# Patient Record
Sex: FEMALE | Race: BLACK OR AFRICAN AMERICAN | NOT HISPANIC OR LATINO | URBAN - METROPOLITAN AREA
[De-identification: names, ages, dates, MRNs, and addresses within clinical notes are randomized per-mention and may not be internally consistent; named-entity substitution may affect disease eponyms.]

---

## 2018-09-21 ENCOUNTER — INPATIENT (INPATIENT)
Facility: HOSPITAL | Age: 43
LOS: 4 days | Discharge: HOME | DRG: 292 | End: 2018-09-26
Attending: FAMILY MEDICINE | Admitting: FAMILY MEDICINE
Payer: MEDICAID

## 2018-09-21 VITALS — WEIGHT: 240.08 LBS | HEIGHT: 68 IN | HEART RATE: 115 BPM | RESPIRATION RATE: 18 BRPM | OXYGEN SATURATION: 100 %

## 2018-09-21 LAB
ALBUMIN SERPL ELPH-MCNC: 3.6 G/DL — SIGNIFICANT CHANGE UP (ref 3.5–5)
ALP SERPL-CCNC: 81 U/L — SIGNIFICANT CHANGE UP (ref 40–120)
ALT FLD-CCNC: 42 U/L DA — SIGNIFICANT CHANGE UP (ref 10–60)
ANION GAP SERPL CALC-SCNC: 10 MMOL/L — SIGNIFICANT CHANGE UP (ref 5–17)
AST SERPL-CCNC: 30 U/L — SIGNIFICANT CHANGE UP (ref 10–40)
BASOPHILS # BLD AUTO: 0.1 K/UL — SIGNIFICANT CHANGE UP (ref 0–0.2)
BASOPHILS NFR BLD AUTO: 1.2 % — SIGNIFICANT CHANGE UP (ref 0–2)
BILIRUB SERPL-MCNC: 1.4 MG/DL — HIGH (ref 0.2–1.2)
BUN SERPL-MCNC: 18 MG/DL — SIGNIFICANT CHANGE UP (ref 7–18)
CALCIUM SERPL-MCNC: 8.9 MG/DL — SIGNIFICANT CHANGE UP (ref 8.4–10.5)
CHLORIDE SERPL-SCNC: 106 MMOL/L — SIGNIFICANT CHANGE UP (ref 96–108)
CO2 SERPL-SCNC: 23 MMOL/L — SIGNIFICANT CHANGE UP (ref 22–31)
CREAT SERPL-MCNC: 1.23 MG/DL — SIGNIFICANT CHANGE UP (ref 0.5–1.3)
D DIMER BLD IA.RAPID-MCNC: 912 NG/ML DDU — HIGH
EOSINOPHIL # BLD AUTO: 0.1 K/UL — SIGNIFICANT CHANGE UP (ref 0–0.5)
EOSINOPHIL NFR BLD AUTO: 1.4 % — SIGNIFICANT CHANGE UP (ref 0–6)
GLUCOSE SERPL-MCNC: 137 MG/DL — HIGH (ref 70–99)
HCG SERPL-ACNC: <1 MIU/ML — SIGNIFICANT CHANGE UP
HCT VFR BLD CALC: 37.7 % — SIGNIFICANT CHANGE UP (ref 34.5–45)
HGB BLD-MCNC: 11.9 G/DL — SIGNIFICANT CHANGE UP (ref 11.5–15.5)
LYMPHOCYTES # BLD AUTO: 1.7 K/UL — SIGNIFICANT CHANGE UP (ref 1–3.3)
LYMPHOCYTES # BLD AUTO: 25.6 % — SIGNIFICANT CHANGE UP (ref 13–44)
MCHC RBC-ENTMCNC: 31.4 GM/DL — LOW (ref 32–36)
MCHC RBC-ENTMCNC: 32.7 PG — SIGNIFICANT CHANGE UP (ref 27–34)
MCV RBC AUTO: 103.9 FL — HIGH (ref 80–100)
MONOCYTES # BLD AUTO: 0.3 K/UL — SIGNIFICANT CHANGE UP (ref 0–0.9)
MONOCYTES NFR BLD AUTO: 4.5 % — SIGNIFICANT CHANGE UP (ref 2–14)
NEUTROPHILS # BLD AUTO: 4.6 K/UL — SIGNIFICANT CHANGE UP (ref 1.8–7.4)
NEUTROPHILS NFR BLD AUTO: 67.3 % — SIGNIFICANT CHANGE UP (ref 43–77)
PLATELET # BLD AUTO: 350 K/UL — SIGNIFICANT CHANGE UP (ref 150–400)
POTASSIUM SERPL-MCNC: 3.8 MMOL/L — SIGNIFICANT CHANGE UP (ref 3.5–5.3)
POTASSIUM SERPL-SCNC: 3.8 MMOL/L — SIGNIFICANT CHANGE UP (ref 3.5–5.3)
PROT SERPL-MCNC: 7.5 G/DL — SIGNIFICANT CHANGE UP (ref 6–8.3)
RBC # BLD: 3.63 M/UL — LOW (ref 3.8–5.2)
RBC # FLD: 14.9 % — HIGH (ref 10.3–14.5)
SODIUM SERPL-SCNC: 139 MMOL/L — SIGNIFICANT CHANGE UP (ref 135–145)
TROPONIN I SERPL-MCNC: 0.06 NG/ML — HIGH (ref 0–0.04)
WBC # BLD: 6.8 K/UL — SIGNIFICANT CHANGE UP (ref 3.8–10.5)
WBC # FLD AUTO: 6.8 K/UL — SIGNIFICANT CHANGE UP (ref 3.8–10.5)

## 2018-09-21 PROCEDURE — 71275 CT ANGIOGRAPHY CHEST: CPT | Mod: 26

## 2018-09-21 PROCEDURE — 71045 X-RAY EXAM CHEST 1 VIEW: CPT | Mod: 26

## 2018-09-21 RX ORDER — NITROGLYCERIN 6.5 MG
0.4 CAPSULE, EXTENDED RELEASE ORAL ONCE
Qty: 0 | Refills: 0 | Status: COMPLETED | OUTPATIENT
Start: 2018-09-21 | End: 2018-09-21

## 2018-09-21 RX ORDER — NITROGLYCERIN 6.5 MG
1 CAPSULE, EXTENDED RELEASE ORAL DAILY
Qty: 0 | Refills: 0 | Status: DISCONTINUED | OUTPATIENT
Start: 2018-09-21 | End: 2018-09-22

## 2018-09-21 RX ORDER — FUROSEMIDE 40 MG
80 TABLET ORAL ONCE
Qty: 0 | Refills: 0 | Status: COMPLETED | OUTPATIENT
Start: 2018-09-21 | End: 2018-09-21

## 2018-09-21 RX ADMIN — Medication 80 MILLIGRAM(S): at 23:19

## 2018-09-21 RX ADMIN — Medication 0.4 MILLIGRAM(S): at 21:56

## 2018-09-21 NOTE — ED PROVIDER NOTE - CARE PLAN
Principal Discharge DX:	Acute congestive heart failure, unspecified heart failure type  Secondary Diagnosis:	Hypertension, unspecified type

## 2018-09-21 NOTE — ED ADULT NURSE NOTE - NSIMPLEMENTINTERV_GEN_ALL_ED
Implemented All Universal Safety Interventions:  [place] to call system. Call bell, personal items and telephone within reach. Instruct patient to call for assistance. Room bathroom lighting operational. Non-slip footwear when patient is off stretcher. Physically safe environment: no spills, clutter or unnecessary equipment. Stretcher in lowest position, wheels locked, appropriate side rails in place.

## 2018-09-21 NOTE — ED PROVIDER NOTE - MEDICAL DECISION MAKING DETAILS
No known PE risk factors but given mild tachycardia and respiratory distress, will obtain dimer. Presentation concerning for new onset CHF. R/o ACS, may be secondary to right heart strain from PE. HCG, CDC, CMP, stroke, BNP, EKG, monitor, Nitroglycerin, CXR, likely CTA and admit. Pt feels greatly improved on 3liter nasal cannula with improvement in work of breathing.

## 2018-09-21 NOTE — ED ADULT NURSE NOTE - OBJECTIVE STATEMENT
pt is aox3 came to ER via EMS with c/o SOB. and high blood pressure. pt said she is not on any BP medication. EKG was done. To be seen by Md

## 2018-09-22 DIAGNOSIS — I50.9 HEART FAILURE, UNSPECIFIED: ICD-10-CM

## 2018-09-22 DIAGNOSIS — Z29.9 ENCOUNTER FOR PROPHYLACTIC MEASURES, UNSPECIFIED: ICD-10-CM

## 2018-09-22 DIAGNOSIS — I16.1 HYPERTENSIVE EMERGENCY: ICD-10-CM

## 2018-09-22 DIAGNOSIS — J45.909 UNSPECIFIED ASTHMA, UNCOMPLICATED: ICD-10-CM

## 2018-09-22 LAB
ANION GAP SERPL CALC-SCNC: 11 MMOL/L — SIGNIFICANT CHANGE UP (ref 5–17)
ANION GAP SERPL CALC-SCNC: 8 MMOL/L — SIGNIFICANT CHANGE UP (ref 5–17)
ANION GAP SERPL CALC-SCNC: 9 MMOL/L — SIGNIFICANT CHANGE UP (ref 5–17)
BASOPHILS # BLD AUTO: 0.1 K/UL — SIGNIFICANT CHANGE UP (ref 0–0.2)
BASOPHILS NFR BLD AUTO: 0.8 % — SIGNIFICANT CHANGE UP (ref 0–2)
BUN SERPL-MCNC: 14 MG/DL — SIGNIFICANT CHANGE UP (ref 7–18)
BUN SERPL-MCNC: 15 MG/DL — SIGNIFICANT CHANGE UP (ref 7–18)
BUN SERPL-MCNC: 19 MG/DL — HIGH (ref 7–18)
CALCIUM SERPL-MCNC: 8.8 MG/DL — SIGNIFICANT CHANGE UP (ref 8.4–10.5)
CALCIUM SERPL-MCNC: 8.9 MG/DL — SIGNIFICANT CHANGE UP (ref 8.4–10.5)
CALCIUM SERPL-MCNC: 9.3 MG/DL — SIGNIFICANT CHANGE UP (ref 8.4–10.5)
CHLORIDE SERPL-SCNC: 100 MMOL/L — SIGNIFICANT CHANGE UP (ref 96–108)
CHLORIDE SERPL-SCNC: 101 MMOL/L — SIGNIFICANT CHANGE UP (ref 96–108)
CHLORIDE SERPL-SCNC: 102 MMOL/L — SIGNIFICANT CHANGE UP (ref 96–108)
CHOLEST SERPL-MCNC: 214 MG/DL — HIGH (ref 10–199)
CK MB CFR SERPL CALC: <1 NG/ML — SIGNIFICANT CHANGE UP (ref 0–3.6)
CK SERPL-CCNC: 251 U/L — HIGH (ref 21–215)
CO2 SERPL-SCNC: 27 MMOL/L — SIGNIFICANT CHANGE UP (ref 22–31)
CO2 SERPL-SCNC: 27 MMOL/L — SIGNIFICANT CHANGE UP (ref 22–31)
CO2 SERPL-SCNC: 28 MMOL/L — SIGNIFICANT CHANGE UP (ref 22–31)
CREAT SERPL-MCNC: 1.08 MG/DL — SIGNIFICANT CHANGE UP (ref 0.5–1.3)
CREAT SERPL-MCNC: 1.09 MG/DL — SIGNIFICANT CHANGE UP (ref 0.5–1.3)
CREAT SERPL-MCNC: 1.17 MG/DL — SIGNIFICANT CHANGE UP (ref 0.5–1.3)
EOSINOPHIL # BLD AUTO: 0 K/UL — SIGNIFICANT CHANGE UP (ref 0–0.5)
EOSINOPHIL NFR BLD AUTO: 0.1 % — SIGNIFICANT CHANGE UP (ref 0–6)
GLUCOSE SERPL-MCNC: 114 MG/DL — HIGH (ref 70–99)
GLUCOSE SERPL-MCNC: 116 MG/DL — HIGH (ref 70–99)
GLUCOSE SERPL-MCNC: 90 MG/DL — SIGNIFICANT CHANGE UP (ref 70–99)
HBA1C BLD-MCNC: 4.9 % — SIGNIFICANT CHANGE UP (ref 4–5.6)
HCT VFR BLD CALC: 40.4 % — SIGNIFICANT CHANGE UP (ref 34.5–45)
HDLC SERPL-MCNC: 47 MG/DL — LOW
HGB BLD-MCNC: 13 G/DL — SIGNIFICANT CHANGE UP (ref 11.5–15.5)
INR BLD: 1.22 RATIO — HIGH (ref 0.88–1.16)
LIPID PNL WITH DIRECT LDL SERPL: 146 MG/DL — SIGNIFICANT CHANGE UP
LYMPHOCYTES # BLD AUTO: 1.2 K/UL — SIGNIFICANT CHANGE UP (ref 1–3.3)
LYMPHOCYTES # BLD AUTO: 10.5 % — LOW (ref 13–44)
MCHC RBC-ENTMCNC: 32.3 GM/DL — SIGNIFICANT CHANGE UP (ref 32–36)
MCHC RBC-ENTMCNC: 33.6 PG — SIGNIFICANT CHANGE UP (ref 27–34)
MCV RBC AUTO: 104 FL — HIGH (ref 80–100)
MONOCYTES # BLD AUTO: 0.4 K/UL — SIGNIFICANT CHANGE UP (ref 0–0.9)
MONOCYTES NFR BLD AUTO: 3.6 % — SIGNIFICANT CHANGE UP (ref 2–14)
NEUTROPHILS # BLD AUTO: 9.5 K/UL — HIGH (ref 1.8–7.4)
NEUTROPHILS NFR BLD AUTO: 85 % — HIGH (ref 43–77)
PLATELET # BLD AUTO: 347 K/UL — SIGNIFICANT CHANGE UP (ref 150–400)
POTASSIUM SERPL-MCNC: 2.9 MMOL/L — CRITICAL LOW (ref 3.5–5.3)
POTASSIUM SERPL-MCNC: 3.1 MMOL/L — LOW (ref 3.5–5.3)
POTASSIUM SERPL-MCNC: 6.3 MMOL/L — CRITICAL HIGH (ref 3.5–5.3)
POTASSIUM SERPL-SCNC: 2.9 MMOL/L — CRITICAL LOW (ref 3.5–5.3)
POTASSIUM SERPL-SCNC: 3.1 MMOL/L — LOW (ref 3.5–5.3)
POTASSIUM SERPL-SCNC: 6.3 MMOL/L — CRITICAL HIGH (ref 3.5–5.3)
PROTHROM AB SERPL-ACNC: 13.3 SEC — HIGH (ref 9.8–12.7)
RBC # BLD: 3.88 M/UL — SIGNIFICANT CHANGE UP (ref 3.8–5.2)
RBC # FLD: 14.8 % — HIGH (ref 10.3–14.5)
SODIUM SERPL-SCNC: 136 MMOL/L — SIGNIFICANT CHANGE UP (ref 135–145)
SODIUM SERPL-SCNC: 138 MMOL/L — SIGNIFICANT CHANGE UP (ref 135–145)
SODIUM SERPL-SCNC: 139 MMOL/L — SIGNIFICANT CHANGE UP (ref 135–145)
TOTAL CHOLESTEROL/HDL RATIO MEASUREMENT: 4.6 RATIO — SIGNIFICANT CHANGE UP (ref 3.3–7.1)
TRIGL SERPL-MCNC: 104 MG/DL — SIGNIFICANT CHANGE UP (ref 10–149)
TROPONIN I SERPL-MCNC: 0.03 NG/ML — SIGNIFICANT CHANGE UP (ref 0–0.04)
TROPONIN I SERPL-MCNC: 0.05 NG/ML — HIGH (ref 0–0.04)
TSH SERPL-MCNC: 2.28 UU/ML — SIGNIFICANT CHANGE UP (ref 0.34–4.82)
WBC # BLD: 11.1 K/UL — HIGH (ref 3.8–10.5)
WBC # FLD AUTO: 11.1 K/UL — HIGH (ref 3.8–10.5)

## 2018-09-22 PROCEDURE — 99285 EMERGENCY DEPT VISIT HI MDM: CPT

## 2018-09-22 RX ORDER — NITROGLYCERIN 6.5 MG
5 CAPSULE, EXTENDED RELEASE ORAL
Qty: 50 | Refills: 0 | Status: DISCONTINUED | OUTPATIENT
Start: 2018-09-22 | End: 2018-09-22

## 2018-09-22 RX ORDER — NITROGLYCERIN 6.5 MG
1 CAPSULE, EXTENDED RELEASE ORAL ONCE
Qty: 0 | Refills: 0 | Status: COMPLETED | OUTPATIENT
Start: 2018-09-22 | End: 2018-09-22

## 2018-09-22 RX ORDER — ASPIRIN/CALCIUM CARB/MAGNESIUM 324 MG
325 TABLET ORAL ONCE
Qty: 0 | Refills: 0 | Status: COMPLETED | OUTPATIENT
Start: 2018-09-22 | End: 2018-09-22

## 2018-09-22 RX ORDER — POTASSIUM CHLORIDE 20 MEQ
20 PACKET (EA) ORAL
Qty: 0 | Refills: 0 | Status: COMPLETED | OUTPATIENT
Start: 2018-09-22 | End: 2018-09-22

## 2018-09-22 RX ORDER — ASPIRIN/CALCIUM CARB/MAGNESIUM 324 MG
81 TABLET ORAL DAILY
Qty: 0 | Refills: 0 | Status: DISCONTINUED | OUTPATIENT
Start: 2018-09-22 | End: 2018-09-26

## 2018-09-22 RX ORDER — METOPROLOL TARTRATE 50 MG
25 TABLET ORAL
Qty: 0 | Refills: 0 | Status: DISCONTINUED | OUTPATIENT
Start: 2018-09-22 | End: 2018-09-26

## 2018-09-22 RX ORDER — POTASSIUM CHLORIDE 20 MEQ
20 PACKET (EA) ORAL
Qty: 0 | Refills: 0 | Status: COMPLETED | OUTPATIENT
Start: 2018-09-22 | End: 2018-09-23

## 2018-09-22 RX ORDER — NITROGLYCERIN 6.5 MG
0.5 CAPSULE, EXTENDED RELEASE ORAL
Qty: 50 | Refills: 0 | Status: DISCONTINUED | OUTPATIENT
Start: 2018-09-22 | End: 2018-09-22

## 2018-09-22 RX ORDER — HYDRALAZINE HCL 50 MG
10 TABLET ORAL EVERY 6 HOURS
Qty: 0 | Refills: 0 | Status: DISCONTINUED | OUTPATIENT
Start: 2018-09-22 | End: 2018-09-24

## 2018-09-22 RX ORDER — HYDRALAZINE HCL 50 MG
10 TABLET ORAL EVERY 4 HOURS
Qty: 0 | Refills: 0 | Status: DISCONTINUED | OUTPATIENT
Start: 2018-09-22 | End: 2018-09-22

## 2018-09-22 RX ORDER — FUROSEMIDE 40 MG
60 TABLET ORAL EVERY 12 HOURS
Qty: 0 | Refills: 0 | Status: DISCONTINUED | OUTPATIENT
Start: 2018-09-22 | End: 2018-09-24

## 2018-09-22 RX ORDER — ATORVASTATIN CALCIUM 80 MG/1
40 TABLET, FILM COATED ORAL AT BEDTIME
Qty: 0 | Refills: 0 | Status: DISCONTINUED | OUTPATIENT
Start: 2018-09-22 | End: 2018-09-26

## 2018-09-22 RX ORDER — HEPARIN SODIUM 5000 [USP'U]/ML
INJECTION INTRAVENOUS; SUBCUTANEOUS
Qty: 25000 | Refills: 0 | Status: DISCONTINUED | OUTPATIENT
Start: 2018-09-22 | End: 2018-09-22

## 2018-09-22 RX ORDER — HYDRALAZINE HCL 50 MG
10 TABLET ORAL ONCE
Qty: 0 | Refills: 0 | Status: COMPLETED | OUTPATIENT
Start: 2018-09-22 | End: 2018-09-22

## 2018-09-22 RX ORDER — HYDRALAZINE HCL 50 MG
10 TABLET ORAL EVERY 6 HOURS
Qty: 0 | Refills: 0 | Status: DISCONTINUED | OUTPATIENT
Start: 2018-09-22 | End: 2018-09-22

## 2018-09-22 RX ORDER — LISINOPRIL 2.5 MG/1
5 TABLET ORAL DAILY
Qty: 0 | Refills: 0 | Status: DISCONTINUED | OUTPATIENT
Start: 2018-09-22 | End: 2018-09-24

## 2018-09-22 RX ORDER — HYDRALAZINE HCL 50 MG
10 TABLET ORAL THREE TIMES A DAY
Qty: 0 | Refills: 0 | Status: DISCONTINUED | OUTPATIENT
Start: 2018-09-22 | End: 2018-09-22

## 2018-09-22 RX ORDER — ALBUTEROL 90 UG/1
2 AEROSOL, METERED ORAL EVERY 6 HOURS
Qty: 0 | Refills: 0 | Status: DISCONTINUED | OUTPATIENT
Start: 2018-09-22 | End: 2018-09-26

## 2018-09-22 RX ADMIN — Medication 1 INCH(S): at 12:10

## 2018-09-22 RX ADMIN — Medication 20 MILLIEQUIVALENT(S): at 11:07

## 2018-09-22 RX ADMIN — Medication 1 INCH(S): at 01:07

## 2018-09-22 RX ADMIN — ATORVASTATIN CALCIUM 40 MILLIGRAM(S): 80 TABLET, FILM COATED ORAL at 22:32

## 2018-09-22 RX ADMIN — Medication 60 MILLIGRAM(S): at 05:43

## 2018-09-22 RX ADMIN — HEPARIN SODIUM 1000 UNIT(S)/HR: 5000 INJECTION INTRAVENOUS; SUBCUTANEOUS at 01:41

## 2018-09-22 RX ADMIN — Medication 60 MILLIGRAM(S): at 19:00

## 2018-09-22 RX ADMIN — Medication 10 MILLIGRAM(S): at 01:43

## 2018-09-22 RX ADMIN — Medication 325 MILLIGRAM(S): at 01:43

## 2018-09-22 RX ADMIN — Medication 20 MILLIEQUIVALENT(S): at 14:07

## 2018-09-22 RX ADMIN — ALBUTEROL 2 PUFF(S): 90 AEROSOL, METERED ORAL at 14:07

## 2018-09-22 RX ADMIN — Medication 25 MILLIGRAM(S): at 19:00

## 2018-09-22 RX ADMIN — LISINOPRIL 5 MILLIGRAM(S): 2.5 TABLET ORAL at 11:07

## 2018-09-22 RX ADMIN — Medication 20 MILLIEQUIVALENT(S): at 12:05

## 2018-09-22 RX ADMIN — Medication 20 MILLIEQUIVALENT(S): at 19:46

## 2018-09-22 RX ADMIN — Medication 81 MILLIGRAM(S): at 19:00

## 2018-09-22 RX ADMIN — Medication 20 MILLIEQUIVALENT(S): at 22:32

## 2018-09-22 NOTE — H&P ADULT - ASSESSMENT
HPI: 44 y/o Female pt with a PMHx of Asthma on Albuterol inhaler and HTN( Non complaint with her medications) H and no significant PSHx presents to ED c/o several days of gradual onset, gradually worsening SOB, GUILLERMO, and orthopnea.  Patient admits that she did not follow a diet after she was diagnosed with elevated blood pressure and does not have regular follow up with a PMD. Patient denies recent travel, trauma, surgery, hypercoagulation in self or family, cancer Hx, lower extremity edema, hemoptysis, Chest Pain, cough, exertional CP, or any other complaints. Allergies: Penicillin    Patient states that she went to [facility] where they found her Blood pressure was elevated around the 200 so they sent her to [facility]. Her initial blood pressure upon arrival 213/141 with a heart rate of 122. ED work up was done   First set of troponin 0.059 with an elevated BNP: 3404. CT angio was done to R/O PE. CT showed: No pulmonary embolism in the main pulmonary arteries, lobar branches, and  segmental branches. but showed: Cardiomegaly with evidence of right heart failure. Alveolar and  interstitial lung edema and small bilateral pleural effusions (right greater than left). Dilated main pulmonary artery which may be reflective of underlying pulmonary arterial hypertension.    In the ED patient was given Lasix 80mg IV x dose and Nitroglycerin. Repeated Blood pressure was done and repeat showed 210/145 with elevated HR of 119.     Sign out was given to by the ED to medical team      Patient evaluated at bedside. Looking SOB. Physical exam significant with Bibasilar crackles. VS Significant for  /150 and . EKG showing no ST segment elevation with Non specific T wave inversion V2-V3 and T wave flattening in V4-V5      Patient will be admitted to the floor for new onset CHF with Right heart Failure and NSTEMI

## 2018-09-22 NOTE — H&P ADULT - PROBLEM SELECTOR PLAN 4
IMPROVE VTE score: 3  Will manage with: Lovenox S/C Heparin S/C    [ ] Previous VTE                                    3  [ ] Thrombophilia                                  2  [ x] Lower limb paralysis                        2  (unable to hold up >15 seconds)    [ ] Current Cancer (within 6 months)        2   [x] Immobilization > 24 hrs                    1  [ ] ICU/CCU stay > 24 hrs                      1  [x] Age > 60                                         1

## 2018-09-22 NOTE — H&P ADULT - NSHPPHYSICALEXAM_GEN_ALL_CORE
General - NAD, sitting up in bed  Eyes - PERRLA, EOM intact  ENT - Nonicteric sclerae, PERRLA, EOMI. Oropharynx clear. Moist mucous membranes. Conjunctivae appear well perfused.   Neck - No noticeable or palpable swelling, redness or rash around throat or on face  Lymph Nodes - No lymphadenopathy  Cardiovascular -   Lungs - Accessory muscle use  Abdomen - Normal bowel sounds, abdomen soft and nontender  Extremities - No edema, cyanosis or clubbing  Musculo Skeletal - 5/5 strength, normal range of motion, no swollen or erythematous  joints.  Neurological – Alert and oriented x 3, General - NAD, sitting up in bed, Obese  Eyes - PERRLA, EOM intact  ENT - Nonicteric sclerae, PERRLA, EOMI. Oropharynx clear. Moist mucous membranes. Conjunctivae appear well perfused.   Neck - No noticeable or palpable swelling, redness or rash around throat or on face  Lymph Nodes - No lymphadenopathy  Cardiovascular - No JVD, Tachycardic. No appreciable murmurs   Lungs - Accessory muscle use. Bilateral bibasilar crackles  Abdomen - Normal bowel sounds, abdomen soft and nontender  Extremities - No edema, cyanosis or clubbing  Musculo Skeletal - 5/5 strength, normal range of motion, no swollen or erythematous  joints.  Neurological – Alert and oriented x 3,

## 2018-09-22 NOTE — ED ADULT NURSE REASSESSMENT NOTE - NS ED NURSE REASSESS COMMENT FT1
pt with uncontrollable b/p does not have a Doctor and is not on any medication. started on Heparin Drip for R/O ACS drip is @ 1000units hr troponin was elevated D-Dimer was elevated. Was seen by Resident

## 2018-09-22 NOTE — H&P ADULT - PROBLEM SELECTOR PLAN 2
Patient with history of poor blood pressure control  Given 10 IV lasix with minimal improvement will recheck and start on Nitr Patient with history of poor blood pressure control  Given 10 IV lasix with minimal improvement  Managed with IV hydralazine 10 mg iv Q 6HRS Patient with history of poor blood pressure control  Managed with IV hydralazine 10 mg iv Q 6HRS with good response

## 2018-09-22 NOTE — H&P ADULT - PROBLEM SELECTOR PLAN 1
New onset Right Heart Failure  Causes include uncontrolled BP and NSTEMI  Trending Troponin T1 0.53  ED gave 80mg IV lasix  Management:  - Asa 325 mg given, started on Heparin drip while trending troponin  - ASA + Lipitor  - Patient diuresing well will continue IV Lasix 60mg IV BID  - Check Echocardiogram  - Stricts I/O and daily Weights  - Cardiology Dr Jensen New onset Right Heart Failure  Causes include uncontrolled BP and NSTEMI  Trending Troponin T1 0.53 Check T2 T3  ED gave 80mg IV lasix  Management:  - Asa 325 mg given, started on Heparin drip while trending troponin  - ASA + Lipitor  - Patient diuresing well will continue IV Lasix 60mg IV BID  - Check Echocardiogram  - Stricts I/O and daily Weights  - Cardiology Dr Jensen New onset Right Heart Failure  Causes include uncontrolled BP vs NSTEMI  Trending Troponin T1 0.53 Check T2 T3  ED gave 80mg IV lasix  Management:  - Asa 325 mg given, started on Heparin drip while trending troponin  - ASA + Lipitor  - Patient diuresing well will continue IV Lasix 60mg IV BID  - Check Echocardiogram  - Stricts I/O and daily Weights  - Cardiology Dr Jensen

## 2018-09-22 NOTE — H&P ADULT - HISTORY OF PRESENT ILLNESS
44 y/o Female pt with a PMHx of Asthma on Albuterol inhaler and HTN( Non complaint with her medications) H and no significant PSHx presents to ED c/o several days of gradual onset, gradually worsening SOB, GUILLERMO, and orthopnea.  Patient admits that she did not follow a diet after she was diagnosed with elevated blood pressure and does not have regular follow up with a PMD. Patient denies recent travel, trauma, surgery, hypercoagulation in self or family, cancer Hx, lower extremity edema, hemoptysis, Chest Pain, cough, exertional CP, or any other complaints. Allergies: Penicillin    Patient states that she went to [facility] where they found her Blood pressure was elevated around the 200 so they sent her to [facility]. Her initial blood pressure upon arrival 213/141 with a heart rate of 122. ED work up was done   First set of troponin 0.059 with an elevated BNP: 3404. CT angio was done to R/O PE. CT showed: No pulmonary embolism in the main pulmonary arteries, lobar branches, and  segmental branches. but showed: Cardiomegaly with evidence of right heart failure. Alveolar and  interstitial lung edema and small bilateral pleural effusions (right greater than left). Dilated main pulmonary artery which may be reflective of underlying pulmonary arterial hypertension.    In the ED patient was given Lasix 80mg IV x dose and Nitroglycerin. Repeated Blood pressure was done and repeat showed 210/145 with elevated HR of 119.     Sign out was given to by the ED to medical team

## 2018-09-22 NOTE — H&P ADULT - ATTENDING COMMENTS
Patient seen and examined. Case fully discussed with  ER attending and medical resident. Reviewed chief complaint. Reviewed review of systems. Reviewed list of medications.  Reviewed physical exam.  Reviewed assessment and plan. agree with full H and P. Will follow up clinically. Will follow up with cardiology.

## 2018-09-23 ENCOUNTER — TRANSCRIPTION ENCOUNTER (OUTPATIENT)
Age: 43
End: 2018-09-23

## 2018-09-23 LAB
ANION GAP SERPL CALC-SCNC: 10 MMOL/L — SIGNIFICANT CHANGE UP (ref 5–17)
BASOPHILS # BLD AUTO: 0.1 K/UL — SIGNIFICANT CHANGE UP (ref 0–0.2)
BASOPHILS NFR BLD AUTO: 1.3 % — SIGNIFICANT CHANGE UP (ref 0–2)
BUN SERPL-MCNC: 17 MG/DL — SIGNIFICANT CHANGE UP (ref 7–18)
CALCIUM SERPL-MCNC: 8.9 MG/DL — SIGNIFICANT CHANGE UP (ref 8.4–10.5)
CHLORIDE SERPL-SCNC: 102 MMOL/L — SIGNIFICANT CHANGE UP (ref 96–108)
CO2 SERPL-SCNC: 25 MMOL/L — SIGNIFICANT CHANGE UP (ref 22–31)
CREAT SERPL-MCNC: 1.29 MG/DL — SIGNIFICANT CHANGE UP (ref 0.5–1.3)
EOSINOPHIL # BLD AUTO: 0.2 K/UL — SIGNIFICANT CHANGE UP (ref 0–0.5)
EOSINOPHIL NFR BLD AUTO: 2.9 % — SIGNIFICANT CHANGE UP (ref 0–6)
GLUCOSE SERPL-MCNC: 119 MG/DL — HIGH (ref 70–99)
HCT VFR BLD CALC: 43.4 % — SIGNIFICANT CHANGE UP (ref 34.5–45)
HGB BLD-MCNC: 13.6 G/DL — SIGNIFICANT CHANGE UP (ref 11.5–15.5)
LYMPHOCYTES # BLD AUTO: 1.6 K/UL — SIGNIFICANT CHANGE UP (ref 1–3.3)
LYMPHOCYTES # BLD AUTO: 22.6 % — SIGNIFICANT CHANGE UP (ref 13–44)
MCHC RBC-ENTMCNC: 31.4 GM/DL — LOW (ref 32–36)
MCHC RBC-ENTMCNC: 33.5 PG — SIGNIFICANT CHANGE UP (ref 27–34)
MCV RBC AUTO: 106.8 FL — HIGH (ref 80–100)
MONOCYTES # BLD AUTO: 0.5 K/UL — SIGNIFICANT CHANGE UP (ref 0–0.9)
MONOCYTES NFR BLD AUTO: 7.4 % — SIGNIFICANT CHANGE UP (ref 2–14)
NEUTROPHILS # BLD AUTO: 4.7 K/UL — SIGNIFICANT CHANGE UP (ref 1.8–7.4)
NEUTROPHILS NFR BLD AUTO: 65.7 % — SIGNIFICANT CHANGE UP (ref 43–77)
PLATELET # BLD AUTO: 402 K/UL — HIGH (ref 150–400)
POTASSIUM SERPL-MCNC: 3.7 MMOL/L — SIGNIFICANT CHANGE UP (ref 3.5–5.3)
POTASSIUM SERPL-SCNC: 3.7 MMOL/L — SIGNIFICANT CHANGE UP (ref 3.5–5.3)
RBC # BLD: 4.06 M/UL — SIGNIFICANT CHANGE UP (ref 3.8–5.2)
RBC # FLD: 15 % — HIGH (ref 10.3–14.5)
SODIUM SERPL-SCNC: 137 MMOL/L — SIGNIFICANT CHANGE UP (ref 135–145)
WBC # BLD: 7.2 K/UL — SIGNIFICANT CHANGE UP (ref 3.8–10.5)
WBC # FLD AUTO: 7.2 K/UL — SIGNIFICANT CHANGE UP (ref 3.8–10.5)

## 2018-09-23 RX ADMIN — Medication 25 MILLIGRAM(S): at 17:29

## 2018-09-23 RX ADMIN — LISINOPRIL 5 MILLIGRAM(S): 2.5 TABLET ORAL at 06:27

## 2018-09-23 RX ADMIN — Medication 60 MILLIGRAM(S): at 06:27

## 2018-09-23 RX ADMIN — Medication 60 MILLIGRAM(S): at 17:29

## 2018-09-23 RX ADMIN — Medication 25 MILLIGRAM(S): at 06:27

## 2018-09-23 RX ADMIN — ATORVASTATIN CALCIUM 40 MILLIGRAM(S): 80 TABLET, FILM COATED ORAL at 21:28

## 2018-09-23 RX ADMIN — Medication 81 MILLIGRAM(S): at 11:56

## 2018-09-23 RX ADMIN — Medication 20 MILLIEQUIVALENT(S): at 00:16

## 2018-09-23 NOTE — DISCHARGE NOTE ADULT - PATIENT PORTAL LINK FT
You can access the [facility], offered by [facility], by registering with the following website: [email or web address]

## 2018-09-23 NOTE — DISCHARGE NOTE ADULT - CARE PROVIDER_API CALL
Berry Morocho (DO), Medicine  [address] [place] [address]  [address]  [place] [address]  Phone: [phone]  Fax: [phone]

## 2018-09-23 NOTE — PROGRESS NOTE ADULT - SUBJECTIVE AND OBJECTIVE BOX
PGY1 Note discussed with Supervising Resident and Primary Attending.    Patient is a 43y old  Female who presents with a chief complaint of SOB (22 Sep 2018 01:21)      INTERVAL HPI/OVERNIGHT EVENTS :    MEDICATIONS  (STANDING):  aspirin  chewable 81 milliGRAM(s) Oral daily  atorvastatin 40 milliGRAM(s) Oral at bedtime  furosemide   Injectable 60 milliGRAM(s) IV Push every 12 hours  lisinopril 5 milliGRAM(s) Oral daily  metoprolol tartrate 25 milliGRAM(s) Oral two times a day    MEDICATIONS  (PRN):  ALBUTerol    90 MICROgram(s) HFA Inhaler 2 Puff(s) Inhalation every 6 hours PRN Shortness of Breath and/or Wheezing  hydrALAZINE Injectable 10 milliGRAM(s) IV Push every 6 hours PRN FOR SBP > 180      Allergies    penicillin (Other)    Intolerances        REVIEW OF SYSTEMS :  * CONSTITUTIONAL      : No Fever, Weight loss, or Fatigue  * EYES                             : No eye pain , Visual disturbances or Discharge  * RESPIRATORY             : No Cough, Wheezing, Chills or Hemoptysis; No shortness of breath  * CARDIOVASCULAR     : No Chest pain, Palpitations, Dizziness, or Leg swelling  * GASTROINTESTINAL  : No Abdominal or Epigastric pain. No Nausea, Vomiting or Hematemesis; No Diarrhea or Constipation. No Melena or Hematochezia.  * GENITOURINARY        : No Dysuria , Frequency , Haematuria   * NEUROLOGICAL          : No Headaches, Memory loss, Loss of trength, Numbness, or Tremors  * MUSCULOSKELETAL   : No Joint pain  * PSYCHIATRY                 : No Depression or Anxiety   * HEME/LYMPH              : No Easy Bruising or Bleeding gums  * SKIN                               : No Itching, Burning, Rashes, or Lesions     Vital Signs Last 24 Hrs  T(C): 36.8 (23 Sep 2018 05:31), Max: 37.2 (23 Sep 2018 00:05)  T(F): 98.2 (23 Sep 2018 05:31), Max: 98.9 (23 Sep 2018 00:05)  HR: 97 (23 Sep 2018 05:31) (97 - 110)  BP: 150/99 (23 Sep 2018 05:31) (128/89 - 209/102)  BP(mean): --  RR: 16 (23 Sep 2018 05:31) (16 - 18)  SpO2: 99% (23 Sep 2018 05:31) (95% - 100%)    PHYSICAL EXAM :  * GENERAL                 : NAD, Well-groomed, Well-developed  * HEAD                       :  Atraumatic, Normocephalic  * EYES                         : EOMI, PERRLA, Conjunctiva and Sclera clear  * ENT                           : Moist Mucous Membranes  * NECK                         : Supple, No JVD, Normal Thyroid  * CHEST/LUNG           : Clear to Auscultation bilaterally; No Rales, Rhonchi, Wheezing or Rubs  * HEART                       : Regular Rate and Rhythm; No murmurs, Rubs or gallops  * ABDOMEN                : Soft, Non-tender, Non-distended; Bowel Sounds present  * NERVOUS SYSTEM  :  Alert & Oriented X3, Good Concentration; Motor Strength 5/5 B/L UL LL ; DTRs 2+ Intact and Symmetric  * EXTREMITIES            :  2+ Peripheral Pulses, No clubbing, cyanosis, or edema  * SKIN                           : No Rashes or Lesions    LABS:                          13.0   11.1  )-----------( 347      ( 22 Sep 2018 03:36 )             40.4     09-22    139  |  102  |  14  ----------------------------<  90  3.1<L>   |  28  |  1.08    Ca    8.8      22 Sep 2018 17:11    TPro  7.5  /  Alb  3.6  /  TBili  1.4<H>  /  DBili  x   /  AST  30  /  ALT  42  /  AlkPhos  81  09-21    PT/INR - ( 21 Sep 2018 21:25 )   PT: 13.3 sec;   INR: 1.22 ratio             CAPILLARY BLOOD GLUCOSE          RADIOLOGY & ADDITIONAL TESTS:   No radiological imaging was required    Imaging Personally Reviewed   :  [ ] YES  [ ] NO    Consultant(s) Notes Reviewed :  [ ] YES  [ ] NO PGY1 Note discussed with Supervising Resident and Primary Attending.    Patient is a 43y old  Female who presents with a chief complaint of SOB (22 Sep 2018 01:21)      INTERVAL HPI/OVERNIGHT EVENTS : No acute events reported overnight.    * EYES                             : No eye pain , Visual disturbances or   Given patient's improved clinical status and current hemodynamic stability, decision was made to discharge. Discussed with attending  Please refer to patient's complete medical chart with documents for a full hospital course, for this is only a brief summary.ess, reports feeling well but has mild SOB . Patient denies nausea, vomiting, diarrhea, chest pain , headache, dizziness.     MEDICATIONS  (STANDING):  aspirin  chewable 81 milliGRAM(s) Oral daily  atorvastatin 40 milliGRAM(s) Oral at bedtime  furosemide   Injectable 60 milliGRAM(s) IV Push every 12 hours  lisinopril 5 milliGRAM(s) Oral daily  metoprolol tartrate 25 milliGRAM(s) Oral two times a day    MEDICATIONS  (PRN):  ALBUTerol    90 MICROgram(s) HFA Inhaler 2 Puff(s) Inhalation every 6 hours PRN Shortness of Breath and/or Wheezing  hydrALAZINE Injectable 10 milliGRAM(s) IV Push every 6 hours PRN FOR SBP > 180      Allergies    penicillin (Other)    Intolerances        REVIEW OF SYSTEMS :  * CONSTITUTIONAL      : No Fever, Weight loss, or Fatigue  * EYES                             : No eye pain , Visual disturbances or Discharge  * RESPIRATORY             : , SOB . No Cough, Wheezing, Chills or Hemoptysis;  * CARDIOVASCULAR     : No Chest pain, Palpitations, Dizziness, or Leg swelling  * GASTROINTESTINAL  : No Abdominal or Epigastric pain. No Nausea, Vomiting or Hematemesis; No Diarrhea or Constipation. No Melena or Hematochezia.  * GENITOURINARY        : No Dysuria , Frequency , Haematuria   * NEUROLOGICAL          : No Headaches, Memory loss, Loss of trength, Numbness, or Tremors  * MUSCULOSKELETAL   : No Joint pain  * PSYCHIATRY                 : No Depression or Anxiety   * HEME/LYMPH              : No Easy Bruising or Bleeding gums  * SKIN                               : No Itching, Burning, Rashes, or Lesions     Vital Signs Last 24 Hrs  T(C): 36.8 (23 Sep 2018 05:31), Max: 37.2 (23 Sep 2018 00:05)  T(F): 98.2 (23 Sep 2018 05:31), Max: 98.9 (23 Sep 2018 00:05)  HR: 97 (23 Sep 2018 05:31) (97 - 110)  BP: 150/99 (23 Sep 2018 05:31) (128/89 - 209/102)  BP(mean): --  RR: 16 (23 Sep 2018 05:31) (16 - 18)  SpO2: 99% (23 Sep 2018 05:31) (95% - 100%)    PHYSICAL EXAM :  * GENERAL                 : NAD, Well-groomed, Well-developed  * HEAD                       :  Atraumatic, Normocephalic  * EYES                         : EOMI, PERRLA, Conjunctiva and Sclera clear  * ENT                           : Moist Mucous Membranes  * NECK                         : Supple, No JVD, Normal Thyroid  * CHEST/LUNG           : Clear to Auscultation bilaterally; No Rales, Rhonchi, Wheezing or Rubs  * HEART                       : Regular Rate and Rhythm; No murmurs, Rubs or gallops  * ABDOMEN                : Soft, Non-tender, Non-distended; Bowel Sounds present  * NERVOUS SYSTEM  :  Alert & Oriented X3, Good Concentration; Motor Strength 5/5 B/L UL LL ; DTRs 2+ Intact and Symmetric  * EXTREMITIES            :  2+ Peripheral Pulses, No clubbing, cyanosis, or edema  * SKIN                           : No Rashes or Lesions    LABS:                          13.0   11.1  )-----------( 347      ( 22 Sep 2018 03:36 )             40.4     09-22    139  |  102  |  14  ----------------------------<  90  3.1<L>   |  28  |  1.08    Ca    8.8      22 Sep 2018 17:11    TPro  7.5  /  Alb  3.6  /  TBili  1.4<H>  /  DBili  x   /  AST  30  /  ALT  42  /  AlkPhos  81  09-21    PT/INR - ( 21 Sep 2018 21:25 )   PT: 13.3 sec;   INR: 1.22 ratio             CAPILLARY BLOOD GLUCOSE          RADIOLOGY & ADDITIONAL TESTS:     Patient name: SANTY BREAUX  YOB: 1975   Age: 43 (F)   MR#: [id number]  Study Date: 9/22/2018  Location: [address] [place], [place] [address]onographer: Kylie Fletcher NIKOLE  Study quality: Technically good  Referring Physician:  NATALEE JONES MD  Blood Pressure: 170/98 mmHg  Height: 172 cm  Weight: 109 kg  BSA: 2.2 m2  ------------------------------------------------------------------------    PROCEDURE: Transthoracic echocardiogram with 2-D, M-Mode  and complete spectral and color flow Doppler.  INDICATION: Dyspnea, unspecified (R06.00)  HISTORY:  ------------------------------------------------------------------------  DIMENSIONS:  Dimensions:     Normal Values:  LA:     4.5 cm    2.0 - 4.0 cm  Ao:     3.7 cm    2.0 - 3.8 cm  SEPTUM: 1.1 cm    0.6 - 1.2 cm  PWT:    0.9 cm    0.6 - 1.1 cm  LVIDd:  6.0 cm    3.0 - 5.6 cm  LVIDs:  4.9 cm    1.8 - 4.0 cm      Derived Variables:  LVMI: 112 g/m2  RWT: 0.30  Ejection Fraction Visual Estimate: 40 %    ------------------------------------------------------------------------  OBSERVATIONS:  Mitral Valve: Normal mitral valve. Moderate to severe  mitral regurgitation.  Aortic Root: Aortic Root: 3.7 cm.    Aortic Valve: Normal trileaflet aortic valve.  Left Atrium: Moderate left atrial enlargement.  Left Ventricle: Mild global left ventricular systolic  dysfunction.40-45% Moderate left ventricular enlargement.  Grade IV diastolic dysfunction.  Right Heart: Normal right atrium. Normal right ventricular  size and function. Normal tricuspid valve. Normal pulmonic  valve.  Pericardium/PleuraNormal pericardium with no pericardial  effusion.  ------------------------------------------------------------------------  CONCLUSIONS:  1. Moderate to severe mitral regurgitation.  2. Moderate left atrial enlargement.  3. Moderate left ventricular enlargement.  4. Mild global left ventricular systolic dysfunction.40-45%  5. Grade IV diastolic dysfunction.      Imaging Personally Reviewed   :  [X] YES  [ ] NO    Consultant(s) Notes Reviewed :  [ ] YES  [ ] NO PGY1 Note discussed with Supervising Resident and Primary Attending.    Patient is a 43y old  Female who presents with a chief complaint of SOB (22 Sep 2018 01:21)      INTERVAL HPI/OVERNIGHT EVENTS : No acute events reported overnight.        MEDICATIONS  (STANDING):  aspirin  chewable 81 milliGRAM(s) Oral daily  atorvastatin 40 milliGRAM(s) Oral at bedtime  furosemide   Injectable 60 milliGRAM(s) IV Push every 12 hours  lisinopril 5 milliGRAM(s) Oral daily  metoprolol tartrate 25 milliGRAM(s) Oral two times a day    MEDICATIONS  (PRN):  ALBUTerol    90 MICROgram(s) HFA Inhaler 2 Puff(s) Inhalation every 6 hours PRN Shortness of Breath and/or Wheezing  hydrALAZINE Injectable 10 milliGRAM(s) IV Push every 6 hours PRN FOR SBP > 180      Allergies    penicillin (Other)    Intolerances        REVIEW OF SYSTEMS :  * CONSTITUTIONAL      : No Fever, Weight loss, or Fatigue  * EYES                             : No eye pain , Visual disturbances or Discharge  * RESPIRATORY             : , SOB . No Cough, Wheezing, Chills or Hemoptysis;  * CARDIOVASCULAR     : No Chest pain, Palpitations, Dizziness, or Leg swelling  * GASTROINTESTINAL  : No Abdominal or Epigastric pain. No Nausea, Vomiting or Hematemesis; No Diarrhea or Constipation. No Melena or Hematochezia.  * GENITOURINARY        : No Dysuria , Frequency , Haematuria   * NEUROLOGICAL          : No Headaches, Memory loss, Loss of trength, Numbness, or Tremors  * MUSCULOSKELETAL   : No Joint pain  * PSYCHIATRY                 : No Depression or Anxiety   * HEME/LYMPH              : No Easy Bruising or Bleeding gums  * SKIN                               : No Itching, Burning, Rashes, or Lesions     Vital Signs Last 24 Hrs  T(C): 36.8 (23 Sep 2018 05:31), Max: 37.2 (23 Sep 2018 00:05)  T(F): 98.2 (23 Sep 2018 05:31), Max: 98.9 (23 Sep 2018 00:05)  HR: 97 (23 Sep 2018 05:31) (97 - 110)  BP: 150/99 (23 Sep 2018 05:31) (128/89 - 209/102)  BP(mean): --  RR: 16 (23 Sep 2018 05:31) (16 - 18)  SpO2: 99% (23 Sep 2018 05:31) (95% - 100%)    PHYSICAL EXAM :  * GENERAL                 : NAD, Well-groomed, Well-developed  * HEAD                       :  Atraumatic, Normocephalic  * EYES                         : EOMI, PERRLA, Conjunctiva and Sclera clear  * ENT                           : Moist Mucous Membranes  * NECK                         : Supple, No JVD, Normal Thyroid  * CHEST/LUNG           : Clear to Auscultation bilaterally; No Rales, Rhonchi, Wheezing or Rubs  * HEART                       : Regular Rate and Rhythm; No murmurs, Rubs or gallops  * ABDOMEN                : Soft, Non-tender, Non-distended; Bowel Sounds present  * NERVOUS SYSTEM  :  Alert & Oriented X3, Good Concentration; Motor Strength 5/5 B/L UL LL ; DTRs 2+ Intact and Symmetric  * EXTREMITIES            :  2+ Peripheral Pulses, No clubbing, cyanosis, or edema  * SKIN                           : No Rashes or Lesions    LABS:                          13.0   11.1  )-----------( 347      ( 22 Sep 2018 03:36 )             40.4     09-22    139  |  102  |  14  ----------------------------<  90  3.1<L>   |  28  |  1.08    Ca    8.8      22 Sep 2018 17:11    TPro  7.5  /  Alb  3.6  /  TBili  1.4<H>  /  DBili  x   /  AST  30  /  ALT  42  /  AlkPhos  81  09-21    PT/INR - ( 21 Sep 2018 21:25 )   PT: 13.3 sec;   INR: 1.22 ratio             CAPILLARY BLOOD GLUCOSE          RADIOLOGY & ADDITIONAL TESTS:     Patient name: SANTY BREAUX  YOB: 1975   Age: 43 (F)   MR#: [id number]  Study Date: 9/22/2018  Location: [address] [place], [place] [address]onographer: Kylie Fletcher [facility]  Study quality: Technically good  Referring Physician:  NATALEE JONES MD  Blood Pressure: 170/98 mmHg  Height: 172 cm  Weight: 109 kg  BSA: 2.2 m2  ------------------------------------------------------------------------    PROCEDURE: Transthoracic echocardiogram with 2-D, M-Mode  and complete spectral and color flow Doppler.  INDICATION: Dyspnea, unspecified (R06.00)  HISTORY:  ------------------------------------------------------------------------  DIMENSIONS:  Dimensions:     Normal Values:  LA:     4.5 cm    2.0 - 4.0 cm  Ao:     3.7 cm    2.0 - 3.8 cm  SEPTUM: 1.1 cm    0.6 - 1.2 cm  PWT:    0.9 cm    0.6 - 1.1 cm  LVIDd:  6.0 cm    3.0 - 5.6 cm  LVIDs:  4.9 cm    1.8 - 4.0 cm      Derived Variables:  LVMI: 112 g/m2  RWT: 0.30  Ejection Fraction Visual Estimate: 40 %    ------------------------------------------------------------------------  OBSERVATIONS:  Mitral Valve: Normal mitral valve. Moderate to severe  mitral regurgitation.  Aortic Root: Aortic Root: 3.7 cm.    Aortic Valve: Normal trileaflet aortic valve.  Left Atrium: Moderate left atrial enlargement.  Left Ventricle: Mild global left ventricular systolic  dysfunction.40-45% Moderate left ventricular enlargement.  Grade IV diastolic dysfunction.  Right Heart: Normal right atrium. Normal right ventricular  size and function. Normal tricuspid valve. Normal pulmonic  valve.  Pericardium/PleuraNormal pericardium with no pericardial  effusion.  ------------------------------------------------------------------------  CONCLUSIONS:  1. Moderate to severe mitral regurgitation.  2. Moderate left atrial enlargement.  3. Moderate left ventricular enlargement.  4. Mild global left ventricular systolic dysfunction.40-45%  5. Grade IV diastolic dysfunction.      Imaging Personally Reviewed   :  [X] YES  [ ] NO    Consultant(s) Notes Reviewed :  [ ] YES  [ ] NO

## 2018-09-23 NOTE — DISCHARGE NOTE ADULT - NSTOBACCOHOTLINE_GEN_A_CS
[facility] Smokers Quitline (503-UX-FWGVV) [facility] Smokers Quitline (363-QZ-MIJAZ) [facility] Smokers Quitline (718-GK-VSZXI) [facility] Smokers Quitline (934-SC-QCOVW) [facility] Smokers Quitline (456-KP-IHALV)

## 2018-09-23 NOTE — PROGRESS NOTE ADULT - ASSESSMENT
HPI: 42 y/o Female pt with a PMHx of Asthma on Albuterol inhaler and HTN( Non complaint with her medications) H and no significant PSHx presents to ED c/o several days of gradual onset, gradually worsening SOB, GUILLERMO, and orthopnea.  Patient admits that she did not follow a diet after she was diagnosed with elevated blood pressure and does not have regular follow up with a PMD. Patient denies recent travel, trauma, surgery, hypercoagulation in self or family, cancer Hx, lower extremity edema, hemoptysis, Chest Pain, cough, exertional CP, or any other complaints. Allergies: Penicillin    Patient states that she went to [facility] where they found her Blood pressure was elevated around the 200 so they sent her to [facility]. Her initial blood pressure upon arrival 213/141 with a heart rate of 122. ED work up was done   First set of troponin 0.059 with an elevated BNP: 3404. CT angio was done to R/O PE. CT showed: No pulmonary embolism in the main pulmonary arteries, lobar branches, and  segmental branches. but showed: Cardiomegaly with evidence of right heart failure. Alveolar and  interstitial lung edema and small bilateral pleural effusions (right greater than left). Dilated main pulmonary artery which may be reflective of underlying pulmonary arterial hypertension.    In the ED patient was given Lasix 80mg IV x dose and Nitroglycerin. Repeated Blood pressure was done and repeat showed 210/145 with elevated HR of 119.     Sign out was given to by the ED to medical team      Patient evaluated at bedside. Looking SOB. Physical exam significant with Bibasilar crackles. VS Significant for  /150 and . EKG showing no ST segment elevation with Non specific T wave inversion V2-V3 and T wave flattening in V4-V5      Patient will be admitted to the floor for new onset CHF with Right heart Failure and NSTEMI

## 2018-09-23 NOTE — DISCHARGE NOTE ADULT - HOSPITAL COURSE
44 y/o Female pt with a PMHx of Asthma on Albuterol inhaler and HTN( Non complaint with her medications) H and no significant PSHx presents to ED c/o several days of gradual onset, gradually worsening SOB, GUILLERMO, and orthopnea.  Patient admits that she did not follow a diet after she was diagnosed with elevated blood pressure and does not have regular follow up with a PMD. Patient denies recent travel, trauma, surgery, hypercoagulation in self or family, cancer Hx, lower extremity edema, hemoptysis, Chest Pain, cough, exertional CP, or any other complaints. Allergies: Penicillin  Patient states that she went to [facility] where they found her Blood pressure was elevated around the 200 so they sent her to [facility]. Her initial blood pressure upon arrival 213/141 with a heart rate of 122. ED work up was done First set of troponin 0.059 with an elevated BNP: 3404. CT angio was done to R/O PE. CT showed: No pulmonary embolism in the main pulmonary arteries, lobar branches, and  segmental branches. but showed: Cardiomegaly with evidence of right heart failure. Alveolar and  interstitial lung edema and small bilateral pleural effusions (right greater than left). Dilated main pulmonary artery which may be reflective of underlying pulmonary arterial hypertension.  In the ED patient was given Lasix 80mg IV x dose and Nitroglycerin. Repeated Blood pressure was done and repeat showed 210/145 with elevated HR of 119.   Patient evaluated at bedside. Looking SOB. Physical exam significant with Bibasilar crackles. VS Significant for  /150 and . EKG showing no ST segment elevation with Non specific T wave inversion V2-V3 and T wave flattening in V4-V5 . New onset Right Heart Failure . Causes include uncontrolled BP vs NSTEMI .  Troponin -ve . ED gave 80mg IV lasix . Asa 325 mg given, started on Heparin drip while trending troponin . Patient diuresing well will continue IV Lasix 60mg IV BID . ECHO showed Ejection Fraction 40 % , Grade 4 Diastolic Dysfunction , severe Mitral Regurge . Cardiology Dr Jensen consulted . Given patient's improved clinical status and current hemodynamic stability, decision was made to discharge the patient. . Patient is stable for discharge per attending and is advised to follow up with PCP as outpatient . Please refer to patient's complete medical chart with documents for a full hospital course, for this is only a brief summary. Mr. Solomon is a 44 y/o Female pt with a PMHx of Asthma on Albuterol inhaler and HTN(Non compliant with her medications) and no significant PSHx presents to ED c/o several days of gradual onset, gradually worsening SOB, GUILLERMO, and orthopnea. Patient admits that she did not follow a diet after she was diagnosed with elevated blood pressure and does not have regular follow up with a PMD. Patient denied recent travel, trauma, surgery, hypercoagulation in self or family, cancer Hx, lower extremity edema, hemoptysis, chest Pain, cough, exertional CP, or any other complaints.    Her initial blood pressure upon arrival to ED was 213/141 with a heart rate of 122. First set of troponin 0.059 with an elevated BNP: 3404. CT angio was done and R/O PE. CT showed: No pulmonary embolism in the main pulmonary arteries, lobar branches, and segmental branches but showed cardiomegaly with evidence of right heart failure. Alveolar and interstitial lung edema and small bilateral pleural effusions (R > L). Dilated main pulmonary artery which may be reflective of underlying pulmonary arterial hypertension. In the ED, patient was given Lasix 80mg IV x dose and Nitroglycerin. Repeated Blood pressure was done and repeat showed 210/145 with elevated HR of 119. EKG showed no ST segment elevation with Non specific T wave inversion V2-V3 and T wave flattening in V4-V5.    He was transferred to Medicine for new onset CHF with Right heart Failure, likely secondary to uncontrolled HTN, and NSTEMI. Patient was c/w IV Lasix IV BID and had good diuretic response. ECHO showed Ejection Fraction 40%, grade 4 diastolic dysfunction, severe mitral regurgitation. Patient was advised to complete outpt sleep studies to r/o CHRISTIN as a possible cause of his recent heart failure. Aldactone was added in addition to better control his BP. For optimal BP management she was started on hydralazine 10 mg iv Q 6HRS in addition to the above mentioned medications. Stress test performed 9/25 showed global hypokinesis with post-stress resting myocardial perfusion gated SPECT imaging performed showing LVEF = 31 %. Mr. Solomon is a 44 y/o Female pt with a PMHx of Asthma on Albuterol inhaler and HTN(Non compliant with her medications) and no significant PSHx presents to ED c/o several days of gradual onset, gradually worsening SOB, GUILLERMO, and orthopnea. Patient admits that she did not follow a diet after she was diagnosed with elevated blood pressure and does not have regular follow up with a PMD. Patient denied recent travel, trauma, surgery, hypercoagulation in self or family, cancer Hx, lower extremity edema, hemoptysis, chest Pain, cough, exertional CP, or any other complaints.    Her initial blood pressure upon arrival to ED was 213/141 with a heart rate of 122. First set of troponin 0.059 with an elevated BNP: 3404. CT angio was done and R/O PE. CT showed: No pulmonary embolism in the main pulmonary arteries, lobar branches, and segmental branches but showed cardiomegaly with evidence of right heart failure. Alveolar and interstitial lung edema and small bilateral pleural effusions (R > L). Dilated main pulmonary artery which may be reflective of underlying pulmonary arterial hypertension. In the ED, patient was given Lasix 80mg IV x dose and Nitroglycerin. Repeated Blood pressure was done and repeat showed 210/145 with elevated HR of 119. EKG showed no ST segment elevation with Non specific T wave inversion V2-V3 and T wave flattening in V4-V5.    He was transferred to Medicine for new onset CHF with Right heart Failure, likely secondary to uncontrolled HTN, and NSTEMI. Patient was c/w IV Lasix IV BID and had good diuretic response. ECHO showed Ejection Fraction 40%, grade 4 diastolic dysfunction, severe mitral regurgitation. Patient was advised to complete outpt sleep studies to r/o CHRISTIN as a possible cause of his recent heart failure. Aldactone was added in addition to better control his BP. For optimal BP management she was started on hydralazine 10 mg iv Q 6HRS in addition to the above mentioned medications. Stress test performed 9/25 showed global hypokinesis with post-stress resting myocardial perfusion gated SPECT imaging performed showing LVEF = 31 %. For the low EF, patient was discharged with LifeVest.

## 2018-09-23 NOTE — DISCHARGE NOTE ADULT - NS AS DC HF EDUCATION INSTRUCTIONS
Low salt diet/Monitor Weight Daily/Report weight gain of 2 or more pounds in one day or 3 or more pounds in one week, worsening shortness of breath, fatigue, weakness, increased swelling of hands and feet to primary care provider/Call Primary Care Provider for follow-up after discharge/Activities as tolerated

## 2018-09-23 NOTE — PROGRESS NOTE ADULT - PROBLEM SELECTOR PLAN 2
Patient with history of poor blood pressure control  Managed with IV hydralazine 10 mg iv Q 6HRS with good response - history of poor blood pressure control  - c/w IV hydralazine 10 mg iv Q 6HRS

## 2018-09-23 NOTE — DISCHARGE NOTE ADULT - PLAN OF CARE
New onset Right Heart Failure  - You were given IV Lasix   - ECHO showed Ejection Fraction 40 % , Grade 4 Diastolic Dysfunction , severe Mitral Regurge   - Cardiology Dr Jensen consulted  - You are medically stable to be discharged from the hospital.  - You need to follow up with your Primary Care Physician in 1 week.  - You need to continue your medications regularly as prescribed. Blood Pressure Control , Please continue current medication regimen, and follow up with your PCP - You have a history of Hypertension.   - Your Blood Pressure was adequately controlled with   - You should continue on the current antihypertensive regimen regularly.  - You blood pressure should be within 140-120/80-90.  - You should follow-up with your PCP within 1 week of your discharge.   - You should maintain healthy lifestyle by eating healthy low salt diet, avoid fatty food, weight loss, exercise regularly as tolerated 30 mins X 3 time per week. - You have a history of Asthma  - You need to continue on Albuterol PRN for wheezing Prevention of future recurrences New onset Right Heart Failure  - You were given IV Lasix   - ECHO showed Ejection Fraction 40 % , Grade 4 Diastolic Dysfunction , severe Mitral Regurge   - Cardiology Dr Jensen consulted  - You are medically stable to be discharged from the hospital.  - You need to follow up with your Primary Care Physician in 1 week.  - You need to continue your medications regularly as prescribed.  - Your nuclear stress test showed LVEF 31% concerning for non-ischemic cardiomyopathy. Wear the LifeVest prescribed to you. New onset Right Heart Failure  - You were given IV Lasix   - ECHO showed Ejection Fraction 40 % , Grade 4 Diastolic Dysfunction , severe Mitral Regurge   - Cardiology Dr Jensen consulted  - You are medically stable to be discharged from the hospital.  - You need to follow up with your Primary Care Physician in 1 week.  - You need to continue your medications regularly as prescribed.  - Your nuclear stress test showed LVEF 31% concerning for non-ischemic cardiomyopathy. Wear the LifeVest prescribed to you.  - Follow up with Cardiologist Dr. Geena Jensen MD  [address] [place] [id number] [phone]

## 2018-09-23 NOTE — DISCHARGE NOTE ADULT - NS AS DC FOLLOWUP STROKE INST
Heart Failure/Stroke (includes: TIA/SAH/ICH/Ischemic Stroke) Heart Failure/Stroke (includes: TIA/SAH/ICH/Ischemic Stroke)/Smoking Cessation

## 2018-09-23 NOTE — DISCHARGE NOTE ADULT - CARE PLAN
Principal Discharge DX:	Acute congestive heart failure, unspecified heart failure type  Assessment and plan of treatment:	New onset Right Heart Failure  - You were given IV Lasix   - ECHO showed Ejection Fraction 40 % , Grade 4 Diastolic Dysfunction , severe Mitral Regurge   - Cardiology Dr Jensen consulted  - You are medically stable to be discharged from the hospital.  - You need to follow up with your Primary Care Physician in 1 week.  - You need to continue your medications regularly as prescribed.  Secondary Diagnosis:	Hypertensive emergency  Goal:	Blood Pressure Control , Please continue current medication regimen, and follow up with your PCP  Assessment and plan of treatment:	- You have a history of Hypertension.   - Your Blood Pressure was adequately controlled with   - You should continue on the current antihypertensive regimen regularly.  - You blood pressure should be within 140-120/80-90.  - You should follow-up with your PCP within 1 week of your discharge.   - You should maintain healthy lifestyle by eating healthy low salt diet, avoid fatty food, weight loss, exercise regularly as tolerated 30 mins X 3 time per week.  Secondary Diagnosis:	Asthma  Assessment and plan of treatment:	- You have a history of Asthma  - You need to continue on Albuterol PRN for wheezing Principal Discharge DX:	Acute congestive heart failure, unspecified heart failure type  Goal:	Prevention of future recurrences  Assessment and plan of treatment:	New onset Right Heart Failure  - You were given IV Lasix   - ECHO showed Ejection Fraction 40 % , Grade 4 Diastolic Dysfunction , severe Mitral Regurge   - Cardiology Dr Jensen consulted  - You are medically stable to be discharged from the hospital.  - You need to follow up with your Primary Care Physician in 1 week.  - You need to continue your medications regularly as prescribed.  Secondary Diagnosis:	Hypertensive emergency  Goal:	Blood Pressure Control , Please continue current medication regimen, and follow up with your PCP  Assessment and plan of treatment:	- You have a history of Hypertension.   - Your Blood Pressure was adequately controlled with   - You should continue on the current antihypertensive regimen regularly.  - You blood pressure should be within 140-120/80-90.  - You should follow-up with your PCP within 1 week of your discharge.   - You should maintain healthy lifestyle by eating healthy low salt diet, avoid fatty food, weight loss, exercise regularly as tolerated 30 mins X 3 time per week.  Secondary Diagnosis:	Asthma  Goal:	Prevention of future recurrences  Assessment and plan of treatment:	- You have a history of Asthma  - You need to continue on Albuterol PRN for wheezing Principal Discharge DX:	Acute congestive heart failure, unspecified heart failure type  Goal:	Prevention of future recurrences  Assessment and plan of treatment:	New onset Right Heart Failure  - You were given IV Lasix   - ECHO showed Ejection Fraction 40 % , Grade 4 Diastolic Dysfunction , severe Mitral Regurge   - Cardiology Dr Jensen consulted  - You are medically stable to be discharged from the hospital.  - You need to follow up with your Primary Care Physician in 1 week.  - You need to continue your medications regularly as prescribed.  - Your nuclear stress test showed LVEF 31% concerning for non-ischemic cardiomyopathy. Wear the LifeVest prescribed to you.  Secondary Diagnosis:	Hypertensive emergency  Goal:	Blood Pressure Control , Please continue current medication regimen, and follow up with your PCP  Assessment and plan of treatment:	- You have a history of Hypertension.   - Your Blood Pressure was adequately controlled with   - You should continue on the current antihypertensive regimen regularly.  - You blood pressure should be within 140-120/80-90.  - You should follow-up with your PCP within 1 week of your discharge.   - You should maintain healthy lifestyle by eating healthy low salt diet, avoid fatty food, weight loss, exercise regularly as tolerated 30 mins X 3 time per week.  Secondary Diagnosis:	Asthma  Goal:	Prevention of future recurrences  Assessment and plan of treatment:	- You have a history of Asthma  - You need to continue on Albuterol PRN for wheezing Principal Discharge DX:	Acute congestive heart failure, unspecified heart failure type  Goal:	Prevention of future recurrences  Assessment and plan of treatment:	New onset Right Heart Failure  - You were given IV Lasix   - ECHO showed Ejection Fraction 40 % , Grade 4 Diastolic Dysfunction , severe Mitral Regurge   - Cardiology Dr Jensen consulted  - You are medically stable to be discharged from the hospital.  - You need to follow up with your Primary Care Physician in 1 week.  - You need to continue your medications regularly as prescribed.  - Your nuclear stress test showed LVEF 31% concerning for non-ischemic cardiomyopathy. Wear the LifeVest prescribed to you.  - Follow up with Cardiologist Dr. Geena Jensen MD  [address] [facility] [id number] [phone]  Secondary Diagnosis:	Hypertensive emergency  Goal:	Blood Pressure Control , Please continue current medication regimen, and follow up with your PCP  Assessment and plan of treatment:	- You have a history of Hypertension.   - Your Blood Pressure was adequately controlled with   - You should continue on the current antihypertensive regimen regularly.  - You blood pressure should be within 140-120/80-90.  - You should follow-up with your PCP within 1 week of your discharge.   - You should maintain healthy lifestyle by eating healthy low salt diet, avoid fatty food, weight loss, exercise regularly as tolerated 30 mins X 3 time per week.  Secondary Diagnosis:	Asthma  Goal:	Prevention of future recurrences  Assessment and plan of treatment:	- You have a history of Asthma  - You need to continue on Albuterol PRN for wheezing

## 2018-09-23 NOTE — PROGRESS NOTE ADULT - PROBLEM SELECTOR PLAN 1
New onset Right Heart Failure  Causes include uncontrolled BP vs NSTEMI  Trending Troponin T1 0.53 Check T2 T3  ED gave 80mg IV lasix  Management:  - Asa 325 mg given, started on Heparin drip while trending troponin  - ASA + Lipitor  - Patient diuresing well will continue IV Lasix 60mg IV BID  - Check Echocardiogram  - Stricts I/O and daily Weights  - Cardiology Dr Jensen New onset Right Heart Failure  - c/w IV Lasix 60mg IV BID  - ECHO showed Ejection Fraction 40 % , Grade 4 Diastolic Dysfunction , severe Mitral Regurge   - Cardiology Dr Jensen

## 2018-09-23 NOTE — DISCHARGE NOTE ADULT - MEDICATION SUMMARY - MEDICATIONS TO TAKE
I will START or STAY ON the medications listed below when I get home from the hospital:    spironolactone 25 mg oral tablet  -- 1 tab(s) by mouth once a day  -- Indication: For Heart failure    lisinopril 5 mg oral tablet  -- 1 tab(s) by mouth 2 times a day  -- Indication: For Heart failure    atorvastatin 40 mg oral tablet  -- 1 tab(s) by mouth once a day (at bedtime)  -- Indication: For Prophylactic measure    Metoprolol Tartrate 25 mg oral tablet  -- 1 tab(s) by mouth 2 times a day  -- Indication: For Hypertension    albuterol 90 mcg/inh inhalation aerosol  -- 2 puff(s) inhaled every 6 hours, As needed, Shortness of Breath and/or Wheezing  -- Indication: For Asthma    furosemide 20 mg oral tablet  -- 1 tab(s) by mouth once a day  -- Indication: For Heart failure I will START or STAY ON the medications listed below when I get home from the hospital:    spironolactone 25 mg oral tablet  -- 1 tab(s) by mouth once a day  -- Indication: For Heart failure    lisinopril 5 mg oral tablet  -- 1 tab(s) by mouth 2 times a day  -- Indication: For Heart failure    atorvastatin 40 mg oral tablet  -- 1 tab(s) by mouth once a day (at bedtime)  -- Indication: For Prophylactic measure    Metoprolol Tartrate 25 mg oral tablet  -- 1 tab(s) by mouth 2 times a day  -- Indication: For Heart failure    albuterol 90 mcg/inh inhalation aerosol  -- 2 puff(s) inhaled every 6 hours, As needed, Shortness of Breath and/or Wheezing  -- Indication: For Asthma    furosemide 20 mg oral tablet  -- 1 tab(s) by mouth once a day  -- Indication: For Heart failure I will START or STAY ON the medications listed below when I get home from the hospital:    spironolactone 25 mg oral tablet  -- 1 tab(s) by mouth once a day  -- Indication: For Heart failure    aspirin 81 mg oral tablet, chewable  -- 1 tab(s) by mouth once a day  -- Indication: For cardioprotective     lisinopril 5 mg oral tablet  -- 1 tab(s) by mouth 2 times a day  -- Indication: For Heart failure    atorvastatin 40 mg oral tablet  -- 1 tab(s) by mouth once a day (at bedtime)  -- Indication: For Prophylactic measure    Metoprolol Tartrate 25 mg oral tablet  -- 1 tab(s) by mouth 2 times a day  -- Indication: For Heart failure    albuterol 90 mcg/inh inhalation aerosol  -- 2 puff(s) inhaled every 6 hours, As needed, Shortness of Breath and/or Wheezing  -- Indication: For Asthma    furosemide 20 mg oral tablet  -- 1 tab(s) by mouth once a day  -- Indication: For Heart failure

## 2018-09-24 LAB
ANION GAP SERPL CALC-SCNC: 11 MMOL/L — SIGNIFICANT CHANGE UP (ref 5–17)
BASOPHILS # BLD AUTO: 0.1 K/UL — SIGNIFICANT CHANGE UP (ref 0–0.2)
BASOPHILS NFR BLD AUTO: 1.2 % — SIGNIFICANT CHANGE UP (ref 0–2)
BUN SERPL-MCNC: 23 MG/DL — HIGH (ref 7–18)
CALCIUM SERPL-MCNC: 8.9 MG/DL — SIGNIFICANT CHANGE UP (ref 8.4–10.5)
CHLORIDE SERPL-SCNC: 100 MMOL/L — SIGNIFICANT CHANGE UP (ref 96–108)
CO2 SERPL-SCNC: 26 MMOL/L — SIGNIFICANT CHANGE UP (ref 22–31)
CREAT SERPL-MCNC: 1.17 MG/DL — SIGNIFICANT CHANGE UP (ref 0.5–1.3)
EOSINOPHIL # BLD AUTO: 0.3 K/UL — SIGNIFICANT CHANGE UP (ref 0–0.5)
EOSINOPHIL NFR BLD AUTO: 4.7 % — SIGNIFICANT CHANGE UP (ref 0–6)
GLUCOSE SERPL-MCNC: 97 MG/DL — SIGNIFICANT CHANGE UP (ref 70–99)
HCT VFR BLD CALC: 42.7 % — SIGNIFICANT CHANGE UP (ref 34.5–45)
HGB BLD-MCNC: 13.2 G/DL — SIGNIFICANT CHANGE UP (ref 11.5–15.5)
LYMPHOCYTES # BLD AUTO: 1.5 K/UL — SIGNIFICANT CHANGE UP (ref 1–3.3)
LYMPHOCYTES # BLD AUTO: 25.9 % — SIGNIFICANT CHANGE UP (ref 13–44)
MAGNESIUM SERPL-MCNC: 2.3 MG/DL — SIGNIFICANT CHANGE UP (ref 1.6–2.6)
MCHC RBC-ENTMCNC: 31 GM/DL — LOW (ref 32–36)
MCHC RBC-ENTMCNC: 33.3 PG — SIGNIFICANT CHANGE UP (ref 27–34)
MCV RBC AUTO: 107.4 FL — HIGH (ref 80–100)
MONOCYTES # BLD AUTO: 0.6 K/UL — SIGNIFICANT CHANGE UP (ref 0–0.9)
MONOCYTES NFR BLD AUTO: 10.4 % — SIGNIFICANT CHANGE UP (ref 2–14)
NEUTROPHILS # BLD AUTO: 3.4 K/UL — SIGNIFICANT CHANGE UP (ref 1.8–7.4)
NEUTROPHILS NFR BLD AUTO: 57.9 % — SIGNIFICANT CHANGE UP (ref 43–77)
PHOSPHATE SERPL-MCNC: 4.9 MG/DL — HIGH (ref 2.5–4.5)
PLATELET # BLD AUTO: 393 K/UL — SIGNIFICANT CHANGE UP (ref 150–400)
POTASSIUM SERPL-MCNC: 3.5 MMOL/L — SIGNIFICANT CHANGE UP (ref 3.5–5.3)
POTASSIUM SERPL-SCNC: 3.5 MMOL/L — SIGNIFICANT CHANGE UP (ref 3.5–5.3)
RBC # BLD: 3.98 M/UL — SIGNIFICANT CHANGE UP (ref 3.8–5.2)
RBC # FLD: 14.6 % — HIGH (ref 10.3–14.5)
SODIUM SERPL-SCNC: 137 MMOL/L — SIGNIFICANT CHANGE UP (ref 135–145)
WBC # BLD: 5.9 K/UL — SIGNIFICANT CHANGE UP (ref 3.8–10.5)
WBC # FLD AUTO: 5.9 K/UL — SIGNIFICANT CHANGE UP (ref 3.8–10.5)

## 2018-09-24 RX ORDER — SPIRONOLACTONE 25 MG/1
25 TABLET, FILM COATED ORAL DAILY
Qty: 0 | Refills: 0 | Status: DISCONTINUED | OUTPATIENT
Start: 2018-09-24 | End: 2018-09-26

## 2018-09-24 RX ORDER — ALBUTEROL 90 UG/1
2 AEROSOL, METERED ORAL
Qty: 0 | Refills: 0 | DISCHARGE
Start: 2018-09-24

## 2018-09-24 RX ORDER — SPIRONOLACTONE 25 MG/1
1 TABLET, FILM COATED ORAL
Qty: 30 | Refills: 0 | OUTPATIENT
Start: 2018-09-24 | End: 2018-10-23

## 2018-09-24 RX ORDER — FUROSEMIDE 40 MG
20 TABLET ORAL DAILY
Qty: 0 | Refills: 0 | Status: DISCONTINUED | OUTPATIENT
Start: 2018-09-24 | End: 2018-09-25

## 2018-09-24 RX ORDER — LISINOPRIL 2.5 MG/1
5 TABLET ORAL
Qty: 0 | Refills: 0 | Status: DISCONTINUED | OUTPATIENT
Start: 2018-09-24 | End: 2018-09-26

## 2018-09-24 RX ADMIN — LISINOPRIL 5 MILLIGRAM(S): 2.5 TABLET ORAL at 06:19

## 2018-09-24 RX ADMIN — Medication 25 MILLIGRAM(S): at 17:20

## 2018-09-24 RX ADMIN — Medication 60 MILLIGRAM(S): at 06:19

## 2018-09-24 RX ADMIN — Medication 81 MILLIGRAM(S): at 11:09

## 2018-09-24 RX ADMIN — Medication 20 MILLIGRAM(S): at 13:12

## 2018-09-24 RX ADMIN — Medication 25 MILLIGRAM(S): at 06:19

## 2018-09-24 RX ADMIN — LISINOPRIL 5 MILLIGRAM(S): 2.5 TABLET ORAL at 17:20

## 2018-09-24 RX ADMIN — ATORVASTATIN CALCIUM 40 MILLIGRAM(S): 80 TABLET, FILM COATED ORAL at 21:58

## 2018-09-24 NOTE — CONSULT NOTE ADULT - SUBJECTIVE AND OBJECTIVE BOX
CHIEF COMPLAINT:Patient is a 43y old  Female who presents with a chief complaint of SOB .      HPI:  44 y/o Female pt with a PMHx of Asthma on Albuterol inhaler,obesity  and HTN( Non complaint with her medications) H and no significant PSHx presents to ED c/o several days of gradual onset, gradually worsening SOB, GUILLERMO, and orthopnea.  Patient admits that she did not follow a diet after she was diagnosed with elevated blood pressure and does not have regular follow up with a PMD. Patient denies recent travel, trauma, surgery, hypercoagulation in self or family, cancer Hx, lower extremity edema, hemoptysis, Chest Pain, cough, exertional CP, or any other complaints. Allergies: Penicillin    Patient states that she went to [facility] [place] where they found her Blood pressure was elevated around the 200 so they sent her to [facility]. Her initial blood pressure upon arrival 213/141 with a heart rate of 122. ED work up was done   First set of troponin 0.059 with an elevated BNP: 3404. CT angio was done to R/O PE. CT showed: No pulmonary embolism in the main pulmonary arteries, lobar branches, and  segmental branches. but showed: Cardiomegaly with evidence of right heart failure. Alveolar and  interstitial lung edema and small bilateral pleural effusions (right greater than left). Dilated main pulmonary artery which may be reflective of underlying pulmonary arterial hypertension.    In the ED patient was given Lasix 80mg IV x dose and Nitroglycerin. Repeated Blood pressure was done and repeat showed 210/145 with elevated HR of 119.     Sign out was given to by the ED to medical team (22 Sep 2018 01:21)      PAST MEDICAL & SURGICAL HISTORY:  Noncompliance: for HTN medication for years  Asthma        MEDICATIONS  (STANDING):  aspirin  chewable 81 milliGRAM(s) Oral daily  atorvastatin 40 milliGRAM(s) Oral at bedtime  furosemide   Injectable 20 milliGRAM(s) IV Push daily  lisinopril 5 milliGRAM(s) Oral two times a day  metoprolol tartrate 25 milliGRAM(s) Oral two times a day  spironolactone 25 milliGRAM(s) Oral daily    MEDICATIONS  (PRN):  ALBUTerol    90 MICROgram(s) HFA Inhaler 2 Puff(s) Inhalation every 6 hours PRN Shortness of Breath and/or Wheezing      FAMILY HISTORY:Parents-HTN      SOCIAL HISTORY:    [x ] Non-smoker    [x ] Alcohol-denies    Allergies    penicillin (Other)    Intolerances    	    REVIEW OF SYSTEMS:  CONSTITUTIONAL: No fever, weight loss, or fatigue  EYES: No eye pain, visual disturbances, or discharge  ENT:  No difficulty hearing, tinnitus, vertigo; No sinus or throat pain  NECK: No pain or stiffness  RESPIRATORY: No cough, wheezing, chills or hemoptysis; + Shortness of Breath  CARDIOVASCULAR: No chest pain, palpitations, passing out, dizziness, or leg swelling  GASTROINTESTINAL: No abdominal or epigastric pain. No nausea, vomiting, or hematemesis; No diarrhea or constipation. No melena or hematochezia.  GENITOURINARY: No dysuria, frequency, hematuria, or incontinence  NEUROLOGICAL: No headaches, memory loss, loss of strength, numbness, or tremors  SKIN: No itching, burning, rashes, or lesions   LYMPH Nodes: No enlarged glands  ENDOCRINE: No heat or cold intolerance; No hair loss  MUSCULOSKELETAL: No joint pain or swelling; No muscle, back, or extremity pain  PSYCHIATRIC: No depression, anxiety, mood swings, or difficulty sleeping  HEME/LYMPH: No easy bruising, or bleeding gums  ALLERGY AND IMMUNOLOGIC: No hives or eczema	      PHYSICAL EXAM:  T(C): 36.8 (09-24-18 @ 07:34), Max: 36.9 (09-23-18 @ 23:59)  HR: 73 (09-24-18 @ 07:34) (73 - 94)  BP: 141/94 (09-24-18 @ 07:34) (119/73 - 142/98)  RR: 18 (09-24-18 @ 07:34) (16 - 18)  SpO2: 100% (09-24-18 @ 07:34) (93% - 100%)  Wt(kg): --  I&O's Summary    23 Sep 2018 07:01  -  24 Sep 2018 07:00  --------------------------------------------------------  IN: 625 mL / OUT: 550 mL / NET: 75 mL        Appearance: Normal	  HEENT:   Normal oral mucosa, PERRL, EOMI	  Lymphatic: No lymphadenopathy  Cardiovascular: Normal S1 S2, No JVD, No murmurs, No edema  Respiratory: Lungs clear to auscultation	  Psychiatry: A & O x 3, Mood & affect appropriate  Gastrointestinal:  Soft, Non-tender, + BS	  Skin: No rashes, No ecchymoses, No cyanosis	  Neurologic: Non-focal  Extremities: Normal range of motion, No clubbing, cyanosis or edema  Vascular: Peripheral pulses palpable 2+ bilaterally    	    ECG:  	sinus tach,vargas,lvh,non-specific t wave    	  LABS:	 	    CARDIAC MARKERS:  CARDIAC MARKERS ( 22 Sep 2018 10:01 )  0.054 ng/mL / x     / x     / x     / x                              13.2   5.9   )-----------( 393      ( 24 Sep 2018 08:11 )             42.7     09-24    137  |  100  |  23<H>  ----------------------------<  97  3.5   |  26  |  1.17    Ca    8.9      24 Sep 2018 08:11  Phos  4.9     09-24  Mg     2.3     09-24        OBSERVATIONS:  Mitral Valve: Normal mitral valve. Moderate to severe  mitral regurgitation.  Aortic Root: Aortic Root: 3.7 cm.  Aortic Valve: Normal trileaflet aortic valve.  Left Atrium: Moderate left atrial enlargement.  Left Ventricle: Mild global left ventricular systolic  dysfunction.40-45% Moderate left ventricular enlargement.  Grade IV diastolic dysfunction.  Right Heart: Normal right atrium. Normal right ventricular  size and function. Normal tricuspid valve. Normalpulmonic  valve.  Pericardium/PleuraNormal pericardium with no pericardial  effusion.

## 2018-09-24 NOTE — PROGRESS NOTE ADULT - ASSESSMENT
HPI: 44 y/o Female pt with a PMHx of Asthma on Albuterol inhaler and HTN( Non complaint with her medications) H and no significant PSHx presents to ED c/o several days of gradual onset, gradually worsening SOB, GUILLERMO, and orthopnea.  Patient admits that she did not follow a diet after she was diagnosed with elevated blood pressure and does not have regular follow up with a PMD. Patient denies recent travel, trauma, surgery, hypercoagulation in self or family, cancer Hx, lower extremity edema, hemoptysis, Chest Pain, cough, exertional CP, or any other complaints. Allergies: Penicillin    Patient states that she went to [facility] where they found her Blood pressure was elevated around the 200 so they sent her to [facility]. Her initial blood pressure upon arrival 213/141 with a heart rate of 122. ED work up was done   First set of troponin 0.059 with an elevated BNP: 3404. CT angio was done to R/O PE. CT showed: No pulmonary embolism in the main pulmonary arteries, lobar branches, and  segmental branches. but showed: Cardiomegaly with evidence of right heart failure. Alveolar and  interstitial lung edema and small bilateral pleural effusions (right greater than left). Dilated main pulmonary artery which may be reflective of underlying pulmonary arterial hypertension.    In the ED patient was given Lasix 80mg IV x dose and Nitroglycerin. Repeated Blood pressure was done and repeat showed 210/145 with elevated HR of 119.     Sign out was given to by the ED to medical team      Patient evaluated at bedside. Looking SOB. Physical exam significant with Bibasilar crackles. VS Significant for  /150 and . EKG showing no ST segment elevation with Non specific T wave inversion V2-V3 and T wave flattening in V4-V5      Patient will be admitted to the floor for new onset CHF with Right heart Failure and NSTEMI

## 2018-09-24 NOTE — PROGRESS NOTE ADULT - PROBLEM SELECTOR PLAN 1
New onset Right Heart Failure  -C/w IV Lasix 60mg IV BID  -ECHO showed Ejection Fraction 40% , Grade 4 Diastolic Dysfunction, severe Mitral Regurge   -Cardiology Dr Jensen  -Sleep study - R/O CHRISTIN  -CHF - change lasix to 20mg iv qd, add aldactone 25mg qd

## 2018-09-24 NOTE — CONSULT NOTE ADULT - ASSESSMENT
42 y/o Female pt with a PMHx of Asthma on Albuterol inhaler,obesity  and HTN( Non complaint with her medications) H and no significant PSHx presents to ED c/o several days of gradual onset, gradually worsening SOB, GUILLERMO, and orthopnea.  1.Tele monitoring.  2.CHF-change lasix 2omg iv qd,add aldactone 25mg qd.  3.HTN-cont b blocker,inc ace.  4.Sleep study-r/o alexei.  5.Weight loss.  6.Sleep study-R/O ALEXEI.  7.GI and DVT prophylaxis.

## 2018-09-24 NOTE — PROGRESS NOTE ADULT - SUBJECTIVE AND OBJECTIVE BOX
PGY 1 Note discussed with supervising resident and primary attending    Patient is a 43y old  Female who presents with a chief complaint of SOB (24 Sep 2018 09:27)      INTERVAL HPI/OVERNIGHT EVENTS: No acute events overnight, remains afebrile; HD stable, H/H stable, WBC WNL    MEDICATIONS  (STANDING):  aspirin  chewable 81 milliGRAM(s) Oral daily  atorvastatin 40 milliGRAM(s) Oral at bedtime  furosemide   Injectable 20 milliGRAM(s) IV Push daily  lisinopril 5 milliGRAM(s) Oral two times a day  metoprolol tartrate 25 milliGRAM(s) Oral two times a day  spironolactone 25 milliGRAM(s) Oral daily    MEDICATIONS  (PRN):  ALBUTerol    90 MICROgram(s) HFA Inhaler 2 Puff(s) Inhalation every 6 hours PRN Shortness of Breath and/or Wheezing      __________________________________________________  REVIEW OF SYSTEMS:    * CONSTITUTIONAL      : No Fever, Weight loss, or Fatigue  * EYES                             : No eye pain , Visual disturbances or Discharge  * RESPIRATORY             : , SOB . No Cough, Wheezing, Chills or Hemoptysis;  * CARDIOVASCULAR     : No Chest pain, Palpitations, Dizziness, or Leg swelling  * GASTROINTESTINAL  : No Abdominal or Epigastric pain. No Nausea, Vomiting or Hematemesis; No Diarrhea or Constipation. No Melena or Hematochezia.  * GENITOURINARY        : No Dysuria , Frequency , Haematuria   * NEUROLOGICAL          : No Headaches, Memory loss, Loss of strength, Numbness, or Tremors  * MUSCULOSKELETAL   : No Joint pain  * PSYCHIATRY                 : No Depression or Anxiety   * HEME/LYMPH              : No Easy Bruising or Bleeding gums  * SKIN                               : No Itching, Burning, Rashes, or Lesions       Vital Signs Last 24 Hrs  T(C): 36.4 (24 Sep 2018 15:59), Max: 36.9 (23 Sep 2018 23:59)  T(F): 97.5 (24 Sep 2018 15:59), Max: 98.5 (23 Sep 2018 23:59)  HR: 84 (24 Sep 2018 15:59) (73 - 93)  BP: 115/72 (24 Sep 2018 15:59) (115/72 - 142/98)  BP(mean): --  RR: 17 (24 Sep 2018 15:59) (16 - 18)  SpO2: 100% (24 Sep 2018 15:59) (93% - 100%)    ________________________________________________  PHYSICAL EXAM:    * GENERAL                 : NAD, Well-groomed, Well-developed  * HEAD                       :  Atraumatic, Normocephalic  * EYES                         : EOMI, PERRLA, Conjunctiva and Sclera clear  * ENT                           : Moist Mucous Membranes  * NECK                         : Supple, No JVD, Normal Thyroid  * CHEST/LUNG           : Clear to Auscultation bilaterally; No Rales, Rhonchi, Wheezing or Rubs  * HEART                       : Regular Rate and Rhythm; No murmurs, Rubs or gallops  * ABDOMEN                : Soft, Non-tender, Non-distended; Bowel Sounds present  * NERVOUS SYSTEM  :  Alert & Oriented X3, Good Concentration; Motor Strength 5/5 B/L UL LL ; DTRs 2+ Intact and Symmetric  * EXTREMITIES            :  2+ Peripheral Pulses, No clubbing, cyanosis, or edema  * SKIN                           : No Rashes or Lesions    _________________________________________________  LABS:                        13.2   5.9   )-----------( 393      ( 24 Sep 2018 08:11 )             42.7     09-24    137  |  100  |  23<H>  ----------------------------<  97  3.5   |  26  |  1.17    Ca    8.9      24 Sep 2018 08:11  Phos  4.9     09-24  Mg     2.3     09-24          CAPILLARY BLOOD GLUCOSE            RADIOLOGY & ADDITIONAL TESTS:    Imaging Personally Reviewed:  YES    Consultant(s) Notes Reviewed:   YES    Care Discussed with Consultants :     Plan of care was discussed with patient and /or primary care giver; all questions and concerns were addressed and care was aligned with patient's wishes.

## 2018-09-25 LAB
ANION GAP SERPL CALC-SCNC: 8 MMOL/L — SIGNIFICANT CHANGE UP (ref 5–17)
BASOPHILS # BLD AUTO: 0.1 K/UL — SIGNIFICANT CHANGE UP (ref 0–0.2)
BASOPHILS NFR BLD AUTO: 1.3 % — SIGNIFICANT CHANGE UP (ref 0–2)
BUN SERPL-MCNC: 33 MG/DL — HIGH (ref 7–18)
CALCIUM SERPL-MCNC: 9.2 MG/DL — SIGNIFICANT CHANGE UP (ref 8.4–10.5)
CHLORIDE SERPL-SCNC: 101 MMOL/L — SIGNIFICANT CHANGE UP (ref 96–108)
CO2 SERPL-SCNC: 28 MMOL/L — SIGNIFICANT CHANGE UP (ref 22–31)
CREAT SERPL-MCNC: 1.32 MG/DL — HIGH (ref 0.5–1.3)
EOSINOPHIL # BLD AUTO: 0.3 K/UL — SIGNIFICANT CHANGE UP (ref 0–0.5)
EOSINOPHIL NFR BLD AUTO: 4.2 % — SIGNIFICANT CHANGE UP (ref 0–6)
GLUCOSE SERPL-MCNC: 120 MG/DL — HIGH (ref 70–99)
HCT VFR BLD CALC: 43 % — SIGNIFICANT CHANGE UP (ref 34.5–45)
HGB BLD-MCNC: 13.4 G/DL — SIGNIFICANT CHANGE UP (ref 11.5–15.5)
LYMPHOCYTES # BLD AUTO: 1 K/UL — SIGNIFICANT CHANGE UP (ref 1–3.3)
LYMPHOCYTES # BLD AUTO: 17.1 % — SIGNIFICANT CHANGE UP (ref 13–44)
MCHC RBC-ENTMCNC: 31.2 GM/DL — LOW (ref 32–36)
MCHC RBC-ENTMCNC: 32.6 PG — SIGNIFICANT CHANGE UP (ref 27–34)
MCV RBC AUTO: 104.6 FL — HIGH (ref 80–100)
MONOCYTES # BLD AUTO: 0.6 K/UL — SIGNIFICANT CHANGE UP (ref 0–0.9)
MONOCYTES NFR BLD AUTO: 10.3 % — SIGNIFICANT CHANGE UP (ref 2–14)
NEUTROPHILS # BLD AUTO: 4.1 K/UL — SIGNIFICANT CHANGE UP (ref 1.8–7.4)
NEUTROPHILS NFR BLD AUTO: 67.2 % — SIGNIFICANT CHANGE UP (ref 43–77)
PLATELET # BLD AUTO: 424 K/UL — HIGH (ref 150–400)
POTASSIUM SERPL-MCNC: 3.9 MMOL/L — SIGNIFICANT CHANGE UP (ref 3.5–5.3)
POTASSIUM SERPL-SCNC: 3.9 MMOL/L — SIGNIFICANT CHANGE UP (ref 3.5–5.3)
RBC # BLD: 4.11 M/UL — SIGNIFICANT CHANGE UP (ref 3.8–5.2)
RBC # FLD: 13.8 % — SIGNIFICANT CHANGE UP (ref 10.3–14.5)
SODIUM SERPL-SCNC: 137 MMOL/L — SIGNIFICANT CHANGE UP (ref 135–145)
WBC # BLD: 6.1 K/UL — SIGNIFICANT CHANGE UP (ref 3.8–10.5)
WBC # FLD AUTO: 6.1 K/UL — SIGNIFICANT CHANGE UP (ref 3.8–10.5)

## 2018-09-25 RX ORDER — LISINOPRIL 2.5 MG/1
1 TABLET ORAL
Qty: 60 | Refills: 0 | OUTPATIENT
Start: 2018-09-25 | End: 2018-10-24

## 2018-09-25 RX ORDER — ATORVASTATIN CALCIUM 80 MG/1
1 TABLET, FILM COATED ORAL
Qty: 30 | Refills: 0 | OUTPATIENT
Start: 2018-09-25 | End: 2018-10-24

## 2018-09-25 RX ORDER — FUROSEMIDE 40 MG
20 TABLET ORAL DAILY
Qty: 0 | Refills: 0 | Status: DISCONTINUED | OUTPATIENT
Start: 2018-09-25 | End: 2018-09-26

## 2018-09-25 RX ORDER — METOPROLOL TARTRATE 50 MG
1 TABLET ORAL
Qty: 60 | Refills: 0 | OUTPATIENT
Start: 2018-09-25 | End: 2018-10-24

## 2018-09-25 RX ADMIN — LISINOPRIL 5 MILLIGRAM(S): 2.5 TABLET ORAL at 06:01

## 2018-09-25 RX ADMIN — Medication 81 MILLIGRAM(S): at 11:42

## 2018-09-25 RX ADMIN — SPIRONOLACTONE 25 MILLIGRAM(S): 25 TABLET, FILM COATED ORAL at 06:01

## 2018-09-25 RX ADMIN — Medication 25 MILLIGRAM(S): at 06:02

## 2018-09-25 RX ADMIN — ATORVASTATIN CALCIUM 40 MILLIGRAM(S): 80 TABLET, FILM COATED ORAL at 21:37

## 2018-09-25 RX ADMIN — Medication 25 MILLIGRAM(S): at 18:45

## 2018-09-25 RX ADMIN — LISINOPRIL 5 MILLIGRAM(S): 2.5 TABLET ORAL at 18:45

## 2018-09-25 RX ADMIN — Medication 20 MILLIGRAM(S): at 06:02

## 2018-09-25 RX ADMIN — Medication 20 MILLIGRAM(S): at 11:42

## 2018-09-25 NOTE — PROGRESS NOTE ADULT - ASSESSMENT
44 y/o Female pt with a PMHx of Asthma on Albuterol inhaler,obesity  and HTN( Non complaint with her medications) H and no significant PSHx presents to ED c/o several days of gradual onset, gradually worsening SOB, GUILLERMO, and orthopnea.  1.Tele monitoring.  2.CHF-change lasix 20mg po qd iv qd,cont aldactone 25mg qd.  3.HTN-cont b blocker, ace.  4.Sleep study-r/o alexei.  5.Weight loss.  6.Sleep study-R/O ALEXEI.  7.GI and DVT prophylaxis.  8.Stress test-R/O ischemia.

## 2018-09-25 NOTE — PROGRESS NOTE ADULT - PROBLEM SELECTOR PLAN 1
New onset right heart failure  -ECHO showed Ejection Fraction 40% , Grade 4 Diastolic Dysfunction, severe mitral regurgitation  -Cardiology Dr Jensen  -Sleep study - R/O CHRISTIN as a possible cause of CHF  -CHF - change lasix to 20mg iv qd, add aldactone 25mg qd per Cardiology  -Nuclear stress test performed 9/25 showed global hypokinesis with post-stress resting myocardial perfusion gated SPECT imaging showing LVEF=31%, c/f non-ischemic cardiomyopathy  -Patient would require LifeVest Wearable Defibrillator set up before DC

## 2018-09-25 NOTE — PROGRESS NOTE ADULT - PROBLEM SELECTOR PROBLEM 1
Acute congestive heart failure, unspecified heart failure type

## 2018-09-25 NOTE — PROGRESS NOTE ADULT - SUBJECTIVE AND OBJECTIVE BOX
PGY 1 Note discussed with supervising resident and primary attending    Patient is a 43y old  Female who presents with a chief complaint of SOB (25 Sep 2018 08:47)      INTERVAL HPI/OVERNIGHT EVENTS: No acute events overnight, remains afebrile; HD stable, H/H stable, WBC WNL.    MEDICATIONS  (STANDING):  aspirin  chewable 81 milliGRAM(s) Oral daily  atorvastatin 40 milliGRAM(s) Oral at bedtime  furosemide    Tablet 20 milliGRAM(s) Oral daily  lisinopril 5 milliGRAM(s) Oral two times a day  metoprolol tartrate 25 milliGRAM(s) Oral two times a day  spironolactone 25 milliGRAM(s) Oral daily    MEDICATIONS  (PRN):  ALBUTerol    90 MICROgram(s) HFA Inhaler 2 Puff(s) Inhalation every 6 hours PRN Shortness of Breath and/or Wheezing      __________________________________________________  REVIEW OF SYSTEMS:    * CONSTITUTIONAL      : No Fever, Weight loss, or Fatigue  * EYES                             : No eye pain , Visual disturbances or Discharge  * RESPIRATORY             : , SOB . No Cough, Wheezing, Chills or Hemoptysis;  * CARDIOVASCULAR     : No Chest pain, Palpitations, Dizziness, or Leg swelling  * GASTROINTESTINAL  : No Abdominal or Epigastric pain. No Nausea, Vomiting or Hematemesis; No Diarrhea or Constipation. No Melena or Hematochezia.  * GENITOURINARY        : No Dysuria , Frequency , Haematuria   * NEUROLOGICAL          : No Headaches, Memory loss, Loss of strength, Numbness, or Tremors  * MUSCULOSKELETAL   : No Joint pain  * PSYCHIATRY                 : No Depression or Anxiety   * HEME/LYMPH              : No Easy Bruising or Bleeding gums  * SKIN                               : No Itching, Burning, Rashes, or Lesions       Vital Signs Last 24 Hrs  T(C): 37 (25 Sep 2018 15:42), Max: 37 (25 Sep 2018 15:42)  T(F): 98.6 (25 Sep 2018 15:42), Max: 98.6 (25 Sep 2018 15:42)  HR: 79 (25 Sep 2018 15:42) (79 - 86)  BP: 123/75 (25 Sep 2018 15:42) (113/62 - 133/82)  BP(mean): --  RR: 18 (25 Sep 2018 15:42) (16 - 18)  SpO2: 100% (25 Sep 2018 15:42) (93% - 100%)    ________________________________________________  PHYSICAL EXAM:    * GENERAL                 : NAD, Well-groomed, Well-developed  * HEAD                       :  Atraumatic, Normocephalic  * EYES                         : EOMI, PERRLA, Conjunctiva and Sclera clear  * ENT                           : Moist Mucous Membranes  * NECK                         : Supple, No JVD, Normal Thyroid  * CHEST/LUNG           : Clear to Auscultation bilaterally; No Rales, Rhonchi, Wheezing or Rubs  * HEART                       : Regular Rate and Rhythm; No murmurs, Rubs or gallops  * ABDOMEN                : Soft, Non-tender, Non-distended; Bowel Sounds present  * NERVOUS SYSTEM  :  Alert & Oriented X3, Good Concentration; Motor Strength 5/5 B/L UL LL ; DTRs 2+ Intact and Symmetric  * EXTREMITIES            :  2+ Peripheral Pulses, No clubbing, cyanosis, or edema  * SKIN                           : No Rashes or Lesions    _________________________________________________  LABS:                        13.4   6.1   )-----------( 424      ( 25 Sep 2018 12:32 )             43.0     09-25    137  |  101  |  33<H>  ----------------------------<  120<H>  3.9   |  28  |  1.32<H>    Ca    9.2      25 Sep 2018 12:32  Phos  4.9     09-24  Mg     2.3     09-24          CAPILLARY BLOOD GLUCOSE            RADIOLOGY & ADDITIONAL TESTS:    Imaging Personally Reviewed:  YES    Consultant(s) Notes Reviewed:   YES    Care Discussed with Consultants :     Plan of care was discussed with patient and /or primary care giver; all questions and concerns were addressed and care was aligned with patient's wishes.

## 2018-09-25 NOTE — PROGRESS NOTE ADULT - ASSESSMENT
HPI: 42 y/o Female pt with a PMHx of Asthma on Albuterol inhaler and HTN( Non complaint with her medications) H and no significant PSHx presents to ED c/o several days of gradual onset, gradually worsening SOB, GUILLERMO, and orthopnea.  Patient admits that she did not follow a diet after she was diagnosed with elevated blood pressure and does not have regular follow up with a PMD. Patient denies recent travel, trauma, surgery, hypercoagulation in self or family, cancer Hx, lower extremity edema, hemoptysis, Chest Pain, cough, exertional CP, or any other complaints. Allergies: Penicillin    Patient states that she went to [facility] where they found her Blood pressure was elevated around the 200 so they sent her to [facility]. Her initial blood pressure upon arrival 213/141 with a heart rate of 122. ED work up was done   First set of troponin 0.059 with an elevated BNP: 3404. CT angio was done to R/O PE. CT showed: No pulmonary embolism in the main pulmonary arteries, lobar branches, and  segmental branches. but showed: Cardiomegaly with evidence of right heart failure. Alveolar and  interstitial lung edema and small bilateral pleural effusions (right greater than left). Dilated main pulmonary artery which may be reflective of underlying pulmonary arterial hypertension.    In the ED patient was given Lasix 80mg IV x dose and Nitroglycerin. Repeated Blood pressure was done and repeat showed 210/145 with elevated HR of 119.     Sign out was given to by the ED to medical team      Patient evaluated at bedside. Looking SOB. Physical exam significant with Bibasilar crackles. VS Significant for  /150 and . EKG showing no ST segment elevation with Non specific T wave inversion V2-V3 and T wave flattening in V4-V5      Patient will be admitted to the floor for new onset CHF with Right heart Failure and NSTEMI

## 2018-09-25 NOTE — PROGRESS NOTE ADULT - ATTENDING COMMENTS
Patient is seen and examined. Case reviewed with the medical team. Above note is appreciated. Will follow up clinically. Continue DVT prophylaxis. Case discussed with Cardiology, Dr. Jensen. Will follow up result of stress test. Sleep study as out patient.
Patient is seen and examined. Case reviewed with the medical team. Above note is appreciated. Will follow up clinically. Continue DVT prophylaxis. Case discussed with Cardiology. Stress test in am and sleep study as out patient to rule out sleep apnea.
Patient is seen and examined. Case reviewed with the medical team. Above note is appreciated. Will follow up clinically. Continue DVT prophylaxis. Case discussed with Cardiology. Cardiology will see patient in am.

## 2018-09-25 NOTE — PROGRESS NOTE ADULT - SUBJECTIVE AND OBJECTIVE BOX
CHIEF COMPLAINT:Patient is a 43y old  Female who presents with a chief complaint of SOB .Pt appears comfortable.    	  REVIEW OF SYSTEMS:  CONSTITUTIONAL: No fever, weight loss, or fatigue  EYES: No eye pain, visual disturbances, or discharge  ENT:  No difficulty hearing, tinnitus, vertigo; No sinus or throat pain  NECK: No pain or stiffness  RESPIRATORY: No cough, wheezing, chills or hemoptysis; No Shortness of Breath  CARDIOVASCULAR: No chest pain, palpitations, passing out, dizziness, or leg swelling  GASTROINTESTINAL: No abdominal or epigastric pain. No nausea, vomiting, or hematemesis; No diarrhea or constipation. No melena or hematochezia.  GENITOURINARY: No dysuria, frequency, hematuria, or incontinence  NEUROLOGICAL: No headaches, memory loss, loss of strength, numbness, or tremors  SKIN: No itching, burning, rashes, or lesions   LYMPH Nodes: No enlarged glands  ENDOCRINE: No heat or cold intolerance; No hair loss  MUSCULOSKELETAL: No joint pain or swelling; No muscle, back, or extremity pain  PSYCHIATRIC: No depression, anxiety, mood swings, or difficulty sleeping  HEME/LYMPH: No easy bruising, or bleeding gums  ALLERGY AND IMMUNOLOGIC: No hives or eczema	      PHYSICAL EXAM:  T(C): 36.7 (09-25-18 @ 08:28), Max: 36.9 (09-24-18 @ 19:43)  HR: 86 (09-25-18 @ 08:28) (80 - 86)  BP: 128/88 (09-25-18 @ 08:28) (113/62 - 133/82)  RR: 17 (09-25-18 @ 08:28) (16 - 17)  SpO2: 100% (09-25-18 @ 08:28) (98% - 100%)    Appearance: Normal	  HEENT:   Normal oral mucosa, PERRL, EOMI	  Lymphatic: No lymphadenopathy  Cardiovascular: Normal S1 S2, No JVD, No murmurs, No edema  Respiratory: Lungs clear to auscultation	  Psychiatry: A & O x 3, Mood & affect appropriate  Gastrointestinal:  Soft, Non-tender, + BS	  Skin: No rashes, No ecchymoses, No cyanosis	  Neurologic: Non-focal  Extremities: Normal range of motion, No clubbing, cyanosis or edema  Vascular: Peripheral pulses palpable 2+ bilaterally    MEDICATIONS  (STANDING):  aspirin  chewable 81 milliGRAM(s) Oral daily  atorvastatin 40 milliGRAM(s) Oral at bedtime  furosemide   Injectable 20 milliGRAM(s) IV Push daily  lisinopril 5 milliGRAM(s) Oral two times a day  metoprolol tartrate 25 milliGRAM(s) Oral two times a day  spironolactone 25 milliGRAM(s) Oral daily        	  LABS:	 	                        13.2   5.9   )-----------( 393      ( 24 Sep 2018 08:11 )             42.7     09-24    137  |  100  |  23<H>  ----------------------------<  97  3.5   |  26  |  1.17    Ca    8.9      24 Sep 2018 08:11  Phos  4.9     09-24  Mg     2.3     09-24      proBNP: Serum Pro-Brain Natriuretic Peptide: 3404 pg/mL (09-21 @ 21:45)    Lipid Profile: Cholesterol 214    HDL 47      HgA1c: Hemoglobin A1C, Whole Blood: 4.9 % (09-22 @ 09:06)    TSH: Thyroid Stimulating Hormone, Serum: 2.28 uU/mL (09-22 @ 03:36)

## 2018-09-26 VITALS
OXYGEN SATURATION: 99 % | HEART RATE: 78 BPM | RESPIRATION RATE: 18 BRPM | DIASTOLIC BLOOD PRESSURE: 69 MMHG | TEMPERATURE: 98 F | SYSTOLIC BLOOD PRESSURE: 99 MMHG

## 2018-09-26 LAB
ANION GAP SERPL CALC-SCNC: 8 MMOL/L — SIGNIFICANT CHANGE UP (ref 5–17)
BASOPHILS # BLD AUTO: 0.1 K/UL — SIGNIFICANT CHANGE UP (ref 0–0.2)
BASOPHILS NFR BLD AUTO: 1.1 % — SIGNIFICANT CHANGE UP (ref 0–2)
BUN SERPL-MCNC: 32 MG/DL — HIGH (ref 7–18)
CALCIUM SERPL-MCNC: 8.8 MG/DL — SIGNIFICANT CHANGE UP (ref 8.4–10.5)
CHLORIDE SERPL-SCNC: 104 MMOL/L — SIGNIFICANT CHANGE UP (ref 96–108)
CO2 SERPL-SCNC: 25 MMOL/L — SIGNIFICANT CHANGE UP (ref 22–31)
CREAT SERPL-MCNC: 1.14 MG/DL — SIGNIFICANT CHANGE UP (ref 0.5–1.3)
EOSINOPHIL # BLD AUTO: 0.3 K/UL — SIGNIFICANT CHANGE UP (ref 0–0.5)
EOSINOPHIL NFR BLD AUTO: 4.4 % — SIGNIFICANT CHANGE UP (ref 0–6)
GLUCOSE SERPL-MCNC: 102 MG/DL — HIGH (ref 70–99)
HCT VFR BLD CALC: 42.2 % — SIGNIFICANT CHANGE UP (ref 34.5–45)
HGB BLD-MCNC: 13.2 G/DL — SIGNIFICANT CHANGE UP (ref 11.5–15.5)
LYMPHOCYTES # BLD AUTO: 1.3 K/UL — SIGNIFICANT CHANGE UP (ref 1–3.3)
LYMPHOCYTES # BLD AUTO: 17.8 % — SIGNIFICANT CHANGE UP (ref 13–44)
MAGNESIUM SERPL-MCNC: 2.7 MG/DL — HIGH (ref 1.6–2.6)
MCHC RBC-ENTMCNC: 31.2 GM/DL — LOW (ref 32–36)
MCHC RBC-ENTMCNC: 32.9 PG — SIGNIFICANT CHANGE UP (ref 27–34)
MCV RBC AUTO: 105.4 FL — HIGH (ref 80–100)
MONOCYTES # BLD AUTO: 0.7 K/UL — SIGNIFICANT CHANGE UP (ref 0–0.9)
MONOCYTES NFR BLD AUTO: 9.7 % — SIGNIFICANT CHANGE UP (ref 2–14)
NEUTROPHILS # BLD AUTO: 5 K/UL — SIGNIFICANT CHANGE UP (ref 1.8–7.4)
NEUTROPHILS NFR BLD AUTO: 67 % — SIGNIFICANT CHANGE UP (ref 43–77)
PHOSPHATE SERPL-MCNC: 4.6 MG/DL — HIGH (ref 2.5–4.5)
PLATELET # BLD AUTO: 452 K/UL — HIGH (ref 150–400)
POTASSIUM SERPL-MCNC: 4.3 MMOL/L — SIGNIFICANT CHANGE UP (ref 3.5–5.3)
POTASSIUM SERPL-SCNC: 4.3 MMOL/L — SIGNIFICANT CHANGE UP (ref 3.5–5.3)
RBC # BLD: 4 M/UL — SIGNIFICANT CHANGE UP (ref 3.8–5.2)
RBC # FLD: 14.1 % — SIGNIFICANT CHANGE UP (ref 10.3–14.5)
SODIUM SERPL-SCNC: 137 MMOL/L — SIGNIFICANT CHANGE UP (ref 135–145)
WBC # BLD: 7.5 K/UL — SIGNIFICANT CHANGE UP (ref 3.8–10.5)
WBC # FLD AUTO: 7.5 K/UL — SIGNIFICANT CHANGE UP (ref 3.8–10.5)

## 2018-09-26 PROCEDURE — 84484 ASSAY OF TROPONIN QUANT: CPT

## 2018-09-26 PROCEDURE — 80048 BASIC METABOLIC PNL TOTAL CA: CPT

## 2018-09-26 PROCEDURE — 84100 ASSAY OF PHOSPHORUS: CPT

## 2018-09-26 PROCEDURE — 93005 ELECTROCARDIOGRAM TRACING: CPT

## 2018-09-26 PROCEDURE — 80061 LIPID PANEL: CPT

## 2018-09-26 PROCEDURE — 99285 EMERGENCY DEPT VISIT HI MDM: CPT | Mod: 25

## 2018-09-26 PROCEDURE — 84702 CHORIONIC GONADOTROPIN TEST: CPT

## 2018-09-26 PROCEDURE — 85027 COMPLETE CBC AUTOMATED: CPT

## 2018-09-26 PROCEDURE — A9502: CPT

## 2018-09-26 PROCEDURE — 83735 ASSAY OF MAGNESIUM: CPT

## 2018-09-26 PROCEDURE — 82553 CREATINE MB FRACTION: CPT

## 2018-09-26 PROCEDURE — 80053 COMPREHEN METABOLIC PANEL: CPT

## 2018-09-26 PROCEDURE — 78452 HT MUSCLE IMAGE SPECT MULT: CPT

## 2018-09-26 PROCEDURE — 93306 TTE W/DOPPLER COMPLETE: CPT

## 2018-09-26 PROCEDURE — 71275 CT ANGIOGRAPHY CHEST: CPT

## 2018-09-26 PROCEDURE — 85610 PROTHROMBIN TIME: CPT

## 2018-09-26 PROCEDURE — 94640 AIRWAY INHALATION TREATMENT: CPT

## 2018-09-26 PROCEDURE — 83036 HEMOGLOBIN GLYCOSYLATED A1C: CPT

## 2018-09-26 PROCEDURE — 71045 X-RAY EXAM CHEST 1 VIEW: CPT

## 2018-09-26 PROCEDURE — 84443 ASSAY THYROID STIM HORMONE: CPT

## 2018-09-26 PROCEDURE — 83880 ASSAY OF NATRIURETIC PEPTIDE: CPT

## 2018-09-26 PROCEDURE — 85379 FIBRIN DEGRADATION QUANT: CPT

## 2018-09-26 PROCEDURE — 93017 CV STRESS TEST TRACING ONLY: CPT

## 2018-09-26 PROCEDURE — 82550 ASSAY OF CK (CPK): CPT

## 2018-09-26 RX ORDER — METOPROLOL TARTRATE 50 MG
1 TABLET ORAL
Qty: 60 | Refills: 0
Start: 2018-09-26 | End: 2018-10-25

## 2018-09-26 RX ORDER — ASPIRIN/CALCIUM CARB/MAGNESIUM 324 MG
1 TABLET ORAL
Qty: 30 | Refills: 0
Start: 2018-09-26 | End: 2018-10-25

## 2018-09-26 RX ORDER — FUROSEMIDE 40 MG
1 TABLET ORAL
Qty: 14 | Refills: 0
Start: 2018-09-26 | End: 2018-10-09

## 2018-09-26 RX ORDER — ATORVASTATIN CALCIUM 80 MG/1
1 TABLET, FILM COATED ORAL
Qty: 30 | Refills: 0
Start: 2018-09-26 | End: 2018-10-25

## 2018-09-26 RX ORDER — SPIRONOLACTONE 25 MG/1
1 TABLET, FILM COATED ORAL
Qty: 30 | Refills: 0
Start: 2018-09-26 | End: 2018-10-25

## 2018-09-26 RX ORDER — LISINOPRIL 2.5 MG/1
1 TABLET ORAL
Qty: 60 | Refills: 0
Start: 2018-09-26 | End: 2018-10-25

## 2018-09-26 RX ORDER — FUROSEMIDE 40 MG
1 TABLET ORAL
Qty: 14 | Refills: 0 | OUTPATIENT
Start: 2018-09-26 | End: 2018-10-09

## 2018-09-26 RX ADMIN — Medication 20 MILLIGRAM(S): at 06:00

## 2018-09-26 RX ADMIN — Medication 81 MILLIGRAM(S): at 13:17

## 2018-09-26 RX ADMIN — LISINOPRIL 5 MILLIGRAM(S): 2.5 TABLET ORAL at 06:00

## 2018-09-26 RX ADMIN — Medication 25 MILLIGRAM(S): at 05:59

## 2018-09-26 RX ADMIN — SPIRONOLACTONE 25 MILLIGRAM(S): 25 TABLET, FILM COATED ORAL at 06:00

## 2018-09-26 NOTE — PROGRESS NOTE ADULT - SUBJECTIVE AND OBJECTIVE BOX
CHIEF COMPLAINT:Patient is a 43y old  Female who presents with a chief complaint of SOB.Pt appears comfortable.    	  REVIEW OF SYSTEMS:  CONSTITUTIONAL: No fever, weight loss, or fatigue  EYES: No eye pain, visual disturbances, or discharge  ENT:  No difficulty hearing, tinnitus, vertigo; No sinus or throat pain  NECK: No pain or stiffness  RESPIRATORY: No cough, wheezing, chills or hemoptysis; No Shortness of Breath  CARDIOVASCULAR: No chest pain, palpitations, passing out, dizziness, or leg swelling  GASTROINTESTINAL: No abdominal or epigastric pain. No nausea, vomiting, or hematemesis; No diarrhea or constipation. No melena or hematochezia.  GENITOURINARY: No dysuria, frequency, hematuria, or incontinence  NEUROLOGICAL: No headaches, memory loss, loss of strength, numbness, or tremors  SKIN: No itching, burning, rashes, or lesions   LYMPH Nodes: No enlarged glands  ENDOCRINE: No heat or cold intolerance; No hair loss  MUSCULOSKELETAL: No joint pain or swelling; No muscle, back, or extremity pain  PSYCHIATRIC: No depression, anxiety, mood swings, or difficulty sleeping  HEME/LYMPH: No easy bruising, or bleeding gums  ALLERGY AND IMMUNOLOGIC: No hives or eczema	      PHYSICAL EXAM:  T(C): 36.7 (09-26-18 @ 07:47), Max: 37 (09-25-18 @ 15:42)  HR: 73 (09-26-18 @ 07:47) (73 - 86)  BP: 124/76 (09-26-18 @ 07:47) (111/67 - 128/88)  RR: 18 (09-26-18 @ 07:47) (17 - 18)  SpO2: 99% (09-26-18 @ 07:47) (93% - 100%)  Wt(kg): --  I&O's Summary    25 Sep 2018 07:01  -  26 Sep 2018 07:00  --------------------------------------------------------  IN: 300 mL / OUT: 0 mL / NET: 300 mL        Appearance: Normal	  HEENT:   Normal oral mucosa, PERRL, EOMI	  Lymphatic: No lymphadenopathy  Cardiovascular: Normal S1 S2, No JVD, No murmurs, No edema  Respiratory: Lungs clear to auscultation	  Psychiatry: A & O x 3, Mood & affect appropriate  Gastrointestinal:  Soft, Non-tender, + BS	  Skin: No rashes, No ecchymoses, No cyanosis	  Neurologic: Non-focal  Extremities: Normal range of motion, No clubbing, cyanosis or edema  Vascular: Peripheral pulses palpable 2+ bilaterally    MEDICATIONS  (STANDING):  aspirin  chewable 81 milliGRAM(s) Oral daily  atorvastatin 40 milliGRAM(s) Oral at bedtime  furosemide    Tablet 20 milliGRAM(s) Oral daily  lisinopril 5 milliGRAM(s) Oral two times a day  metoprolol tartrate 25 milliGRAM(s) Oral two times a day  spironolactone 25 milliGRAM(s) Oral daily      TELEMETRY: 	  nsr,sinus tach    	  LABS:	 	                        13.2   7.5   )-----------( 452      ( 26 Sep 2018 05:45 )             42.2     09-26    137  |  104  |  32<H>  ----------------------------<  102<H>  4.3   |  25  |  1.14    Ca    8.8      26 Sep 2018 05:45  Phos  4.6     09-26  Mg     2.7     09-26      proBNP: Serum Pro-Brain Natriuretic Peptide: 3404 pg/mL (09-21 @ 21:45)    Lipid Profile: Cholesterol 214    HDL 47      HgA1c: Hemoglobin A1C, Whole Blood: 4.9 % (09-22 @ 09:06)    TSH: Thyroid Stimulating Hormone, Serum: 2.28 uU/mL (09-22 @ 03:36)      IMPRESSIONS:Abnormal Study  * Negative ECG evidence of ischemia after IV of lexiscan.  * Review of raw data shows: The study is of good technical  quality.  * The left ventricle was normal in size. Normal myocardial  perfusion scan, with no evidence of infarction or  inducible ischemia.  * Global hypokinesis with post-stress resting myocardial  perfusion gated SPECT imaging was performed (LVEF = 31 %).

## 2018-09-26 NOTE — PROGRESS NOTE ADULT - ASSESSMENT
42 y/o Female pt with a PMHx of Asthma on Albuterol inhaler,obesity  and HTN( Non complaint with her medications) H and no significant PSHx presents to ED c/o several days of gradual onset, gradually worsening SOB, GUILLERMO, and orthopnea.  1.Tele monitoring.  2.CHF-cont lasix 20mg po qd , aldactone 25mg qd,b blocker,ace.  3.HTN-cont b blocker, ace.  4.Sleep study-r/o alexei.  5.Weight loss.  6.Non-ischemic CM, will go home with LIFE VEST and MUGA in 3 mo for EF after optimum medical treatment.  7.GI and DVT prophylaxis.  8.D/W pt need for compliance with medication. 42 y/o Female pt with a PMHx of Asthma on Albuterol inhaler,obesity  and HTN( Non complaint with her medications) H and no significant PSHx presents to ED with acute systolic HF.  1.Tele monitoring.  2.CHF-cont lasix 20mg po qd , aldactone 25mg qd,b blocker,ace.  3.HTN-cont b blocker, ace.  4.Sleep study-r/o alexei.  5.Weight loss.  6.Non-ischemic CM, will go home with LIFE VEST and MUGA in 3 mo for EF after optimum medical treatment.  7.GI and DVT prophylaxis.  8.D/W pt need for compliance with medication.

## 2020-02-05 ENCOUNTER — EMERGENCY (EMERGENCY)
Facility: HOSPITAL | Age: 45
LOS: 1 days | Discharge: HOME | End: 2020-02-05
Attending: EMERGENCY MEDICINE
Payer: MEDICAID

## 2020-02-05 VITALS
WEIGHT: 261.91 LBS | TEMPERATURE: 98 F | RESPIRATION RATE: 16 BRPM | OXYGEN SATURATION: 100 % | HEIGHT: 67 IN | HEART RATE: 120 BPM | SYSTOLIC BLOOD PRESSURE: 200 MMHG | DIASTOLIC BLOOD PRESSURE: 133 MMHG

## 2020-02-05 VITALS
RESPIRATION RATE: 19 BRPM | HEART RATE: 88 BPM | OXYGEN SATURATION: 98 % | SYSTOLIC BLOOD PRESSURE: 167 MMHG | TEMPERATURE: 99 F | DIASTOLIC BLOOD PRESSURE: 97 MMHG

## 2020-02-05 PROBLEM — Z91.19 PATIENT'S NONCOMPLIANCE WITH OTHER MEDICAL TREATMENT AND REGIMEN: Chronic | Status: ACTIVE | Noted: 2018-09-22

## 2020-02-05 LAB
ALBUMIN SERPL ELPH-MCNC: 3.2 G/DL — LOW (ref 3.5–5)
ALBUMIN SERPL ELPH-MCNC: SIGNIFICANT CHANGE UP G/DL (ref 3.5–5)
ALP SERPL-CCNC: 104 U/L — SIGNIFICANT CHANGE UP (ref 40–120)
ALP SERPL-CCNC: 35 U/L — LOW (ref 40–120)
ALT FLD-CCNC: 17 U/L DA — SIGNIFICANT CHANGE UP (ref 10–60)
ALT FLD-CCNC: 18 U/L DA — SIGNIFICANT CHANGE UP (ref 10–60)
ANION GAP SERPL CALC-SCNC: 8 MMOL/L — SIGNIFICANT CHANGE UP (ref 5–17)
AST SERPL-CCNC: 10 U/L — SIGNIFICANT CHANGE UP (ref 10–40)
AST SERPL-CCNC: 12 U/L — SIGNIFICANT CHANGE UP (ref 10–40)
BASOPHILS # BLD AUTO: 0.03 K/UL — SIGNIFICANT CHANGE UP (ref 0–0.2)
BASOPHILS NFR BLD AUTO: 0.3 % — SIGNIFICANT CHANGE UP (ref 0–2)
BILIRUB SERPL-MCNC: 0.5 MG/DL — SIGNIFICANT CHANGE UP (ref 0.2–1.2)
BILIRUB SERPL-MCNC: 0.5 MG/DL — SIGNIFICANT CHANGE UP (ref 0.2–1.2)
BUN SERPL-MCNC: 16 MG/DL — SIGNIFICANT CHANGE UP (ref 7–18)
BUN SERPL-MCNC: SIGNIFICANT CHANGE UP MG/DL (ref 7–18)
CALCIUM SERPL-MCNC: 9 MG/DL — SIGNIFICANT CHANGE UP (ref 8.4–10.5)
CALCIUM SERPL-MCNC: SIGNIFICANT CHANGE UP MG/DL (ref 8.4–10.5)
CHLORIDE SERPL-SCNC: 105 MMOL/L — SIGNIFICANT CHANGE UP (ref 96–108)
CHLORIDE SERPL-SCNC: SIGNIFICANT CHANGE UP MMOL/L (ref 96–108)
CO2 SERPL-SCNC: 27 MMOL/L — SIGNIFICANT CHANGE UP (ref 22–31)
CO2 SERPL-SCNC: SIGNIFICANT CHANGE UP MMOL/L (ref 22–31)
CREAT SERPL-MCNC: 1 MG/DL — SIGNIFICANT CHANGE UP (ref 0.5–1.3)
CREAT SERPL-MCNC: 1.04 MG/DL — SIGNIFICANT CHANGE UP (ref 0.5–1.3)
EOSINOPHIL # BLD AUTO: 0.45 K/UL — SIGNIFICANT CHANGE UP (ref 0–0.5)
EOSINOPHIL NFR BLD AUTO: 4.3 % — SIGNIFICANT CHANGE UP (ref 0–6)
GLUCOSE SERPL-MCNC: 122 MG/DL — HIGH (ref 70–99)
GLUCOSE SERPL-MCNC: SIGNIFICANT CHANGE UP MG/DL (ref 70–99)
HCG UR QL: NEGATIVE — SIGNIFICANT CHANGE UP
HCT VFR BLD CALC: 37.1 % — SIGNIFICANT CHANGE UP (ref 34.5–45)
HGB BLD-MCNC: 11.9 G/DL — SIGNIFICANT CHANGE UP (ref 11.5–15.5)
IMM GRANULOCYTES NFR BLD AUTO: 0.7 % — SIGNIFICANT CHANGE UP (ref 0–1.5)
LACTATE SERPL-SCNC: 0.9 MMOL/L — SIGNIFICANT CHANGE UP (ref 0.7–2)
LYMPHOCYTES # BLD AUTO: 1.68 K/UL — SIGNIFICANT CHANGE UP (ref 1–3.3)
LYMPHOCYTES # BLD AUTO: 16.2 % — SIGNIFICANT CHANGE UP (ref 13–44)
MCHC RBC-ENTMCNC: 31.7 PG — SIGNIFICANT CHANGE UP (ref 27–34)
MCHC RBC-ENTMCNC: 32.1 GM/DL — SIGNIFICANT CHANGE UP (ref 32–36)
MCV RBC AUTO: 98.9 FL — SIGNIFICANT CHANGE UP (ref 80–100)
MONOCYTES # BLD AUTO: 0.74 K/UL — SIGNIFICANT CHANGE UP (ref 0–0.9)
MONOCYTES NFR BLD AUTO: 7.1 % — SIGNIFICANT CHANGE UP (ref 2–14)
NEUTROPHILS # BLD AUTO: 7.42 K/UL — HIGH (ref 1.8–7.4)
NEUTROPHILS NFR BLD AUTO: 71.4 % — SIGNIFICANT CHANGE UP (ref 43–77)
NRBC # BLD: 0 /100 WBCS — SIGNIFICANT CHANGE UP (ref 0–0)
NT-PROBNP SERPL-SCNC: SIGNIFICANT CHANGE UP PG/ML (ref 0–125)
PLATELET # BLD AUTO: 358 K/UL — SIGNIFICANT CHANGE UP (ref 150–400)
POTASSIUM SERPL-MCNC: 3.3 MMOL/L — LOW (ref 3.5–5.3)
POTASSIUM SERPL-MCNC: SIGNIFICANT CHANGE UP MMOL/L (ref 3.5–5.3)
POTASSIUM SERPL-SCNC: 3.3 MMOL/L — LOW (ref 3.5–5.3)
POTASSIUM SERPL-SCNC: SIGNIFICANT CHANGE UP MMOL/L (ref 3.5–5.3)
PROT SERPL-MCNC: 8.3 G/DL — SIGNIFICANT CHANGE UP (ref 6–8.3)
PROT SERPL-MCNC: 8.5 G/DL — HIGH (ref 6–8.3)
RBC # BLD: 3.75 M/UL — LOW (ref 3.8–5.2)
RBC # FLD: 14.4 % — SIGNIFICANT CHANGE UP (ref 10.3–14.5)
SODIUM SERPL-SCNC: 140 MMOL/L — SIGNIFICANT CHANGE UP (ref 135–145)
SODIUM SERPL-SCNC: SIGNIFICANT CHANGE UP MMOL/L (ref 135–145)
TROPONIN I SERPL-MCNC: <0.015 NG/ML — SIGNIFICANT CHANGE UP (ref 0–0.04)
WBC # BLD: 10.39 K/UL — SIGNIFICANT CHANGE UP (ref 3.8–10.5)
WBC # FLD AUTO: 10.39 K/UL — SIGNIFICANT CHANGE UP (ref 3.8–10.5)

## 2020-02-05 PROCEDURE — 83605 ASSAY OF LACTIC ACID: CPT

## 2020-02-05 PROCEDURE — 96374 THER/PROPH/DIAG INJ IV PUSH: CPT

## 2020-02-05 PROCEDURE — 81025 URINE PREGNANCY TEST: CPT

## 2020-02-05 PROCEDURE — 99291 CRITICAL CARE FIRST HOUR: CPT

## 2020-02-05 PROCEDURE — 93971 EXTREMITY STUDY: CPT | Mod: 26,RT

## 2020-02-05 PROCEDURE — 83880 ASSAY OF NATRIURETIC PEPTIDE: CPT

## 2020-02-05 PROCEDURE — 71046 X-RAY EXAM CHEST 2 VIEWS: CPT

## 2020-02-05 PROCEDURE — 80053 COMPREHEN METABOLIC PANEL: CPT

## 2020-02-05 PROCEDURE — 93005 ELECTROCARDIOGRAM TRACING: CPT

## 2020-02-05 PROCEDURE — 36415 COLL VENOUS BLD VENIPUNCTURE: CPT

## 2020-02-05 PROCEDURE — 71046 X-RAY EXAM CHEST 2 VIEWS: CPT | Mod: 26

## 2020-02-05 PROCEDURE — 96375 TX/PRO/DX INJ NEW DRUG ADDON: CPT

## 2020-02-05 PROCEDURE — 85027 COMPLETE CBC AUTOMATED: CPT

## 2020-02-05 PROCEDURE — 93971 EXTREMITY STUDY: CPT

## 2020-02-05 PROCEDURE — 84484 ASSAY OF TROPONIN QUANT: CPT

## 2020-02-05 RX ORDER — LABETALOL HCL 100 MG
20 TABLET ORAL ONCE
Refills: 0 | Status: COMPLETED | OUTPATIENT
Start: 2020-02-05 | End: 2020-02-05

## 2020-02-05 RX ORDER — LISINOPRIL 2.5 MG/1
5 TABLET ORAL ONCE
Refills: 0 | Status: COMPLETED | OUTPATIENT
Start: 2020-02-05 | End: 2020-02-05

## 2020-02-05 RX ORDER — CARVEDILOL PHOSPHATE 80 MG/1
3.12 CAPSULE, EXTENDED RELEASE ORAL ONCE
Refills: 0 | Status: COMPLETED | OUTPATIENT
Start: 2020-02-05 | End: 2020-02-05

## 2020-02-05 RX ORDER — FUROSEMIDE 40 MG
40 TABLET ORAL ONCE
Refills: 0 | Status: COMPLETED | OUTPATIENT
Start: 2020-02-05 | End: 2020-02-05

## 2020-02-05 RX ADMIN — Medication 20 MILLIGRAM(S): at 08:03

## 2020-02-05 RX ADMIN — CARVEDILOL PHOSPHATE 3.12 MILLIGRAM(S): 80 CAPSULE, EXTENDED RELEASE ORAL at 08:02

## 2020-02-05 RX ADMIN — LISINOPRIL 5 MILLIGRAM(S): 2.5 TABLET ORAL at 08:02

## 2020-02-05 RX ADMIN — Medication 40 MILLIGRAM(S): at 08:02

## 2020-02-05 NOTE — ED PROVIDER NOTE - CRITICAL CARE INDICATION, MLM
patient was critically ill... Patient was critically ill with a high probability of imminent or life threatening deterioration. require IV labetalol to control BP due to noncompliance.

## 2020-02-05 NOTE — ED PROVIDER NOTE - PATIENT PORTAL LINK FT
You can access the FollowMyHealth Patient Portal offered by [facility] by registering at the following website: [email or web address]. By joining [employer]’s FollowMyHealth portal, you will also be able to view your health information using other applications (apps) compatible with our system.

## 2020-02-05 NOTE — ED PROVIDER NOTE - CLINICAL SUMMARY MEDICAL DECISION MAKING FREE TEXT BOX
44 yr old female with hx of CHF, HTn noncompliant on aldactone, lasix, lisinopril, coreg and atorvastatin presents to ed c/o RLE swelling, redness and itchy x couple days.  no fever, no chills, no visual changes, no headache, no numbness or tingling, no sob, no cough, no cp, no palpitations, no syncope, no abd pain, no n/v/d, no dysuria.  pt states noticed like a small blister and was itchy then pop.  pt denies any trauma.    non-compliant on BP meds likely venous stasis vs HTN emergency r/o dvt.  pulses equal, cap refill < 2 sec, no sign of cellulitis or crepitus.  exam or hx not consistent with infectious or neurovascular compromise

## 2020-02-05 NOTE — ED PROVIDER NOTE - OBJECTIVE STATEMENT
44 yr old female with hx of CHF, HTn noncompliant on aldactone, lasix, lisinopril, coreg and atorvastatin presents to ed c/o RLE swelling, redness and itchy x couple days.  no fever, no chills, no visual changes, no headache, no numbness or tingling, no sob, no cough, no cp, no palpitations, no syncope, no abd pain, no n/v/d, no dysuria.  pt states noticed like a small blister and was itchy then pop.  pt denies any trauma. NO PAIN

## 2020-02-05 NOTE — ED PROVIDER NOTE - PROGRESS NOTE DETAILS
Lees: neurologically intact.  dvt study neg.  no eng organ damage. bp improve should not lower more since unknown baseline. pt states will see cardiologist today  dx HTN due to non-compliance without end organ damage.  venous stasis. see cardio and vasculaR MD. elevate legs, bacitracin to area, monitor sx. left ambulatory.  return precautions given.

## 2020-02-05 NOTE — ED PROVIDER NOTE - PHYSICAL EXAMINATION
RLE- brawny appearing, 2 anterior upper shin ulcer with granulation, no purulent discharge, normal skin temp, no erythema

## 2022-08-17 NOTE — ED PROVIDER NOTE - OBJECTIVE STATEMENT
42 y/o F pt with a PMHx of Mild intermittent asthma, HTN, and Noncompliance with HTN medication for years and no significant PSHx presents to ED c/o several days of gradual onset, gradually worsening SOB, GUILLERMO, and orthopnea. Pt denies sx being similar to prior asthma, recent travel, trauma, surgery, hypercoagulation in self or family, cancer Hx, lower extremity edema, hemoptysis, CP, cough, exertional CP, or any other complaints. Allergies: Penicillin (Unknown). 2 = A lot of assistance

## 2023-05-03 ENCOUNTER — INPATIENT (INPATIENT)
Facility: HOSPITAL | Age: 48
LOS: 4 days | Discharge: ANOTHER HOSPITAL | DRG: 305 | End: 2023-05-08
Attending: INTERNAL MEDICINE | Admitting: INTERNAL MEDICINE
Payer: MEDICAID

## 2023-05-03 VITALS
TEMPERATURE: 98 F | SYSTOLIC BLOOD PRESSURE: 162 MMHG | DIASTOLIC BLOOD PRESSURE: 129 MMHG | RESPIRATION RATE: 22 BRPM | OXYGEN SATURATION: 97 % | WEIGHT: 220.02 LBS | HEART RATE: 173 BPM

## 2023-05-03 DIAGNOSIS — I48.91 UNSPECIFIED ATRIAL FIBRILLATION: ICD-10-CM

## 2023-05-03 DIAGNOSIS — N17.9 ACUTE KIDNEY FAILURE, UNSPECIFIED: ICD-10-CM

## 2023-05-03 DIAGNOSIS — R74.01 ELEVATION OF LEVELS OF LIVER TRANSAMINASE LEVELS: ICD-10-CM

## 2023-05-03 DIAGNOSIS — Z29.9 ENCOUNTER FOR PROPHYLACTIC MEASURES, UNSPECIFIED: ICD-10-CM

## 2023-05-03 DIAGNOSIS — I10 ESSENTIAL (PRIMARY) HYPERTENSION: ICD-10-CM

## 2023-05-03 DIAGNOSIS — J45.909 UNSPECIFIED ASTHMA, UNCOMPLICATED: ICD-10-CM

## 2023-05-03 DIAGNOSIS — R77.8 OTHER SPECIFIED ABNORMALITIES OF PLASMA PROTEINS: ICD-10-CM

## 2023-05-03 LAB
ALBUMIN SERPL ELPH-MCNC: 3.4 G/DL — LOW (ref 3.5–5)
ALP SERPL-CCNC: 100 U/L — SIGNIFICANT CHANGE UP (ref 40–120)
ALT FLD-CCNC: 135 U/L DA — HIGH (ref 10–60)
ANION GAP SERPL CALC-SCNC: 11 MMOL/L — SIGNIFICANT CHANGE UP (ref 5–17)
APTT BLD: 26.2 SEC — LOW (ref 27.5–35.5)
AST SERPL-CCNC: 218 U/L — HIGH (ref 10–40)
BASOPHILS # BLD AUTO: 0.06 K/UL — SIGNIFICANT CHANGE UP (ref 0–0.2)
BASOPHILS NFR BLD AUTO: 0.7 % — SIGNIFICANT CHANGE UP (ref 0–2)
BILIRUB SERPL-MCNC: 1.6 MG/DL — HIGH (ref 0.2–1.2)
BUN SERPL-MCNC: 36 MG/DL — HIGH (ref 7–18)
CALCIUM SERPL-MCNC: 9.4 MG/DL — SIGNIFICANT CHANGE UP (ref 8.4–10.5)
CHLORIDE SERPL-SCNC: 108 MMOL/L — SIGNIFICANT CHANGE UP (ref 96–108)
CK MB BLD-MCNC: 7.9 % — HIGH (ref 0–3.5)
CK MB CFR SERPL CALC: 48.3 NG/ML — HIGH (ref 0–3.6)
CK SERPL-CCNC: 611 U/L — HIGH (ref 21–215)
CO2 SERPL-SCNC: 19 MMOL/L — LOW (ref 22–31)
CREAT SERPL-MCNC: 1.7 MG/DL — HIGH (ref 0.5–1.3)
D DIMER BLD IA.RAPID-MCNC: 699 NG/ML DDU — HIGH
EGFR: 37 ML/MIN/1.73M2 — LOW
EOSINOPHIL # BLD AUTO: 0.02 K/UL — SIGNIFICANT CHANGE UP (ref 0–0.5)
EOSINOPHIL NFR BLD AUTO: 0.2 % — SIGNIFICANT CHANGE UP (ref 0–6)
GLUCOSE SERPL-MCNC: 135 MG/DL — HIGH (ref 70–99)
HCG SERPL-ACNC: <1 MIU/ML — SIGNIFICANT CHANGE UP
HCT VFR BLD CALC: 39.2 % — SIGNIFICANT CHANGE UP (ref 34.5–45)
HGB BLD-MCNC: 12.7 G/DL — SIGNIFICANT CHANGE UP (ref 11.5–15.5)
IMM GRANULOCYTES NFR BLD AUTO: 0.2 % — SIGNIFICANT CHANGE UP (ref 0–0.9)
INR BLD: 1.37 RATIO — HIGH (ref 0.88–1.16)
LYMPHOCYTES # BLD AUTO: 1.54 K/UL — SIGNIFICANT CHANGE UP (ref 1–3.3)
LYMPHOCYTES # BLD AUTO: 19.1 % — SIGNIFICANT CHANGE UP (ref 13–44)
MAGNESIUM SERPL-MCNC: 2.2 MG/DL — SIGNIFICANT CHANGE UP (ref 1.6–2.6)
MCHC RBC-ENTMCNC: 32.4 GM/DL — SIGNIFICANT CHANGE UP (ref 32–36)
MCHC RBC-ENTMCNC: 33.3 PG — SIGNIFICANT CHANGE UP (ref 27–34)
MCV RBC AUTO: 102.9 FL — HIGH (ref 80–100)
MONOCYTES # BLD AUTO: 0.61 K/UL — SIGNIFICANT CHANGE UP (ref 0–0.9)
MONOCYTES NFR BLD AUTO: 7.6 % — SIGNIFICANT CHANGE UP (ref 2–14)
NEUTROPHILS # BLD AUTO: 5.8 K/UL — SIGNIFICANT CHANGE UP (ref 1.8–7.4)
NEUTROPHILS NFR BLD AUTO: 72.2 % — SIGNIFICANT CHANGE UP (ref 43–77)
NRBC # BLD: 0 /100 WBCS — SIGNIFICANT CHANGE UP (ref 0–0)
PLATELET # BLD AUTO: 315 K/UL — SIGNIFICANT CHANGE UP (ref 150–400)
POTASSIUM SERPL-MCNC: 4.1 MMOL/L — SIGNIFICANT CHANGE UP (ref 3.5–5.3)
POTASSIUM SERPL-SCNC: 4.1 MMOL/L — SIGNIFICANT CHANGE UP (ref 3.5–5.3)
PROT SERPL-MCNC: 7.5 G/DL — SIGNIFICANT CHANGE UP (ref 6–8.3)
PROTHROM AB SERPL-ACNC: 16.4 SEC — HIGH (ref 10.5–13.4)
RBC # BLD: 3.81 M/UL — SIGNIFICANT CHANGE UP (ref 3.8–5.2)
RBC # FLD: 14.8 % — HIGH (ref 10.3–14.5)
SARS-COV-2 RNA SPEC QL NAA+PROBE: SIGNIFICANT CHANGE UP
SODIUM SERPL-SCNC: 138 MMOL/L — SIGNIFICANT CHANGE UP (ref 135–145)
TROPONIN I, HIGH SENSITIVITY RESULT: HIGH NG/L
TROPONIN I, HIGH SENSITIVITY RESULT: HIGH NG/L
TSH SERPL-MCNC: 1.51 UU/ML — SIGNIFICANT CHANGE UP (ref 0.34–4.82)
WBC # BLD: 8.05 K/UL — SIGNIFICANT CHANGE UP (ref 3.8–10.5)
WBC # FLD AUTO: 8.05 K/UL — SIGNIFICANT CHANGE UP (ref 3.8–10.5)

## 2023-05-03 PROCEDURE — 99285 EMERGENCY DEPT VISIT HI MDM: CPT

## 2023-05-03 PROCEDURE — 71275 CT ANGIOGRAPHY CHEST: CPT | Mod: 26

## 2023-05-03 PROCEDURE — 71045 X-RAY EXAM CHEST 1 VIEW: CPT | Mod: 26

## 2023-05-03 RX ORDER — ONDANSETRON 8 MG/1
4 TABLET, FILM COATED ORAL ONCE
Refills: 0 | Status: COMPLETED | OUTPATIENT
Start: 2023-05-03 | End: 2023-05-03

## 2023-05-03 RX ORDER — DILTIAZEM HCL 120 MG
5 CAPSULE, EXT RELEASE 24 HR ORAL
Qty: 125 | Refills: 0 | Status: DISCONTINUED | OUTPATIENT
Start: 2023-05-03 | End: 2023-05-03

## 2023-05-03 RX ORDER — HEPARIN SODIUM 5000 [USP'U]/ML
4000 INJECTION INTRAVENOUS; SUBCUTANEOUS EVERY 6 HOURS
Refills: 0 | Status: DISCONTINUED | OUTPATIENT
Start: 2023-05-03 | End: 2023-05-03

## 2023-05-03 RX ORDER — SODIUM CHLORIDE 9 MG/ML
1000 INJECTION INTRAMUSCULAR; INTRAVENOUS; SUBCUTANEOUS ONCE
Refills: 0 | Status: COMPLETED | OUTPATIENT
Start: 2023-05-03 | End: 2023-05-03

## 2023-05-03 RX ORDER — CARVEDILOL PHOSPHATE 80 MG/1
12.5 CAPSULE, EXTENDED RELEASE ORAL EVERY 12 HOURS
Refills: 0 | Status: DISCONTINUED | OUTPATIENT
Start: 2023-05-03 | End: 2023-05-05

## 2023-05-03 RX ORDER — ALBUTEROL 90 UG/1
2 AEROSOL, METERED ORAL EVERY 6 HOURS
Refills: 0 | Status: DISCONTINUED | OUTPATIENT
Start: 2023-05-03 | End: 2023-05-08

## 2023-05-03 RX ORDER — ASPIRIN/CALCIUM CARB/MAGNESIUM 324 MG
81 TABLET ORAL DAILY
Refills: 0 | Status: DISCONTINUED | OUTPATIENT
Start: 2023-05-03 | End: 2023-05-08

## 2023-05-03 RX ORDER — LANOLIN ALCOHOL/MO/W.PET/CERES
3 CREAM (GRAM) TOPICAL AT BEDTIME
Refills: 0 | Status: DISCONTINUED | OUTPATIENT
Start: 2023-05-03 | End: 2023-05-08

## 2023-05-03 RX ORDER — HEPARIN SODIUM 5000 [USP'U]/ML
8000 INJECTION INTRAVENOUS; SUBCUTANEOUS EVERY 6 HOURS
Refills: 0 | Status: DISCONTINUED | OUTPATIENT
Start: 2023-05-03 | End: 2023-05-03

## 2023-05-03 RX ORDER — DIGOXIN 250 MCG
500 TABLET ORAL ONCE
Refills: 0 | Status: COMPLETED | OUTPATIENT
Start: 2023-05-03 | End: 2023-05-03

## 2023-05-03 RX ORDER — METOPROLOL TARTRATE 50 MG
5 TABLET ORAL ONCE
Refills: 0 | Status: COMPLETED | OUTPATIENT
Start: 2023-05-03 | End: 2023-05-03

## 2023-05-03 RX ORDER — HEPARIN SODIUM 5000 [USP'U]/ML
8500 INJECTION INTRAVENOUS; SUBCUTANEOUS EVERY 6 HOURS
Refills: 0 | Status: DISCONTINUED | OUTPATIENT
Start: 2023-05-03 | End: 2023-05-08

## 2023-05-03 RX ORDER — HEPARIN SODIUM 5000 [USP'U]/ML
INJECTION INTRAVENOUS; SUBCUTANEOUS
Qty: 25000 | Refills: 0 | Status: DISCONTINUED | OUTPATIENT
Start: 2023-05-03 | End: 2023-05-08

## 2023-05-03 RX ORDER — HEPARIN SODIUM 5000 [USP'U]/ML
8500 INJECTION INTRAVENOUS; SUBCUTANEOUS ONCE
Refills: 0 | Status: COMPLETED | OUTPATIENT
Start: 2023-05-03 | End: 2023-05-03

## 2023-05-03 RX ORDER — DILTIAZEM HCL 120 MG
26 CAPSULE, EXT RELEASE 24 HR ORAL ONCE
Refills: 0 | Status: COMPLETED | OUTPATIENT
Start: 2023-05-03 | End: 2023-05-03

## 2023-05-03 RX ORDER — DILTIAZEM HCL 120 MG
10 CAPSULE, EXT RELEASE 24 HR ORAL ONCE
Refills: 0 | Status: COMPLETED | OUTPATIENT
Start: 2023-05-03 | End: 2023-05-03

## 2023-05-03 RX ORDER — HEPARIN SODIUM 5000 [USP'U]/ML
8000 INJECTION INTRAVENOUS; SUBCUTANEOUS ONCE
Refills: 0 | Status: DISCONTINUED | OUTPATIENT
Start: 2023-05-03 | End: 2023-05-03

## 2023-05-03 RX ORDER — ACETAMINOPHEN 500 MG
650 TABLET ORAL EVERY 6 HOURS
Refills: 0 | Status: DISCONTINUED | OUTPATIENT
Start: 2023-05-03 | End: 2023-05-08

## 2023-05-03 RX ORDER — LOSARTAN POTASSIUM 100 MG/1
50 TABLET, FILM COATED ORAL DAILY
Refills: 0 | Status: DISCONTINUED | OUTPATIENT
Start: 2023-05-03 | End: 2023-05-04

## 2023-05-03 RX ORDER — HEPARIN SODIUM 5000 [USP'U]/ML
4000 INJECTION INTRAVENOUS; SUBCUTANEOUS EVERY 6 HOURS
Refills: 0 | Status: DISCONTINUED | OUTPATIENT
Start: 2023-05-03 | End: 2023-05-08

## 2023-05-03 RX ORDER — SPIRONOLACTONE 25 MG/1
25 TABLET, FILM COATED ORAL DAILY
Refills: 0 | Status: DISCONTINUED | OUTPATIENT
Start: 2023-05-03 | End: 2023-05-04

## 2023-05-03 RX ORDER — DILTIAZEM HCL 120 MG
10 CAPSULE, EXT RELEASE 24 HR ORAL
Qty: 125 | Refills: 0 | Status: DISCONTINUED | OUTPATIENT
Start: 2023-05-03 | End: 2023-05-04

## 2023-05-03 RX ORDER — FUROSEMIDE 40 MG
20 TABLET ORAL DAILY
Refills: 0 | Status: DISCONTINUED | OUTPATIENT
Start: 2023-05-03 | End: 2023-05-04

## 2023-05-03 RX ORDER — SODIUM CHLORIDE 9 MG/ML
1000 INJECTION, SOLUTION INTRAVENOUS
Refills: 0 | Status: DISCONTINUED | OUTPATIENT
Start: 2023-05-03 | End: 2023-05-04

## 2023-05-03 RX ORDER — HEPARIN SODIUM 5000 [USP'U]/ML
INJECTION INTRAVENOUS; SUBCUTANEOUS
Qty: 25000 | Refills: 0 | Status: DISCONTINUED | OUTPATIENT
Start: 2023-05-03 | End: 2023-05-03

## 2023-05-03 RX ADMIN — SODIUM CHLORIDE 1000 MILLILITER(S): 9 INJECTION INTRAMUSCULAR; INTRAVENOUS; SUBCUTANEOUS at 14:13

## 2023-05-03 RX ADMIN — Medication 5 MG/HR: at 18:02

## 2023-05-03 RX ADMIN — SODIUM CHLORIDE 100 MILLILITER(S): 9 INJECTION, SOLUTION INTRAVENOUS at 20:48

## 2023-05-03 RX ADMIN — Medication 10 MG/HR: at 23:17

## 2023-05-03 RX ADMIN — HEPARIN SODIUM 1800 UNIT(S)/HR: 5000 INJECTION INTRAVENOUS; SUBCUTANEOUS at 17:06

## 2023-05-03 RX ADMIN — HEPARIN SODIUM 8500 UNIT(S): 5000 INJECTION INTRAVENOUS; SUBCUTANEOUS at 17:09

## 2023-05-03 RX ADMIN — ALBUTEROL 2 PUFF(S): 90 AEROSOL, METERED ORAL at 20:03

## 2023-05-03 RX ADMIN — LOSARTAN POTASSIUM 50 MILLIGRAM(S): 100 TABLET, FILM COATED ORAL at 20:03

## 2023-05-03 RX ADMIN — Medication 26 MILLIGRAM(S): at 23:11

## 2023-05-03 RX ADMIN — Medication 500 MICROGRAM(S): at 15:32

## 2023-05-03 RX ADMIN — CARVEDILOL PHOSPHATE 12.5 MILLIGRAM(S): 80 CAPSULE, EXTENDED RELEASE ORAL at 20:03

## 2023-05-03 RX ADMIN — Medication 5 MILLIGRAM(S): at 14:26

## 2023-05-03 RX ADMIN — Medication 10 MILLIGRAM(S): at 15:33

## 2023-05-03 RX ADMIN — SPIRONOLACTONE 25 MILLIGRAM(S): 25 TABLET, FILM COATED ORAL at 20:03

## 2023-05-03 RX ADMIN — Medication 20 MILLIGRAM(S): at 20:03

## 2023-05-03 RX ADMIN — HEPARIN SODIUM 1800 UNIT(S)/HR: 5000 INJECTION INTRAVENOUS; SUBCUTANEOUS at 19:23

## 2023-05-03 RX ADMIN — Medication 5 MILLIGRAM(S): at 15:00

## 2023-05-03 RX ADMIN — Medication 81 MILLIGRAM(S): at 20:03

## 2023-05-03 NOTE — ED PROVIDER NOTE - CLINICAL SUMMARY MEDICAL DECISION MAKING FREE TEXT BOX
47-year-old female hx of HTN, HLD, presenting with 1 week of nausea, fatigue and weakness, with progressive shortness of breath - found to be in new onset AFib. Labs with multiple abnormalities including Trop 54223.8, cr 1.7, Ddimer 699. CTPE ordered. Given metop IV x2, did not help so gave Diltiazem IV dose, dilt drip ordered, will admit.

## 2023-05-03 NOTE — ED ADULT NURSE NOTE - CHIEF COMPLAINT QUOTE
patient verbalized  that I am not feeling well and out of it, weakness and short of breath, cam back from travel on 4/17

## 2023-05-03 NOTE — H&P ADULT - PROBLEM SELECTOR PLAN 3
c/w losartan, carvedilol and cardizem gtt Cr 1.70 on admission  likely in settinf of poor perfusion from rapid Afib   pt got IV contrast with CTA as well  c/w IVF   Monitor BMP Cr 1.70 on admission  likely in setting of poor perfusion from rapid Afib   pt got IV contrast with CTA as well  c/w IVF   Monitor BMP

## 2023-05-03 NOTE — CHART NOTE - NSCHARTNOTEFT_GEN_A_CORE
EVENT: Notified by nurse regarding patient tachycardia and elevated blood pressure     Patient on cardizem gtt, s/p metop IV x2, and Diltiazem IV dose    OBJECTIVE:  Vital Signs Last 24 Hrs  T(C): 37.2 (03 May 2023 19:32), Max: 37.2 (03 May 2023 19:32)  T(F): 98.9 (03 May 2023 19:32), Max: 98.9 (03 May 2023 19:32)  HR: 129 (03 May 2023 22:21) (124 - 173)  BP: 162/126 (03 May 2023 22:21) (132/120 - 170/122)  BP(mean): --  RR: 20 (03 May 2023 21:41) (18 - 22)  SpO2: 96% (03 May 2023 21:41) (88% - 100%)    Parameters below as of 03 May 2023 21:41  Patient On (Oxygen Delivery Method): nasal cannula  O2 Flow (L/min): 2      LABS:                        12.7   8.05  )-----------( 315      ( 03 May 2023 13:50 )             39.2   CARDIAC MARKERS ( 03 May 2023 21:20 )  x     / x     / 611 U/L / x     / 48.3 ng/mL    05-03    138  |  108  |  36<H>  ----------------------------<  135<H>  4.1   |  19<L>  |  1.70<H>    Ca    9.4      03 May 2023 13:50  Mg     2.2     05-03    TPro  7.5  /  Alb  3.4<L>  /  TBili  1.6<H>  /  DBili  x   /  AST  218<H>  /  ALT  135<H>  /  AlkPhos  100  05-03      PROBLEM: New onset atrial fibrillation - AFib w. RVR to 170s. CTA neg for PE   PLAN:   Diltiazem gtt - re-bolus 0.25mg/kg and increase IV drip to 10mg/hr.  s/p metop IV x2, and Diltiazem IV dose  Continue  heparin gtt   f/u TSH  f/u echo  Consider EP eval for ablation. EVENT: Notified by nurse regarding patient tachycardia and elevated blood pressure     HPI: 47 year old female with PMHx HTN and asthma p/w general malaise. Admitted to telemetry for new onset Afib    Patient noted with tachycardia on Cardizem gtt, s/p metop IV x2. Patient denies headache, dizziness, chest pain or palpitations, cough/hemoptysis, or n/v/d/c.     OBJECTIVE:  Vital Signs Last 24 Hrs  T(C): 37.2 (03 May 2023 19:32), Max: 37.2 (03 May 2023 19:32)  T(F): 98.9 (03 May 2023 19:32), Max: 98.9 (03 May 2023 19:32)  HR: 129 (03 May 2023 22:21) (124 - 173)  BP: 162/126 (03 May 2023 22:21) (132/120 - 170/122)  BP(mean): --  RR: 20 (03 May 2023 21:41) (18 - 22)  SpO2: 96% (03 May 2023 21:41) (88% - 100%)    Parameters below as of 03 May 2023 21:41  Patient On (Oxygen Delivery Method): nasal cannula  O2 Flow (L/min): 2      LABS:                        12.7   8.05  )-----------( 315      ( 03 May 2023 13:50 )             39.2   CARDIAC MARKERS ( 03 May 2023 21:20 )  x     / x     / 611 U/L / x     / 48.3 ng/mL    05-03    138  |  108  |  36<H>  ----------------------------<  135<H>  4.1   |  19<L>  |  1.70<H>    Ca    9.4      03 May 2023 13:50  Mg     2.2     05-03    TPro  7.5  /  Alb  3.4<L>  /  TBili  1.6<H>  /  DBili  x   /  AST  218<H>  /  ALT  135<H>  /  AlkPhos  100  05-03      PROBLEM: New onset atrial fibrillation - AFib w. RVR to 170s. CTA neg for PE. s/p metop IV x2, and Diltiazem IV dose  PLAN:   Diltiazem gtt - re-bolus 0.25mg/kg and increase IV drip to 10mg/hr.  Continue  heparin gtt   f/u TSH  f/u echo  Consider EP eval for ablation - Primary team to follow-up

## 2023-05-03 NOTE — H&P ADULT - HISTORY OF PRESENT ILLNESS
47-year-old female hx of HTN, HLD, presenting with 1 week of nausea, fatigue and weakness, with progressive shortness of breath. No chest pain. +palpitations especially when lying down. Travelled to [place] recently, came back a few days ago. No other symptoms. NO hx of AFIb. 47-year-old female hx of HTN and asthma p/w 2 weeks of nausea, fatigue and generalized weakness. Patient states that she recently got back from a trip to [place] 2 weeks and since she has been back she had general malaise. Pt thought she caught a virus however symptoms have been getting progressively worse. Pt did not notice chest pain or palpitations but noted to be more short of breath when exerting herself. Patient mentions that she drank more than usual during this time as she was on vacation but denies use of other substances. Pt denies any other complaints including fever/chills, headache, dizziness, cough/hemoptysis, n/v/d/c, dysuria or leg swelling. NKDA.

## 2023-05-03 NOTE — H&P ADULT - PROBLEM SELECTOR PLAN 2
pt without chest pain  likely demand in setting of rapid afib   started on heparin drip  trend trop to resolution

## 2023-05-03 NOTE — H&P ADULT - ASSESSMENT
Adm for new onset afib  47 year old female with PMHx HTN and asthma p/w general malaise. Admitted to telemetry for new onset Afib

## 2023-05-03 NOTE — ED ADULT NURSE NOTE - OBJECTIVE STATEMENT
Pt AOx4, ambulatory, c/o SOB, weakness and palpitations since this morning. Pt denies CP. Pt tachycardic on arrival. EKG done, pt placed on cardiac monitor, no signs of distress noted.

## 2023-05-03 NOTE — ED PROVIDER NOTE - INTERNATIONAL TRAVEL DAYS 1
7-14 days ([place]) Spironolactone Pregnancy And Lactation Text: This medication can cause feminization of the male fetus and should be avoided during pregnancy. The active metabolite is also found in breast milk.

## 2023-05-03 NOTE — H&P ADULT - PROBLEM SELECTOR PLAN 1
started on cardizem gtt and heparin gtt  Consider EP eval in AM p/w 1 week of nausea, fatigue and weakness,  EKG: AFib w. RVR to 170s.  Trop [id number]  Ddimer 699  CTA neg for PE   s/p metop IV x2, and Diltiazem IV dose  Admit to tele  started on cardizem gtt and heparin gtt   trend trop  f/u TSH  f/u echo  Consider EP eval for ablation

## 2023-05-03 NOTE — H&P ADULT - NSHPPHYSICALEXAM_GEN_ALL_CORE
T(C): 37.2 (05-03-23 @ 19:32), Max: 37.2 (05-03-23 @ 19:32)  HR: 134 (05-03-23 @ 19:32) (124 - 173)  BP: 170/122 (05-03-23 @ 19:32) (132/120 - 170/122)  RR: 20 (05-03-23 @ 19:32) (18 - 22)  SpO2: 97% (05-03-23 @ 19:42) (88% - 100%)    GENERAL: patient appears well, NAD, pleasant  HEAD: normocephalic, atraumatic  EYES: sclera clear, no exudates  ENMT: oropharynx clear without erythema, no exudates, moist mucous membranes  NECK: supple, soft, no thyromegaly noted  LUNGS: clear to auscultation bilaterally, no wheezing, rhonchi or rales appreciated  HEART: S1/S2, +tachycardic, +irregular rhythm, no murmurs noted, no lower extremity edema  GASTROINTESTINAL: abdomen is soft, nondistended, nontender, normoactive bowel sounds, no palpable masses  INTEGUMENT: good skin turgor, no lesions noted  MUSCULOSKELETAL: no clubbing or cyanosis, no obvious deformity  NEUROLOGIC: AAO x3, CNII-XII intact, no obvious sensorimotor deficits noted

## 2023-05-03 NOTE — ED PROVIDER NOTE - OBJECTIVE STATEMENT
47-year-old female hx of HTN, HLD, presenting with 1 week of nausea, fatigue and weakness, with progressive shortness of breath. No chest pain. +palpitations especially when lying down. Travelled to [place] recently, came back a few days ago. No other symptoms. NO hx of AFIb.

## 2023-05-03 NOTE — H&P ADULT - ATTENDING COMMENTS
47-year-old female hx of HTN and asthma p/w 2 weeks of nausea, fatigue and generalized weakness,Afib with rvr,type II MI due to demand ischemia.  1.Tele monitoring.  2.Afib with RVR-heparin drip, cardizem drip.dig.  3.Asthma-stable.  4.GREY-s/p IV contrast.  5.Obtain records from outpatient cardiologist.  6.PPI.

## 2023-05-04 DIAGNOSIS — I21.4 NON-ST ELEVATION (NSTEMI) MYOCARDIAL INFARCTION: ICD-10-CM

## 2023-05-04 DIAGNOSIS — Z02.9 ENCOUNTER FOR ADMINISTRATIVE EXAMINATIONS, UNSPECIFIED: ICD-10-CM

## 2023-05-04 DIAGNOSIS — I50.22 CHRONIC SYSTOLIC (CONGESTIVE) HEART FAILURE: ICD-10-CM

## 2023-05-04 LAB
A1C WITH ESTIMATED AVERAGE GLUCOSE RESULT: 5.7 % — HIGH (ref 4–5.6)
ALBUMIN SERPL ELPH-MCNC: 3.2 G/DL — LOW (ref 3.5–5)
ALP SERPL-CCNC: 93 U/L — SIGNIFICANT CHANGE UP (ref 40–120)
ALT FLD-CCNC: 195 U/L DA — HIGH (ref 10–60)
ANION GAP SERPL CALC-SCNC: 8 MMOL/L — SIGNIFICANT CHANGE UP (ref 5–17)
APTT BLD: 151.3 SEC — CRITICAL HIGH (ref 27.5–35.5)
APTT BLD: 77.3 SEC — HIGH (ref 27.5–35.5)
APTT BLD: >200 SEC — CRITICAL HIGH (ref 27.5–35.5)
AST SERPL-CCNC: 267 U/L — HIGH (ref 10–40)
BILIRUB SERPL-MCNC: 1.5 MG/DL — HIGH (ref 0.2–1.2)
BUN SERPL-MCNC: 32 MG/DL — HIGH (ref 7–18)
CALCIUM SERPL-MCNC: 9.1 MG/DL — SIGNIFICANT CHANGE UP (ref 8.4–10.5)
CHLORIDE SERPL-SCNC: 109 MMOL/L — HIGH (ref 96–108)
CHOLEST SERPL-MCNC: 193 MG/DL — SIGNIFICANT CHANGE UP
CO2 SERPL-SCNC: 22 MMOL/L — SIGNIFICANT CHANGE UP (ref 22–31)
CREAT SERPL-MCNC: 1.46 MG/DL — HIGH (ref 0.5–1.3)
EGFR: 44 ML/MIN/1.73M2 — LOW
ESTIMATED AVERAGE GLUCOSE: 117 MG/DL — HIGH (ref 68–114)
GLUCOSE SERPL-MCNC: 113 MG/DL — HIGH (ref 70–99)
HCT VFR BLD CALC: 35.9 % — SIGNIFICANT CHANGE UP (ref 34.5–45)
HCT VFR BLD CALC: 38.2 % — SIGNIFICANT CHANGE UP (ref 34.5–45)
HDLC SERPL-MCNC: 43 MG/DL — LOW
HGB BLD-MCNC: 11.9 G/DL — SIGNIFICANT CHANGE UP (ref 11.5–15.5)
HGB BLD-MCNC: 12.4 G/DL — SIGNIFICANT CHANGE UP (ref 11.5–15.5)
LIPID PNL WITH DIRECT LDL SERPL: 138 MG/DL — HIGH
MAGNESIUM SERPL-MCNC: 2.1 MG/DL — SIGNIFICANT CHANGE UP (ref 1.6–2.6)
MCHC RBC-ENTMCNC: 32.5 GM/DL — SIGNIFICANT CHANGE UP (ref 32–36)
MCHC RBC-ENTMCNC: 33.1 GM/DL — SIGNIFICANT CHANGE UP (ref 32–36)
MCHC RBC-ENTMCNC: 33.7 PG — SIGNIFICANT CHANGE UP (ref 27–34)
MCHC RBC-ENTMCNC: 33.7 PG — SIGNIFICANT CHANGE UP (ref 27–34)
MCV RBC AUTO: 101.7 FL — HIGH (ref 80–100)
MCV RBC AUTO: 103.8 FL — HIGH (ref 80–100)
NON HDL CHOLESTEROL: 150 MG/DL — HIGH
NRBC # BLD: 0 /100 WBCS — SIGNIFICANT CHANGE UP (ref 0–0)
NRBC # BLD: 0 /100 WBCS — SIGNIFICANT CHANGE UP (ref 0–0)
PHOSPHATE SERPL-MCNC: 3.5 MG/DL — SIGNIFICANT CHANGE UP (ref 2.5–4.5)
PLATELET # BLD AUTO: 280 K/UL — SIGNIFICANT CHANGE UP (ref 150–400)
PLATELET # BLD AUTO: 299 K/UL — SIGNIFICANT CHANGE UP (ref 150–400)
POTASSIUM SERPL-MCNC: 3.9 MMOL/L — SIGNIFICANT CHANGE UP (ref 3.5–5.3)
POTASSIUM SERPL-SCNC: 3.9 MMOL/L — SIGNIFICANT CHANGE UP (ref 3.5–5.3)
PROT SERPL-MCNC: 7 G/DL — SIGNIFICANT CHANGE UP (ref 6–8.3)
RBC # BLD: 3.53 M/UL — LOW (ref 3.8–5.2)
RBC # BLD: 3.68 M/UL — LOW (ref 3.8–5.2)
RBC # FLD: 14.7 % — HIGH (ref 10.3–14.5)
RBC # FLD: 14.7 % — HIGH (ref 10.3–14.5)
SODIUM SERPL-SCNC: 139 MMOL/L — SIGNIFICANT CHANGE UP (ref 135–145)
TRIGL SERPL-MCNC: 59 MG/DL — SIGNIFICANT CHANGE UP
TROPONIN I, HIGH SENSITIVITY RESULT: HIGH NG/L
WBC # BLD: 8.9 K/UL — SIGNIFICANT CHANGE UP (ref 3.8–10.5)
WBC # BLD: 9.48 K/UL — SIGNIFICANT CHANGE UP (ref 3.8–10.5)
WBC # FLD AUTO: 8.9 K/UL — SIGNIFICANT CHANGE UP (ref 3.8–10.5)
WBC # FLD AUTO: 9.48 K/UL — SIGNIFICANT CHANGE UP (ref 3.8–10.5)

## 2023-05-04 RX ORDER — PANTOPRAZOLE SODIUM 20 MG/1
40 TABLET, DELAYED RELEASE ORAL
Refills: 0 | Status: DISCONTINUED | OUTPATIENT
Start: 2023-05-04 | End: 2023-05-08

## 2023-05-04 RX ORDER — SACUBITRIL AND VALSARTAN 24; 26 MG/1; MG/1
1 TABLET, FILM COATED ORAL
Refills: 0 | Status: DISCONTINUED | OUTPATIENT
Start: 2023-05-04 | End: 2023-05-08

## 2023-05-04 RX ORDER — DIGOXIN 250 MCG
250 TABLET ORAL ONCE
Refills: 0 | Status: COMPLETED | OUTPATIENT
Start: 2023-05-04 | End: 2023-05-04

## 2023-05-04 RX ADMIN — HEPARIN SODIUM 0 UNIT(S)/HR: 5000 INJECTION INTRAVENOUS; SUBCUTANEOUS at 02:11

## 2023-05-04 RX ADMIN — HEPARIN SODIUM 1800 UNIT(S)/HR: 5000 INJECTION INTRAVENOUS; SUBCUTANEOUS at 01:44

## 2023-05-04 RX ADMIN — HEPARIN SODIUM 1500 UNIT(S)/HR: 5000 INJECTION INTRAVENOUS; SUBCUTANEOUS at 10:23

## 2023-05-04 RX ADMIN — Medication 250 MICROGRAM(S): at 11:14

## 2023-05-04 RX ADMIN — HEPARIN SODIUM 1500 UNIT(S)/HR: 5000 INJECTION INTRAVENOUS; SUBCUTANEOUS at 07:23

## 2023-05-04 RX ADMIN — HEPARIN SODIUM 0 UNIT(S)/HR: 5000 INJECTION INTRAVENOUS; SUBCUTANEOUS at 10:40

## 2023-05-04 RX ADMIN — HEPARIN SODIUM 1200 UNIT(S)/HR: 5000 INJECTION INTRAVENOUS; SUBCUTANEOUS at 11:43

## 2023-05-04 RX ADMIN — HEPARIN SODIUM 1500 UNIT(S)/HR: 5000 INJECTION INTRAVENOUS; SUBCUTANEOUS at 03:12

## 2023-05-04 RX ADMIN — CARVEDILOL PHOSPHATE 12.5 MILLIGRAM(S): 80 CAPSULE, EXTENDED RELEASE ORAL at 05:46

## 2023-05-04 RX ADMIN — Medication 20 MILLIGRAM(S): at 05:49

## 2023-05-04 RX ADMIN — LOSARTAN POTASSIUM 50 MILLIGRAM(S): 100 TABLET, FILM COATED ORAL at 05:46

## 2023-05-04 RX ADMIN — SACUBITRIL AND VALSARTAN 1 TABLET(S): 24; 26 TABLET, FILM COATED ORAL at 22:50

## 2023-05-04 RX ADMIN — Medication 81 MILLIGRAM(S): at 13:29

## 2023-05-04 RX ADMIN — SPIRONOLACTONE 25 MILLIGRAM(S): 25 TABLET, FILM COATED ORAL at 05:45

## 2023-05-04 RX ADMIN — CARVEDILOL PHOSPHATE 12.5 MILLIGRAM(S): 80 CAPSULE, EXTENDED RELEASE ORAL at 18:34

## 2023-05-04 RX ADMIN — HEPARIN SODIUM 1200 UNIT(S)/HR: 5000 INJECTION INTRAVENOUS; SUBCUTANEOUS at 18:35

## 2023-05-04 RX ADMIN — SACUBITRIL AND VALSARTAN 1 TABLET(S): 24; 26 TABLET, FILM COATED ORAL at 11:23

## 2023-05-04 NOTE — PATIENT PROFILE ADULT - FALL HARM RISK - HARM RISK INTERVENTIONS

## 2023-05-04 NOTE — ED ADULT NURSE REASSESSMENT NOTE - NS ED NURSE REASSESS COMMENT FT1
Pt in stable condition, aox4, denies pain. HR and BP continue to be elevated. DELMY Sanchez aware. Heparin drip running at 18ml/hr and diltiazem 5mg. Pt on cardiac monitor. Endorsed to MARY Arana.
aptt drawn and collected at 2120 and sent to lab. pending results. lab called and stated "placed in refrigerator" which caused a delay in rate change. miranda reyna notified to f/u with poc.

## 2023-05-04 NOTE — PROGRESS NOTE ADULT - ASSESSMENT
47 year old female with PMHx HTN and asthma p/w general malaise. Admitted to telemetry for new onset Afib. CTA neg for PE.

## 2023-05-04 NOTE — PATIENT PROFILE ADULT - NSPROGENBLOODRESTRICT_GEN_A_NUR
Discharge Summary  Interventional Cardiology      Admit Date: 3/2/2023    Discharge Date:  3/2/2023    Attending Physician: Devon Garnica MD    Discharge Physician: Kasia Mclean MD    Principal Diagnoses: <principal problem not specified>  Indication for Admission: Angiogram, Coronary (N/A), Bypass graft study    Discharged Condition: Good    Hospital Course:   Patient presented for outpatient coronary angiogram which went without complication. Coronary angiogram revealed  of prior LCX stent , patent LIMA-LAD  . See full cath report in Epic for details. Hemostasis of patient's L CFA access site was achieved with manual pressure. Patient was monitored per post-cath protocol, and his L groin access site was c/d/i with no hematoma. Patient was able to ambulate without difficulty. He was feeling well and anticipating discharge home today.     Outpatient Plan:  - There were no medication changes    Diet: Cardiac diet    Activity: Ad ailyn, wound care instructions provided    Disposition: Home or Self Care    Follow Up: with DR Garnica in 4 weeks       Discharge Medications:      Medication List        CONTINUE taking these medications      albuterol 90 mcg/actuation inhaler  Commonly known as: VENTOLIN HFA  Inhale 2 puffs into the lungs every 6 (six) hours as needed for Wheezing. Rescue     allopurinoL 100 MG tablet  Commonly known as: ZYLOPRIM  TAKE 1 TABLET EVERY DAY     bumetanide 1 MG tablet  Commonly known as: BUMEX  Take 1 tablet (1 mg total) by mouth once daily.     ferrous gluconate 324 MG tablet  Commonly known as: FERGON  Take 1 tablet (324 mg total) by mouth daily with breakfast.     fish oil-omega-3 fatty acids 300-1,000 mg capsule     glimepiride 1 MG tablet  Commonly known as: AMARYL     loperamide 2 mg capsule  Commonly known as: IMODIUM     metoprolol succinate 25 MG 24 hr tablet  Commonly known as: TOPROL-XL  Take 1 tablet (25 mg total) by mouth once daily.     midazolam 1 mg/mL  injection  Commonly known as: VERSED     NASAL DECONGESTANT (OXYMETAZL) 0.05 % nasal spray  Generic drug: oxymetazoline  Use 2 sprays by Nasal route daily as needed (nose bleed).     rosuvastatin 40 MG Tab  Commonly known as: CRESTOR  TAKE 1 TABLET EVERY EVENING.     tramadol-acetaminophen 37.5-325 mg 37.5-325 mg Tab  Commonly known as: ULTRACET  1 tablet by Per J Tube route every 12 (twelve) hours as needed for Pain (pain).     warfarin 5 MG tablet  Commonly known as: COUMADIN  warfarin 7.5 (1.5 tablets) sun and Thursday, and 5mg other days              none

## 2023-05-04 NOTE — PROGRESS NOTE ADULT - ASSESSMENT
Patient with Hx of HTN, CM-2018 here with afib with RVR,Type II MI due to demand ischemia.  1.Tele monitoring.  2.D/W outpatient cardiologist-Dr. Elkins [phone], pt dx with non-ischemic CM 2018 at [facility] and subsequent echo and stress test 2012-no ischemia EF 39%.  3.Afib with RVR-d/c cardizem, coreg,dig,heparin drip.  4.TYpe II MI due to demand ischemia.  5.Echocardiogram.  6.Chronic systolic HF-d/c lasix,mario and cozaar,start entresto 24/26.  7.Will need cardiac cath and EPS eval for possible cardioversion/ablation when stable.  8.PPI.

## 2023-05-04 NOTE — PROGRESS NOTE ADULT - SUBJECTIVE AND OBJECTIVE BOX
NP Note discussed with  primary attending    Patient is a 47y old  Female who presents with a chief complaint of new onset Afib (04 May 2023 10:41)      INTERVAL HPI/OVERNIGHT EVENTS: no new complaints    MEDICATIONS  (STANDING):  aspirin  chewable 81 milliGRAM(s) Oral daily  carvedilol 12.5 milliGRAM(s) Oral every 12 hours  digoxin  Injectable 250 MICROGram(s) IV Push once  heparin  Infusion.  Unit(s)/Hr (18 mL/Hr) IV Continuous <Continuous>  sacubitril 24 mG/valsartan 26 mG 1 Tablet(s) Oral two times a day    MEDICATIONS  (PRN):  acetaminophen     Tablet .. 650 milliGRAM(s) Oral every 6 hours PRN Temp greater or equal to 38C (100.4F), Mild Pain (1 - 3)  albuterol    90 MICROgram(s) HFA Inhaler 2 Puff(s) Inhalation every 6 hours PRN Shortness of Breath and/or Wheezing  heparin   Injectable 8500 Unit(s) IV Push every 6 hours PRN For aPTT less than 40  heparin   Injectable 4000 Unit(s) IV Push every 6 hours PRN For aPTT between 40 - 57  melatonin 3 milliGRAM(s) Oral at bedtime PRN Insomnia      __________________________________________________  REVIEW OF SYSTEMS:    CONSTITUTIONAL: No fever,   EYES: no acute visual disturbances  NECK: No pain or stiffness  RESPIRATORY: No cough; No shortness of breath  CARDIOVASCULAR: No chest pain, no palpitations  GASTROINTESTINAL: No pain. No nausea or vomiting; No diarrhea   NEUROLOGICAL: No headache or numbness, no tremors  MUSCULOSKELETAL: No joint pain, no muscle pain  GENITOURINARY: no dysuria, no frequency, no hesitancy  PSYCHIATRY: no depression , no anxiety  ALL OTHER  ROS negative        Vital Signs Last 24 Hrs  T(C): 36.8 (04 May 2023 08:31), Max: 37.2 (03 May 2023 19:32)  T(F): 98.2 (04 May 2023 08:31), Max: 98.9 (03 May 2023 19:32)  HR: 92 (04 May 2023 08:31) (83 - 173)  BP: 140/105 (04 May 2023 08:31) (110/83 - 170/122)  BP(mean): --  RR: 19 (04 May 2023 08:31) (18 - 31)  SpO2: 94% (04 May 2023 08:31) (88% - 100%)    Parameters below as of 04 May 2023 08:31  Patient On (Oxygen Delivery Method): nasal cannula  O2 Flow (L/min): 4      ________________________________________________  PHYSICAL EXAM:  GENERAL: NAD  HEENT: Normocephalic;  conjunctivae and sclerae clear; moist mucous membranes;   NECK : supple  CHEST/LUNG: Clear to ausculitation bilaterally with good air entry   HEART: S1 S2  regular; no murmurs, gallops or rubs  ABDOMEN: Soft, Nontender, Nondistended; Bowel sounds present  EXTREMITIES: no cyanosis; no edema; no calf tenderness  SKIN: warm and dry; no rash  NERVOUS SYSTEM:  Awake and alert; Oriented  to place, person and time ; no new deficits    _________________________________________________  LABS:                        11.9   8.90  )-----------( 280      ( 04 May 2023 07:13 )             35.9     05-04    139  |  109<H>  |  32<H>  ----------------------------<  113<H>  3.9   |  22  |  1.46<H>    Ca    9.1      04 May 2023 07:13  Phos  3.5     05-04  Mg     2.1     05-04    TPro  7.0  /  Alb  3.2<L>  /  TBili  1.5<H>  /  DBili  x   /  AST  267<H>  /  ALT  195<H>  /  AlkPhos  93  05-04    PT/INR - ( 03 May 2023 13:50 )   PT: 16.4 sec;   INR: 1.37 ratio         PTT - ( 04 May 2023 09:08 )  PTT:151.3 sec    CAPILLARY BLOOD GLUCOSE      POCT Blood Glucose.: 108 mg/dL (03 May 2023 14:03)        RADIOLOGY & ADDITIONAL TESTS:  < from: CT Angio Chest PE Protocol w/ IV Cont (05.03.23 @ 18:45) >  IMPRESSION:    1.  No pulmonary embolism, however, the mid to distal segmental and   subsegmental divisions are not well evaluated due to respiratory motion.  2.  Right pleural effusion, septal thickening, groundglass opacities and   patchy consolidation can occur in the clinical setting of pulmonary edema.  3.  Cardiomegaly.      < end of copied text >  < from: Xray Chest 1 View- PORTABLE-Urgent (Xray Chest 1 View- PORTABLE-Urgent .) (05.03.23 @ 16:35) >    IMPRESSION: Cardiomegaly.  Mild bilateral RIGHT greater than LEFT diffuse airspace disease.      < end of copied text >    Imaging Personally Reviewed:  YES  Consultant(s) Notes Reviewed:   YES    Care Discussed with Consultants :     Plan of care was discussed with patient and /or primary care giver; all questions and concerns were addressed and care was aligned with patient's wishes.

## 2023-05-04 NOTE — PROGRESS NOTE ADULT - SUBJECTIVE AND OBJECTIVE BOX
CHIEF COMPLAINT:Patient is a 47y old  Female who presents with a chief complaint of new onset Afib .Pt appears comfortable.    	  REVIEW OF SYSTEMS:  CONSTITUTIONAL: No fever, weight loss, or fatigue  EYES: No eye pain, visual disturbances, or discharge  ENT:  No difficulty hearing, tinnitus, vertigo; No sinus or throat pain  NECK: No pain or stiffness  RESPIRATORY: No cough, wheezing, chills or hemoptysis; No Shortness of Breath  CARDIOVASCULAR: No chest pain, palpitations, passing out, dizziness, or leg swelling  GASTROINTESTINAL: No abdominal or epigastric pain. No nausea, vomiting, or hematemesis; No diarrhea or constipation. No melena or hematochezia.  GENITOURINARY: No dysuria, frequency, hematuria, or incontinence  NEUROLOGICAL: No headaches, memory loss, loss of strength, numbness, or tremors  SKIN: No itching, burning, rashes, or lesions   LYMPH Nodes: No enlarged glands  ENDOCRINE: No heat or cold intolerance; No hair loss  MUSCULOSKELETAL: No joint pain or swelling; No muscle, back, or extremity pain  PSYCHIATRIC: No depression, anxiety, mood swings, or difficulty sleeping  HEME/LYMPH: No easy bruising, or bleeding gums  ALLERGY AND IMMUNOLOGIC: No hives or eczema	      PHYSICAL EXAM:  T(C): 36.8 (05-04-23 @ 08:31), Max: 37.2 (05-03-23 @ 19:32)  HR: 92 (05-04-23 @ 08:31) (83 - 173)  BP: 140/105 (05-04-23 @ 08:31) (110/83 - 170/122)  RR: 19 (05-04-23 @ 08:31) (18 - 31)  SpO2: 94% (05-04-23 @ 08:31) (88% - 100%)  Wt(kg): --  I&O's Summary      Appearance: Normal	  HEENT:   Normal oral mucosa, PERRL, EOMI	  Lymphatic: No lymphadenopathy  Cardiovascular: Normal S1 S2, No JVD, No murmurs, No edema  Respiratory: Lungs clear to auscultation	  Psychiatry: A & O x 3, Mood & affect appropriate  Gastrointestinal:  Soft, Non-tender, + BS	  Skin: No rashes, No ecchymoses, No cyanosis	  Neurologic: Non-focal  Extremities: Normal range of motion, No clubbing, cyanosis or edema  Vascular: Peripheral pulses palpable 2+ bilaterally    MEDICATIONS  (STANDING):  aspirin  chewable 81 milliGRAM(s) Oral daily  carvedilol 12.5 milliGRAM(s) Oral every 12 hours  digoxin  Injectable 250 MICROGram(s) IV Push once  diltiazem Infusion 10 mG/Hr (10 mL/Hr) IV Continuous <Continuous>  furosemide    Tablet 20 milliGRAM(s) Oral daily  heparin  Infusion.  Unit(s)/Hr (18 mL/Hr) IV Continuous <Continuous>  lactated ringers. 1000 milliLiter(s) (100 mL/Hr) IV Continuous <Continuous>  losartan 50 milliGRAM(s) Oral daily  spironolactone 25 milliGRAM(s) Oral daily      TELEMETRY: 	afib with WCT    	  	  LABS:	 	    CARDIAC MARKERS:  CARDIAC MARKERS ( 03 May 2023 21:20 )  x     / x     / 611 U/L / x     / 48.3 ng/mL      Troponin I, High Sensitivity Result: 84454.6 ng/L (05-04 @ 07:13)  Troponin I, High Sensitivity Result: 04088.4 ng/L (05-03 @ 21:20)  Troponin I, High Sensitivity Result: 78686.8 ng/L (05-03 @ 13:50)                            11.9   8.90  )-----------( 280      ( 04 May 2023 07:13 )             35.9     05-04    139  |  109<H>  |  32<H>  ----------------------------<  113<H>  3.9   |  22  |  1.46<H>    Ca    9.1      04 May 2023 07:13  Phos  3.5     05-04  Mg     2.1     05-04    TPro  7.0  /  Alb  3.2<L>  /  TBili  1.5<H>  /  DBili  x   /  AST  267<H>  /  ALT  195<H>  /  AlkPhos  93  05-04    proBNP:   Lipid Profile: Cholesterol 193  LDL --  HDL 43  TG 59  Ldl calc 138  Ratio --    HgA1c:   TSH: Thyroid Stimulating Hormone, Serum: 1.51 uU/mL (05-03 @ 21:20)

## 2023-05-04 NOTE — PATIENT PROFILE ADULT - NSPROLASTMENSTRUAL_GEN_A_NUR
December 19, 2019    Vira Moreno  [address] [place] [address] [address] [facility] [id number]    Dear ,  This letter is regarding your recent Pap smear (cervical cancer screening) and Human Papillomavirus (HPV) test.  We are happy to inform you that your Pap smear result is normal. Cervical cancer is closely linked with certain types of HPV. Your results showed no evidence of high-risk HPV.  We recommend you have your next PAP smear and HPV test in 5 years.  You will still need to return to the clinic every year for an annual exam and other preventive tests.  If you have additional questions regarding this result, please call our registered nurse, Rose Marie at [phone].  Sincerely,    Lizzette Holder MD/aravind  
04/08/2022

## 2023-05-05 LAB
ALBUMIN SERPL ELPH-MCNC: 2.9 G/DL — LOW (ref 3.5–5)
ALP SERPL-CCNC: 88 U/L — SIGNIFICANT CHANGE UP (ref 40–120)
ALT FLD-CCNC: 207 U/L DA — HIGH (ref 10–60)
ANION GAP SERPL CALC-SCNC: 3 MMOL/L — LOW (ref 5–17)
APTT BLD: 65.6 SEC — HIGH (ref 27.5–35.5)
AST SERPL-CCNC: 176 U/L — HIGH (ref 10–40)
BILIRUB SERPL-MCNC: 1 MG/DL — SIGNIFICANT CHANGE UP (ref 0.2–1.2)
BUN SERPL-MCNC: 21 MG/DL — HIGH (ref 7–18)
CALCIUM SERPL-MCNC: 9 MG/DL — SIGNIFICANT CHANGE UP (ref 8.4–10.5)
CHLORIDE SERPL-SCNC: 111 MMOL/L — HIGH (ref 96–108)
CO2 SERPL-SCNC: 27 MMOL/L — SIGNIFICANT CHANGE UP (ref 22–31)
CREAT SERPL-MCNC: 1.2 MG/DL — SIGNIFICANT CHANGE UP (ref 0.5–1.3)
EGFR: 56 ML/MIN/1.73M2 — LOW
FERRITIN SERPL-MCNC: 263 NG/ML — HIGH (ref 15–150)
GLUCOSE SERPL-MCNC: 100 MG/DL — HIGH (ref 70–99)
HCT VFR BLD CALC: 41.1 % — SIGNIFICANT CHANGE UP (ref 34.5–45)
HGB BLD-MCNC: 13.1 G/DL — SIGNIFICANT CHANGE UP (ref 11.5–15.5)
HIV 1+2 AB+HIV1 P24 AG SERPL QL IA: SIGNIFICANT CHANGE UP
MCHC RBC-ENTMCNC: 31.9 GM/DL — LOW (ref 32–36)
MCHC RBC-ENTMCNC: 33 PG — SIGNIFICANT CHANGE UP (ref 27–34)
MCV RBC AUTO: 103.5 FL — HIGH (ref 80–100)
NRBC # BLD: 0 /100 WBCS — SIGNIFICANT CHANGE UP (ref 0–0)
PLATELET # BLD AUTO: 273 K/UL — SIGNIFICANT CHANGE UP (ref 150–400)
POTASSIUM SERPL-MCNC: 3.8 MMOL/L — SIGNIFICANT CHANGE UP (ref 3.5–5.3)
POTASSIUM SERPL-SCNC: 3.8 MMOL/L — SIGNIFICANT CHANGE UP (ref 3.5–5.3)
PROT SERPL-MCNC: 6.5 G/DL — SIGNIFICANT CHANGE UP (ref 6–8.3)
RBC # BLD: 3.97 M/UL — SIGNIFICANT CHANGE UP (ref 3.8–5.2)
RBC # FLD: 15 % — HIGH (ref 10.3–14.5)
SODIUM SERPL-SCNC: 141 MMOL/L — SIGNIFICANT CHANGE UP (ref 135–145)
WBC # BLD: 7.61 K/UL — SIGNIFICANT CHANGE UP (ref 3.8–10.5)
WBC # FLD AUTO: 7.61 K/UL — SIGNIFICANT CHANGE UP (ref 3.8–10.5)

## 2023-05-05 RX ORDER — DIGOXIN 250 MCG
125 TABLET ORAL DAILY
Refills: 0 | Status: DISCONTINUED | OUTPATIENT
Start: 2023-05-05 | End: 2023-05-08

## 2023-05-05 RX ORDER — CARVEDILOL PHOSPHATE 80 MG/1
18.75 CAPSULE, EXTENDED RELEASE ORAL EVERY 12 HOURS
Refills: 0 | Status: DISCONTINUED | OUTPATIENT
Start: 2023-05-05 | End: 2023-05-08

## 2023-05-05 RX ORDER — CARVEDILOL PHOSPHATE 80 MG/1
6.25 CAPSULE, EXTENDED RELEASE ORAL ONCE
Refills: 0 | Status: COMPLETED | OUTPATIENT
Start: 2023-05-05 | End: 2023-05-05

## 2023-05-05 RX ADMIN — PANTOPRAZOLE SODIUM 40 MILLIGRAM(S): 20 TABLET, DELAYED RELEASE ORAL at 06:44

## 2023-05-05 RX ADMIN — Medication 81 MILLIGRAM(S): at 11:54

## 2023-05-05 RX ADMIN — SACUBITRIL AND VALSARTAN 1 TABLET(S): 24; 26 TABLET, FILM COATED ORAL at 06:44

## 2023-05-05 RX ADMIN — Medication 125 MICROGRAM(S): at 10:41

## 2023-05-05 RX ADMIN — HEPARIN SODIUM 1200 UNIT(S)/HR: 5000 INJECTION INTRAVENOUS; SUBCUTANEOUS at 01:45

## 2023-05-05 RX ADMIN — HEPARIN SODIUM 1200 UNIT(S)/HR: 5000 INJECTION INTRAVENOUS; SUBCUTANEOUS at 18:55

## 2023-05-05 RX ADMIN — CARVEDILOL PHOSPHATE 6.25 MILLIGRAM(S): 80 CAPSULE, EXTENDED RELEASE ORAL at 10:43

## 2023-05-05 RX ADMIN — HEPARIN SODIUM 1200 UNIT(S)/HR: 5000 INJECTION INTRAVENOUS; SUBCUTANEOUS at 07:39

## 2023-05-05 RX ADMIN — HEPARIN SODIUM 1200 UNIT(S)/HR: 5000 INJECTION INTRAVENOUS; SUBCUTANEOUS at 09:10

## 2023-05-05 RX ADMIN — CARVEDILOL PHOSPHATE 12.5 MILLIGRAM(S): 80 CAPSULE, EXTENDED RELEASE ORAL at 06:44

## 2023-05-05 RX ADMIN — CARVEDILOL PHOSPHATE 18.75 MILLIGRAM(S): 80 CAPSULE, EXTENDED RELEASE ORAL at 21:38

## 2023-05-05 RX ADMIN — SACUBITRIL AND VALSARTAN 1 TABLET(S): 24; 26 TABLET, FILM COATED ORAL at 19:16

## 2023-05-05 NOTE — PROGRESS NOTE ADULT - PROBLEM/PLAN-4
Consent: The risks of atrophy were reviewed with the patient.
DISPLAY PLAN FREE TEXT
DISPLAY PLAN FREE TEXT

## 2023-05-05 NOTE — PROGRESS NOTE ADULT - SUBJECTIVE AND OBJECTIVE BOX
NP Note discussed with  primary attending    Patient is a 47y old  Female who presents with a chief complaint of new onset Afib (05 May 2023 09:17)      INTERVAL HPI/OVERNIGHT EVENTS: no new complaints    MEDICATIONS  (STANDING):  aspirin  chewable 81 milliGRAM(s) Oral daily  carvedilol 18.75 milliGRAM(s) Oral every 12 hours  digoxin     Tablet 125 MICROGram(s) Oral daily  heparin  Infusion.  Unit(s)/Hr (18 mL/Hr) IV Continuous <Continuous>  pantoprazole    Tablet 40 milliGRAM(s) Oral before breakfast  sacubitril 24 mG/valsartan 26 mG 1 Tablet(s) Oral two times a day    MEDICATIONS  (PRN):  acetaminophen     Tablet .. 650 milliGRAM(s) Oral every 6 hours PRN Temp greater or equal to 38C (100.4F), Mild Pain (1 - 3)  albuterol    90 MICROgram(s) HFA Inhaler 2 Puff(s) Inhalation every 6 hours PRN Shortness of Breath and/or Wheezing  heparin   Injectable 8500 Unit(s) IV Push every 6 hours PRN For aPTT less than 40  heparin   Injectable 4000 Unit(s) IV Push every 6 hours PRN For aPTT between 40 - 57  melatonin 3 milliGRAM(s) Oral at bedtime PRN Insomnia      __________________________________________________  REVIEW OF SYSTEMS:    CONSTITUTIONAL: No fever,   EYES: no acute visual disturbances  NECK: No pain or stiffness  RESPIRATORY: No cough; No shortness of breath  CARDIOVASCULAR: No chest pain, no palpitations  GASTROINTESTINAL: No pain. No nausea or vomiting; No diarrhea   NEUROLOGICAL: No headache or numbness, no tremors  MUSCULOSKELETAL: No joint pain, no muscle pain  GENITOURINARY: no dysuria, no frequency, no hesitancy  PSYCHIATRY: no depression , no anxiety  ALL OTHER  ROS negative        Vital Signs Last 24 Hrs  T(C): 36.6 (05 May 2023 11:10), Max: 37.3 (04 May 2023 23:26)  T(F): 97.9 (05 May 2023 11:10), Max: 99.1 (04 May 2023 23:26)  HR: 105 (05 May 2023 11:10) (60 - 119)  BP: 124/78 (05 May 2023 11:10) (124/78 - 159/88)  BP(mean): --  RR: 18 (05 May 2023 11:10) (18 - 19)  SpO2: 95% (05 May 2023 11:10) (94% - 100%)    Parameters below as of 05 May 2023 11:10  Patient On (Oxygen Delivery Method): room air        ________________________________________________  PHYSICAL EXAM:  GENERAL: NAD  HEENT: Normocephalic;  conjunctivae and sclerae clear; moist mucous membranes;   NECK : supple  CHEST/LUNG: Clear to ausculitation bilaterally with good air entry   HEART: S1 S2  regular; no murmurs, gallops or rubs  ABDOMEN: Soft, Nontender, Nondistended; Bowel sounds present  EXTREMITIES: no cyanosis; no edema; no calf tenderness  SKIN: warm and dry; no rash  NERVOUS SYSTEM:  Awake and alert; Oriented  to place, person and time ; no new deficits    _________________________________________________  LABS:                        13.1   7.61  )-----------( 273      ( 05 May 2023 06:50 )             41.1     05-05    141  |  111<H>  |  21<H>  ----------------------------<  100<H>  3.8   |  27  |  1.20    Ca    9.0      05 May 2023 06:50  Phos  3.5     05-04  Mg     2.1     05-04    TPro  6.5  /  Alb  2.9<L>  /  TBili  1.0  /  DBili  x   /  AST  176<H>  /  ALT  207<H>  /  AlkPhos  88  05-05    PT/INR - ( 03 May 2023 13:50 )   PT: 16.4 sec;   INR: 1.37 ratio         PTT - ( 05 May 2023 00:31 )  PTT:65.6 sec    CAPILLARY BLOOD GLUCOSE            RADIOLOGY & ADDITIONAL TESTS:  < from: CT Angio Chest PE Protocol w/ IV Cont (05.03.23 @ 18:45) >  IMPRESSION:    1.  No pulmonary embolism, however, the mid to distal segmental and   subsegmental divisions are not well evaluated due to respiratory motion.  2.  Right pleural effusion, septal thickening, groundglass opacities and   patchy consolidation can occur in the clinical setting of pulmonary edema.  3.  Cardiomegaly.      < end of copied text >  < from: US Duplex Venous Lower Ext Ltd, Right (02.05.20 @ 12:12) >  IMPRESSION:     No evidence of right lower extremity deep venous thrombosis.        < end of copied text >    Imaging Personally Reviewed:  YES    Consultant(s) Notes Reviewed:   YES    Care Discussed with Consultants :     Plan of care was discussed with patient and /or primary care giver; all questions and concerns were addressed and care was aligned with patient's wishes.

## 2023-05-05 NOTE — PROGRESS NOTE ADULT - ASSESSMENT
Patient with Hx of HTN, CM-2018 here with afib with RVR,Type II MI due to demand ischemia.  1.Tele monitoring.  2.D/W outpatient cardiologist-Dr. Elkins [phone], pt dx with non-ischemic CM 2018 at [facility] and subsequent echo and stress test 2012-no ischemia EF 39%.  3.Afib with RVR-inc coreg 18.75mg bid,,dig,heparin drip.  4.TYpe II MI due to demand ischemia.  5.Echocardiogram.  6.Chronic systolic HF- entresto 24/26.  7.Will need cardiac cath and EPS eval for possible cardioversion/ablation when stable.  8.PPI.

## 2023-05-05 NOTE — PROGRESS NOTE ADULT - SUBJECTIVE AND OBJECTIVE BOX
CHIEF COMPLAINT:Patient is a 47y old  Female who presents with a chief complaint of new onset Afib .Pt appears comfortable.    	  REVIEW OF SYSTEMS:  CONSTITUTIONAL: No fever, weight loss, or fatigue  EYES: No eye pain, visual disturbances, or discharge  ENT:  No difficulty hearing, tinnitus, vertigo; No sinus or throat pain  NECK: No pain or stiffness  RESPIRATORY: No cough, wheezing, chills or hemoptysis; No Shortness of Breath  CARDIOVASCULAR: No chest pain, palpitations, passing out, dizziness, or leg swelling  GASTROINTESTINAL: No abdominal or epigastric pain. No nausea, vomiting, or hematemesis; No diarrhea or constipation. No melena or hematochezia.  GENITOURINARY: No dysuria, frequency, hematuria, or incontinence  NEUROLOGICAL: No headaches, memory loss, loss of strength, numbness, or tremors  SKIN: No itching, burning, rashes, or lesions   LYMPH Nodes: No enlarged glands  ENDOCRINE: No heat or cold intolerance; No hair loss  MUSCULOSKELETAL: No joint pain or swelling; No muscle, back, or extremity pain  PSYCHIATRIC: No depression, anxiety, mood swings, or difficulty sleeping  HEME/LYMPH: No easy bruising, or bleeding gums  ALLERGY AND IMMUNOLOGIC: No hives or eczema	      PHYSICAL EXAM:  T(C): 36.5 (05-05-23 @ 08:05), Max: 37.3 (05-04-23 @ 23:26)  HR: 119 (05-05-23 @ 08:05) (60 - 119)  BP: 141/103 (05-05-23 @ 08:05) (125/92 - 159/88)  RR: 19 (05-05-23 @ 08:05) (18 - 19)  SpO2: 95% (05-05-23 @ 08:05) (94% - 100%)  Wt(kg): --  I&O's Summary      Appearance: Normal	  HEENT:   Normal oral mucosa, PERRL, EOMI	  Lymphatic: No lymphadenopathy  Cardiovascular: Normal S1 S2, No JVD, No murmurs, No edema  Respiratory: Lungs clear to auscultation	  Psychiatry: A & O x 3, Mood & affect appropriate  Gastrointestinal:  Soft, Non-tender, + BS	  Skin: No rashes, No ecchymoses, No cyanosis	  Neurologic: Non-focal  Extremities: Normal range of motion, No clubbing, cyanosis or edema  Vascular: Peripheral pulses palpable 2+ bilaterally    MEDICATIONS  (STANDING):  aspirin  chewable 81 milliGRAM(s) Oral daily  carvedilol 18.75 milliGRAM(s) Oral every 12 hours  carvedilol 6.25 milliGRAM(s) Oral once  digoxin     Tablet 125 MICROGram(s) Oral daily  heparin  Infusion.  Unit(s)/Hr (18 mL/Hr) IV Continuous <Continuous>  pantoprazole    Tablet 40 milliGRAM(s) Oral before breakfast  sacubitril 24 mG/valsartan 26 mG 1 Tablet(s) Oral two times a day      TELEMETRY: 	afib upto 150's,pvc's    	  	  LABS:	 	    CARDIAC MARKERS:  CARDIAC MARKERS ( 03 May 2023 21:20 )  x     / x     / 611 U/L / x     / 48.3 ng/mL      Troponin I, High Sensitivity Result: 99985.6 ng/L (05-04 @ 07:13)  Troponin I, High Sensitivity Result: 65978.4 ng/L (05-03 @ 21:20)  Troponin I, High Sensitivity Result: 74664.8 ng/L (05-03 @ 13:50)                            13.1   7.61  )-----------( 273      ( 05 May 2023 06:50 )             41.1     05-05    141  |  111<H>  |  21<H>  ----------------------------<  100<H>  3.8   |  27  |  1.20    Ca    9.0      05 May 2023 06:50  Phos  3.5     05-04  Mg     2.1     05-04    TPro  6.5  /  Alb  2.9<L>  /  TBili  1.0  /  DBili  x   /  AST  176<H>  /  ALT  207<H>  /  AlkPhos  88  05-05    proBNP:   Lipid Profile: Cholesterol 193  LDL --  HDL 43  TG 59  Ldl calc 138  Ratio --    HgA1c:   TSH: Thyroid Stimulating Hormone, Serum: 1.51 uU/mL (05-03 @ 21:20)      	  PT/INR - ( 03 May 2023 13:50 )   PT: 16.4 sec;   INR: 1.37 ratio         PTT - ( 05 May 2023 00:31 )  PTT:65.6 sec      < from: Transthoracic Echocardiogram (09.22.18 @ 06:45) >  OBSERVATIONS:  Mitral Valve: Normal mitral valve. Moderate to severe  mitral regurgitation.  Aortic Root: Aortic Root: 3.7 cm.    Aortic Valve: Normal trileaflet aortic valve.  Left Atrium: Moderate left atrial enlargement.  Left Ventricle: Mild global left ventricular systolic  dysfunction.40-45% Moderate left ventricular enlargement.  Grade IV diastolic dysfunction.  Right Heart: Normal right atrium. Normal right ventricular  size and function. Normal tricuspid valve. Normalpulmonic  valve.  Pericardium/PleuraNormal pericardium with no pericardial  effusion.  ------------------------------------------------------------------------  CONCLUSIONS:  1. Moderate to severe mitral regurgitation.  2. Moderate left atrial enlargement.  3. Moderate left ventricular enlargement.  4. Mild global left ventricular systolic dysfunction.40-45%  5. Grade IV diastolic dysfunction.    ------------------------------------------------------------------------  Confirmed on  9/22/2018 - 11:57:59 by Ki Jung MD  ------------------------------------------------------------------------    < end of copied text >

## 2023-05-05 NOTE — PROGRESS NOTE ADULT - PROBLEM SELECTOR PLAN 4
- Cr 1.70 on admission  - likely in setting of poor perfusion from rapid Afib   - pt got IV contrast with CTA   - s/p IVF   - Avoid nephrotoxins  - Monitor BMP
- Cr 1.70 on admission  - likely in setting of poor perfusion from rapid Afib   - pt received IV contrast with CTA   - s/p IVF   - Avoid nephrotoxins  - Monitor BMP

## 2023-05-05 NOTE — PROGRESS NOTE ADULT - PROBLEM SELECTOR PLAN 1
p/w 1 week of nausea, fatigue and weakness,  EKG: AFib w. RVR to 170s.  - Trop 43606  - Ddimer 699  - CTA neg for PE   - s/p metop IV x2, and Diltiazem IV dose  - Admit to tele  - S/p cardizem gtt   - C/W heparin gtt per cardio  - TSH WNL  - f/u echo  Consider EP eval for ablation when stable
p/w 1 week of nausea, fatigue and weakness,  EKG: AFib w. RVR to 170s.  - Trop 80902  - Ddimer 699  - CTA neg for PE   - s/p metop IV x2, and Diltiazem IV dose  - Admit to tele  - S/p cardizem gtt   - C/W heparin gtt per cardio  - TSH WNL  - f/u echo  - Will need EP eval for possible ablation when stable

## 2023-05-05 NOTE — PROGRESS NOTE ADULT - PROBLEM SELECTOR PLAN 2
- B/W chest pain & elevated trop 04907, likely demand ischemia in the setting of rapid Afib  - Pt f/u out/pt cardio Dr. Elkins [phone], pt dx with non-ischemic CM 2018 at [facility] and subsequent echo and stress test 2012-no ischemia EF 39%.  - trend trop  - Rest of Plan as above
- B/W chest pain & elevated trop 83015, likely demand ischemia in the setting of rapid Afib  - Pt f/u out/pt cardio Dr. Elkins [phone], pt dx with non-ischemic CM 2018 at [facility] and subsequent echo and stress test 2012-no ischemia EF 39%.  - trend trop  - Rest of Plan as above

## 2023-05-05 NOTE — PROGRESS NOTE ADULT - PROBLEM SELECTOR PLAN 9
- Pt came from home  -Pending improvement in clinical course
- Pt came from home  - Will need cardiac cath and EPS eval for possible cardioversion/ablation when stable.  -Pending improvement in clinical course

## 2023-05-05 NOTE — PROGRESS NOTE ADULT - PROBLEM SELECTOR PLAN 6
- c/w carvedilol   - hold losartan for GREY  - DASH diet
- c/w carvedilol   - hold losartan for GREY  - DASH diet

## 2023-05-05 NOTE — PROGRESS NOTE ADULT - PROBLEM SELECTOR PLAN 3
- Chronic systolic HF  - out/pt stress test 2012-no ischemia EF 39%.  - d/c lasix, Aldactone, and cozaar, start entresto 24/26 per cardio  - Will need cardiac cath and EPS eval for possible cardioversion/ablation when stable.  - Cardio Dr. Jensen on board
- Chronic systolic HF  - out/pt stress test 2012-no ischemia EF 39%.  - d/c Lasix, Aldactone, and cozaar, start Entresto 24/26 per cardio  - F/U Echo  - Will need cardiac cath and EPS eval for possible cardioversion/ablation when stable.  - Cardio Dr. Jensen on board

## 2023-05-06 LAB
ALBUMIN SERPL ELPH-MCNC: 2.5 G/DL — LOW (ref 3.5–5)
ALP SERPL-CCNC: 77 U/L — SIGNIFICANT CHANGE UP (ref 40–120)
ALT FLD-CCNC: 138 U/L DA — HIGH (ref 10–60)
ANION GAP SERPL CALC-SCNC: 5 MMOL/L — SIGNIFICANT CHANGE UP (ref 5–17)
ANION GAP SERPL CALC-SCNC: 7 MMOL/L — SIGNIFICANT CHANGE UP (ref 5–17)
APTT BLD: 120.4 SEC — CRITICAL HIGH (ref 27.5–35.5)
APTT BLD: 87.4 SEC — HIGH (ref 27.5–35.5)
AST SERPL-CCNC: 62 U/L — HIGH (ref 10–40)
BILIRUB SERPL-MCNC: 0.6 MG/DL — SIGNIFICANT CHANGE UP (ref 0.2–1.2)
BUN SERPL-MCNC: 21 MG/DL — HIGH (ref 7–18)
BUN SERPL-MCNC: 22 MG/DL — HIGH (ref 7–18)
CALCIUM SERPL-MCNC: 8.5 MG/DL — SIGNIFICANT CHANGE UP (ref 8.4–10.5)
CALCIUM SERPL-MCNC: 8.6 MG/DL — SIGNIFICANT CHANGE UP (ref 8.4–10.5)
CHLORIDE SERPL-SCNC: 111 MMOL/L — HIGH (ref 96–108)
CHLORIDE SERPL-SCNC: 111 MMOL/L — HIGH (ref 96–108)
CO2 SERPL-SCNC: 23 MMOL/L — SIGNIFICANT CHANGE UP (ref 22–31)
CO2 SERPL-SCNC: 26 MMOL/L — SIGNIFICANT CHANGE UP (ref 22–31)
CREAT SERPL-MCNC: 1.31 MG/DL — HIGH (ref 0.5–1.3)
CREAT SERPL-MCNC: 1.53 MG/DL — HIGH (ref 0.5–1.3)
EGFR: 42 ML/MIN/1.73M2 — LOW
EGFR: 51 ML/MIN/1.73M2 — LOW
GLUCOSE SERPL-MCNC: 110 MG/DL — HIGH (ref 70–99)
GLUCOSE SERPL-MCNC: 163 MG/DL — HIGH (ref 70–99)
HCT VFR BLD CALC: 41.1 % — SIGNIFICANT CHANGE UP (ref 34.5–45)
HGB BLD-MCNC: 13.1 G/DL — SIGNIFICANT CHANGE UP (ref 11.5–15.5)
MAGNESIUM SERPL-MCNC: 3 MG/DL — HIGH (ref 1.6–2.6)
MCHC RBC-ENTMCNC: 31.9 GM/DL — LOW (ref 32–36)
MCHC RBC-ENTMCNC: 32.9 PG — SIGNIFICANT CHANGE UP (ref 27–34)
MCV RBC AUTO: 103.3 FL — HIGH (ref 80–100)
NRBC # BLD: 0 /100 WBCS — SIGNIFICANT CHANGE UP (ref 0–0)
PHOSPHATE SERPL-MCNC: 2.7 MG/DL — SIGNIFICANT CHANGE UP (ref 2.5–4.5)
PLATELET # BLD AUTO: 297 K/UL — SIGNIFICANT CHANGE UP (ref 150–400)
POTASSIUM SERPL-MCNC: 3.6 MMOL/L — SIGNIFICANT CHANGE UP (ref 3.5–5.3)
POTASSIUM SERPL-MCNC: 3.6 MMOL/L — SIGNIFICANT CHANGE UP (ref 3.5–5.3)
POTASSIUM SERPL-SCNC: 3.6 MMOL/L — SIGNIFICANT CHANGE UP (ref 3.5–5.3)
POTASSIUM SERPL-SCNC: 3.6 MMOL/L — SIGNIFICANT CHANGE UP (ref 3.5–5.3)
PROT SERPL-MCNC: 6.1 G/DL — SIGNIFICANT CHANGE UP (ref 6–8.3)
RBC # BLD: 3.98 M/UL — SIGNIFICANT CHANGE UP (ref 3.8–5.2)
RBC # FLD: 15.1 % — HIGH (ref 10.3–14.5)
SODIUM SERPL-SCNC: 141 MMOL/L — SIGNIFICANT CHANGE UP (ref 135–145)
SODIUM SERPL-SCNC: 142 MMOL/L — SIGNIFICANT CHANGE UP (ref 135–145)
WBC # BLD: 7.19 K/UL — SIGNIFICANT CHANGE UP (ref 3.8–10.5)
WBC # FLD AUTO: 7.19 K/UL — SIGNIFICANT CHANGE UP (ref 3.8–10.5)

## 2023-05-06 RX ORDER — METOPROLOL TARTRATE 50 MG
5 TABLET ORAL ONCE
Refills: 0 | Status: COMPLETED | OUTPATIENT
Start: 2023-05-06 | End: 2023-05-06

## 2023-05-06 RX ADMIN — SACUBITRIL AND VALSARTAN 1 TABLET(S): 24; 26 TABLET, FILM COATED ORAL at 06:41

## 2023-05-06 RX ADMIN — SACUBITRIL AND VALSARTAN 1 TABLET(S): 24; 26 TABLET, FILM COATED ORAL at 17:08

## 2023-05-06 RX ADMIN — HEPARIN SODIUM 1200 UNIT(S)/HR: 5000 INJECTION INTRAVENOUS; SUBCUTANEOUS at 07:13

## 2023-05-06 RX ADMIN — HEPARIN SODIUM 1000 UNIT(S)/HR: 5000 INJECTION INTRAVENOUS; SUBCUTANEOUS at 09:08

## 2023-05-06 RX ADMIN — Medication 81 MILLIGRAM(S): at 16:26

## 2023-05-06 RX ADMIN — Medication 5 MILLIGRAM(S): at 01:54

## 2023-05-06 RX ADMIN — CARVEDILOL PHOSPHATE 18.75 MILLIGRAM(S): 80 CAPSULE, EXTENDED RELEASE ORAL at 06:41

## 2023-05-06 RX ADMIN — Medication 125 MICROGRAM(S): at 06:40

## 2023-05-06 RX ADMIN — HEPARIN SODIUM 1000 UNIT(S)/HR: 5000 INJECTION INTRAVENOUS; SUBCUTANEOUS at 17:09

## 2023-05-06 RX ADMIN — CARVEDILOL PHOSPHATE 18.75 MILLIGRAM(S): 80 CAPSULE, EXTENDED RELEASE ORAL at 17:08

## 2023-05-06 RX ADMIN — PANTOPRAZOLE SODIUM 40 MILLIGRAM(S): 20 TABLET, DELAYED RELEASE ORAL at 06:40

## 2023-05-06 RX ADMIN — HEPARIN SODIUM 1000 UNIT(S)/HR: 5000 INJECTION INTRAVENOUS; SUBCUTANEOUS at 19:14

## 2023-05-06 NOTE — PROGRESS NOTE ADULT - ASSESSMENT
Patient with Hx of HTN, CM-2018 here with afib with RVR,Type II MI due to demand ischemia.  1.Tele monitoring.  2.D/W outpatient cardiologist-Dr. Elkins [phone], pt dx with non-ischemic CM 2018 at [facility] and subsequent echo and stress test 2012-no ischemia EF 39%.  3.Afib with RVR-inc coreg 18.75mg bid,,dig,heparin drip.  4.TYpe II MI due to demand ischemia.  5.Echocardiogram.  6.Chronic systolic HF- entresto 24/26.  7.Will need cardiac cath and EPS eval for possible cardioversion/ablation when stable.  8.PPI. Patient with Hx of HTN, CM-2018 here with afib with RVR,Type II MI due to demand ischemia.  1.Tele monitoring.  2.D/W outpatient cardiologist-Dr. Elkins [phone], pt dx with non-ischemic CM 2018 at [facility] and subsequent echo and stress test 2012-no ischemia EF 39%.  3.Afib with RVR-inc coreg 18.75mg bid,,dig,heparin drip.  4.TYpe II MI due to demand ischemia.  5.Echocardiogram.  6.Chronic systolic HF- entresto 24/26.  7.Will need cardiac cath and EPS eval for possible cardioversion/ablation when stable.  8.PPI.  9.D/W pt need for compliance with medication.

## 2023-05-06 NOTE — PROGRESS NOTE ADULT - SUBJECTIVE AND OBJECTIVE BOX
CHIEF COMPLAINT:Patient is a 47y old  Female who presents with a chief complaint of new onset Afib.Pt appears comfortable.    	  REVIEW OF SYSTEMS:  CONSTITUTIONAL: No fever, weight loss, or fatigue  EYES: No eye pain, visual disturbances, or discharge  ENT:  No difficulty hearing, tinnitus, vertigo; No sinus or throat pain  NECK: No pain or stiffness  RESPIRATORY: No cough, wheezing, chills or hemoptysis; No Shortness of Breath  CARDIOVASCULAR: No chest pain, palpitations, passing out, dizziness, or leg swelling  GASTROINTESTINAL: No abdominal or epigastric pain. No nausea, vomiting, or hematemesis; No diarrhea or constipation. No melena or hematochezia.  GENITOURINARY: No dysuria, frequency, hematuria, or incontinence  NEUROLOGICAL: No headaches, memory loss, loss of strength, numbness, or tremors  SKIN: No itching, burning, rashes, or lesions   LYMPH Nodes: No enlarged glands  ENDOCRINE: No heat or cold intolerance; No hair loss  MUSCULOSKELETAL: No joint pain or swelling; No muscle, back, or extremity pain  PSYCHIATRIC: No depression, anxiety, mood swings, or difficulty sleeping  HEME/LYMPH: No easy bruising, or bleeding gums  ALLERGY AND IMMUNOLOGIC: No hives or eczema	        PHYSICAL EXAM:  T(C): 36.8 (05-06-23 @ 07:41), Max: 37.5 (05-05-23 @ 23:43)  HR: 112 (05-06-23 @ 07:41) (99 - 120)  BP: 119/82 (05-06-23 @ 07:41) (112/84 - 134/95)  RR: 20 (05-06-23 @ 07:41) (18 - 20)  SpO2: 98% (05-06-23 @ 07:41) (95% - 98%)  Wt(kg): --  I&O's Summary      Appearance: Normal	  HEENT:   Normal oral mucosa, PERRL, EOMI	  Lymphatic: No lymphadenopathy  Cardiovascular: Normal S1 S2, No JVD, No murmurs, No edema  Respiratory: Lungs clear to auscultation	  Psychiatry: A & O x 3, Mood & affect appropriate  Gastrointestinal:  Soft, Non-tender, + BS	  Skin: No rashes, No ecchymoses, No cyanosis	  Neurologic: Non-focal  Extremities: Normal range of motion, No clubbing, cyanosis or edema  Vascular: Peripheral pulses palpable 2+ bilaterally    MEDICATIONS  (STANDING):  aspirin  chewable 81 milliGRAM(s) Oral daily  carvedilol 18.75 milliGRAM(s) Oral every 12 hours  digoxin     Tablet 125 MICROGram(s) Oral daily  heparin  Infusion.  Unit(s)/Hr (18 mL/Hr) IV Continuous <Continuous>  pantoprazole    Tablet 40 milliGRAM(s) Oral before breakfast  sacubitril 24 mG/valsartan 26 mG 1 Tablet(s) Oral two times a day      TELEMETRY: afib 's	    	  	  LABS:	 	    Troponin I, High Sensitivity Result: 80249.6 ng/L (05-04 @ 07:13)  Troponin I, High Sensitivity Result: 29028.4 ng/L (05-03 @ 21:20)  Troponin I, High Sensitivity Result: 82987.8 ng/L (05-03 @ 13:50)                            13.1   7.19  )-----------( 297      ( 06 May 2023 07:04 )             41.1     05-06    141  |  111<H>  |  21<H>  ----------------------------<  110<H>  3.6   |  23  |  1.31<H>    Ca    8.5      06 May 2023 07:04  Phos  2.7     05-06  Mg     3.0     05-06    TPro  6.1  /  Alb  2.5<L>  /  TBili  0.6  /  DBili  x   /  AST  62<H>  /  ALT  138<H>  /  AlkPhos  77  05-06      Lipid Profile: Cholesterol 193  LDL --  HDL 43  TG 59  Ldl calc 138  Ratio --    HgA1c:   TSH: Thyroid Stimulating Hormone, Serum: 1.51 uU/mL (05-03 @ 21:20)      	  PTT - ( 06 May 2023 07:04 )  PTT:120.4 sec         CHIEF COMPLAINT:Patient is a 47y old  Female who presents with a chief complaint of new onset Afib.Pt appears comfortable.    	  REVIEW OF SYSTEMS:  CONSTITUTIONAL: No fever, weight loss, or fatigue  EYES: No eye pain, visual disturbances, or discharge  ENT:  No difficulty hearing, tinnitus, vertigo; No sinus or throat pain  NECK: No pain or stiffness  RESPIRATORY: No cough, wheezing, chills or hemoptysis; No Shortness of Breath  CARDIOVASCULAR: No chest pain, palpitations, passing out, dizziness, or leg swelling  GASTROINTESTINAL: No abdominal or epigastric pain. No nausea, vomiting, or hematemesis; No diarrhea or constipation. No melena or hematochezia.  GENITOURINARY: No dysuria, frequency, hematuria, or incontinence  NEUROLOGICAL: No headaches, memory loss, loss of strength, numbness, or tremors  SKIN: No itching, burning, rashes, or lesions   LYMPH Nodes: No enlarged glands  ENDOCRINE: No heat or cold intolerance; No hair loss  MUSCULOSKELETAL: No joint pain or swelling; No muscle, back, or extremity pain  PSYCHIATRIC: No depression, anxiety, mood swings, or difficulty sleeping  HEME/LYMPH: No easy bruising, or bleeding gums  ALLERGY AND IMMUNOLOGIC: No hives or eczema	        PHYSICAL EXAM:  T(C): 36.8 (05-06-23 @ 07:41), Max: 37.5 (05-05-23 @ 23:43)  HR: 112 (05-06-23 @ 07:41) (99 - 120)  BP: 119/82 (05-06-23 @ 07:41) (112/84 - 134/95)  RR: 20 (05-06-23 @ 07:41) (18 - 20)  SpO2: 98% (05-06-23 @ 07:41) (95% - 98%)  Wt(kg): --  I&O's Summary      Appearance: Normal	  HEENT:   Normal oral mucosa, PERRL, EOMI	  Lymphatic: No lymphadenopathy  Cardiovascular: Normal S1 S2, No JVD, No murmurs, No edema  Respiratory: Lungs clear to auscultation	  Psychiatry: A & O x 3, Mood & affect appropriate  Gastrointestinal:  Soft, Non-tender, + BS	  Skin: No rashes, No ecchymoses, No cyanosis	  Neurologic: Non-focal  Extremities: Normal range of motion, No clubbing, cyanosis or edema  Vascular: Peripheral pulses palpable 2+ bilaterally    MEDICATIONS  (STANDING):  aspirin  chewable 81 milliGRAM(s) Oral daily  carvedilol 18.75 milliGRAM(s) Oral every 12 hours  digoxin     Tablet 125 MICROGram(s) Oral daily  heparin  Infusion.  Unit(s)/Hr (18 mL/Hr) IV Continuous <Continuous>  pantoprazole    Tablet 40 milliGRAM(s) Oral before breakfast  sacubitril 24 mG/valsartan 26 mG 1 Tablet(s) Oral two times a day      TELEMETRY: afib 's	    	  	  LABS:	 	    Troponin I, High Sensitivity Result: 33278.6 ng/L (05-04 @ 07:13)  Troponin I, High Sensitivity Result: 17811.4 ng/L (05-03 @ 21:20)  Troponin I, High Sensitivity Result: 05893.8 ng/L (05-03 @ 13:50)                            13.1   7.19  )-----------( 297      ( 06 May 2023 07:04 )             41.1     05-06    141  |  111<H>  |  21<H>  ----------------------------<  110<H>  3.6   |  23  |  1.31<H>    Ca    8.5      06 May 2023 07:04  Phos  2.7     05-06  Mg     3.0     05-06    TPro  6.1  /  Alb  2.5<L>  /  TBili  0.6  /  DBili  x   /  AST  62<H>  /  ALT  138<H>  /  AlkPhos  77  05-06      Lipid Profile: Cholesterol 193  LDL --  HDL 43  TG 59  Ldl calc 138  Ratio --    HgA1c:   TSH: Thyroid Stimulating Hormone, Serum: 1.51 uU/mL (05-03 @ 21:20)      	  PTT - ( 06 May 2023 07:04 )  PTT:120.4 sec    < from: Transthoracic Echocardiogram (05.05.23 @ 07:41) >  OBSERVATIONS:  Mitral Valve: Normal mitral valve. Mild to moderate mitral  regurgitation.  Aortic Root: Aortic Root: 3.2 cm.    Aortic Valve: Normal trileaflet aortic valve.  Left Atrium: Severely dilated left atrium.  LA volume index  = 54 cc/m2.  Left Ventricle: Mild global left ventricular systolic  dysfunction. Paradoxical septal motion is seen, consistent  with conduction delay. Normal left ventricular internal  dimensions and wall thicknesses. Unable to adequately  assess diastolic function due to technical aspects of this  study.  Right Heart: Normal right atrium. Normal right ventricular  size and systolic function (TAPSE  1.7cm). There is trace  tricuspid regurgitation. There is trace pulmonic  regurgitation.  Pericardium/PleuraNormal pericardium with no pericardial  effusion.  Hemodynamic: Unable to estimate RVSP.    < end of copied text >

## 2023-05-07 LAB
ALBUMIN SERPL ELPH-MCNC: 2.6 G/DL — LOW (ref 3.5–5)
ALP SERPL-CCNC: 80 U/L — SIGNIFICANT CHANGE UP (ref 40–120)
ALT FLD-CCNC: 121 U/L DA — HIGH (ref 10–60)
ANION GAP SERPL CALC-SCNC: 4 MMOL/L — LOW (ref 5–17)
APTT BLD: 115.9 SEC — HIGH (ref 27.5–35.5)
APTT BLD: 62.4 SEC — HIGH (ref 27.5–35.5)
APTT BLD: 83.2 SEC — HIGH (ref 27.5–35.5)
AST SERPL-CCNC: 54 U/L — HIGH (ref 10–40)
BILIRUB SERPL-MCNC: 0.5 MG/DL — SIGNIFICANT CHANGE UP (ref 0.2–1.2)
BUN SERPL-MCNC: 21 MG/DL — HIGH (ref 7–18)
CALCIUM SERPL-MCNC: 9 MG/DL — SIGNIFICANT CHANGE UP (ref 8.4–10.5)
CHLORIDE SERPL-SCNC: 113 MMOL/L — HIGH (ref 96–108)
CO2 SERPL-SCNC: 24 MMOL/L — SIGNIFICANT CHANGE UP (ref 22–31)
CREAT SERPL-MCNC: 1.12 MG/DL — SIGNIFICANT CHANGE UP (ref 0.5–1.3)
EGFR: 61 ML/MIN/1.73M2 — SIGNIFICANT CHANGE UP
GLUCOSE SERPL-MCNC: 105 MG/DL — HIGH (ref 70–99)
HCT VFR BLD CALC: 40.2 % — SIGNIFICANT CHANGE UP (ref 34.5–45)
HGB BLD-MCNC: 13.2 G/DL — SIGNIFICANT CHANGE UP (ref 11.5–15.5)
MCHC RBC-ENTMCNC: 32.8 GM/DL — SIGNIFICANT CHANGE UP (ref 32–36)
MCHC RBC-ENTMCNC: 33.6 PG — SIGNIFICANT CHANGE UP (ref 27–34)
MCV RBC AUTO: 102.3 FL — HIGH (ref 80–100)
NRBC # BLD: 0 /100 WBCS — SIGNIFICANT CHANGE UP (ref 0–0)
PLATELET # BLD AUTO: 306 K/UL — SIGNIFICANT CHANGE UP (ref 150–400)
POTASSIUM SERPL-MCNC: 4 MMOL/L — SIGNIFICANT CHANGE UP (ref 3.5–5.3)
POTASSIUM SERPL-SCNC: 4 MMOL/L — SIGNIFICANT CHANGE UP (ref 3.5–5.3)
PROT SERPL-MCNC: 6.4 G/DL — SIGNIFICANT CHANGE UP (ref 6–8.3)
RBC # BLD: 3.93 M/UL — SIGNIFICANT CHANGE UP (ref 3.8–5.2)
RBC # FLD: 14.7 % — HIGH (ref 10.3–14.5)
SODIUM SERPL-SCNC: 141 MMOL/L — SIGNIFICANT CHANGE UP (ref 135–145)
WBC # BLD: 6.03 K/UL — SIGNIFICANT CHANGE UP (ref 3.8–10.5)
WBC # FLD AUTO: 6.03 K/UL — SIGNIFICANT CHANGE UP (ref 3.8–10.5)

## 2023-05-07 RX ADMIN — Medication 125 MICROGRAM(S): at 06:12

## 2023-05-07 RX ADMIN — SACUBITRIL AND VALSARTAN 1 TABLET(S): 24; 26 TABLET, FILM COATED ORAL at 06:11

## 2023-05-07 RX ADMIN — CARVEDILOL PHOSPHATE 18.75 MILLIGRAM(S): 80 CAPSULE, EXTENDED RELEASE ORAL at 17:22

## 2023-05-07 RX ADMIN — CARVEDILOL PHOSPHATE 18.75 MILLIGRAM(S): 80 CAPSULE, EXTENDED RELEASE ORAL at 06:13

## 2023-05-07 RX ADMIN — SACUBITRIL AND VALSARTAN 1 TABLET(S): 24; 26 TABLET, FILM COATED ORAL at 17:22

## 2023-05-07 RX ADMIN — HEPARIN SODIUM 800 UNIT(S)/HR: 5000 INJECTION INTRAVENOUS; SUBCUTANEOUS at 19:11

## 2023-05-07 RX ADMIN — PANTOPRAZOLE SODIUM 40 MILLIGRAM(S): 20 TABLET, DELAYED RELEASE ORAL at 06:12

## 2023-05-07 RX ADMIN — HEPARIN SODIUM 800 UNIT(S)/HR: 5000 INJECTION INTRAVENOUS; SUBCUTANEOUS at 07:45

## 2023-05-07 RX ADMIN — HEPARIN SODIUM 800 UNIT(S)/HR: 5000 INJECTION INTRAVENOUS; SUBCUTANEOUS at 00:54

## 2023-05-07 RX ADMIN — HEPARIN SODIUM 800 UNIT(S)/HR: 5000 INJECTION INTRAVENOUS; SUBCUTANEOUS at 07:46

## 2023-05-07 RX ADMIN — HEPARIN SODIUM 800 UNIT(S)/HR: 5000 INJECTION INTRAVENOUS; SUBCUTANEOUS at 10:13

## 2023-05-07 RX ADMIN — Medication 81 MILLIGRAM(S): at 12:26

## 2023-05-07 RX ADMIN — HEPARIN SODIUM 800 UNIT(S)/HR: 5000 INJECTION INTRAVENOUS; SUBCUTANEOUS at 15:29

## 2023-05-07 RX ADMIN — HEPARIN SODIUM 800 UNIT(S)/HR: 5000 INJECTION INTRAVENOUS; SUBCUTANEOUS at 15:17

## 2023-05-07 NOTE — PROGRESS NOTE ADULT - SUBJECTIVE AND OBJECTIVE BOX
CHIEF COMPLAINT:Patient is a 47y old  Female who presents with a chief complaint of new onset [place].Pt appears comfortable.    	  REVIEW OF SYSTEMS:  CONSTITUTIONAL: No fever, weight loss, or fatigue  EYES: No eye pain, visual disturbances, or discharge  ENT:  No difficulty hearing, tinnitus, vertigo; No sinus or throat pain  NECK: No pain or stiffness  RESPIRATORY: No cough, wheezing, chills or hemoptysis; No Shortness of Breath  CARDIOVASCULAR: No chest pain, palpitations, passing out, dizziness, or leg swelling  GASTROINTESTINAL: No abdominal or epigastric pain. No nausea, vomiting, or hematemesis; No diarrhea or constipation. No melena or hematochezia.  GENITOURINARY: No dysuria, frequency, hematuria, or incontinence  NEUROLOGICAL: No headaches, memory loss, loss of strength, numbness, or tremors  SKIN: No itching, burning, rashes, or lesions   LYMPH Nodes: No enlarged glands  ENDOCRINE: No heat or cold intolerance; No hair loss  MUSCULOSKELETAL: No joint pain or swelling; No muscle, back, or extremity pain  PSYCHIATRIC: No depression, anxiety, mood swings, or difficulty sleeping  HEME/LYMPH: No easy bruising, or bleeding gums  ALLERGY AND IMMUNOLOGIC: No hives or eczema	      PHYSICAL EXAM:  T(C): 36.8 (05-07-23 @ 08:04), Max: 37.2 (05-07-23 @ 04:13)  HR: 107 (05-07-23 @ 08:04) (91 - 108)  BP: 135/99 (05-07-23 @ 08:04) (112/90 - 141/87)  RR: 18 (05-07-23 @ 08:04) (18 - 20)  SpO2: 97% (05-07-23 @ 08:04) (96% - 99%)  Wt(kg): --  I&O's Summary      Appearance: Normal	  HEENT:   Normal oral mucosa, PERRL, EOMI	  Lymphatic: No lymphadenopathy  Cardiovascular: Normal S1 S2, No JVD, No murmurs, No edema  Respiratory: Lungs clear to auscultation	  Psychiatry: A & O x 3, Mood & affect appropriate  Gastrointestinal:  Soft, Non-tender, + BS	  Skin: No rashes, No ecchymoses, No cyanosis	  Neurologic: Non-focal  Extremities: Normal range of motion, No clubbing, cyanosis or edema  Vascular: Peripheral pulses palpable 2+ bilaterally    MEDICATIONS  (STANDING):  aspirin  chewable 81 milliGRAM(s) Oral daily  carvedilol 18.75 milliGRAM(s) Oral every 12 hours  digoxin     Tablet 125 MICROGram(s) Oral daily  heparin  Infusion.  Unit(s)/Hr (18 mL/Hr) IV Continuous <Continuous>  pantoprazole    Tablet 40 milliGRAM(s) Oral before breakfast  sacubitril 24 mG/valsartan 26 mG 1 Tablet(s) Oral two times a day      TELEMETRY: afib upto 140's	    	  	  LABS:	 	                                    13.2   6.03  )-----------( 306      ( 07 May 2023 06:33 )             40.2     05-07    141  |  113<H>  |  21<H>  ----------------------------<  105<H>  4.0   |  24  |  1.12    Ca    9.0      07 May 2023 06:33  Phos  2.7     05-06  Mg     3.0     05-06    TPro  6.4  /  Alb  2.6<L>  /  TBili  0.5  /  DBili  x   /  AST  54<H>  /  ALT  121<H>  /  AlkPhos  80  05-07    proBNP:   Lipid Profile: Cholesterol 193  LDL --  HDL 43  TG 59  Ldl calc 138  Ratio --    HgA1c:   TSH: Thyroid Stimulating Hormone, Serum: 1.51 uU/mL (05-03 @ 21:20)      	      PTT - ( 07 May 2023 06:44 )  PTT:83.2 sec

## 2023-05-07 NOTE — PROGRESS NOTE ADULT - ASSESSMENT
Patient with Hx of HTN, CM-2018 here with afib with RVR,Type II MI due to demand ischemia.  1.Tele monitoring.  2.D/W outpatient cardiologist-Dr. Elkins [phone], pt dx with non-ischemic CM 2018 at [facility] and subsequent echo and stress test 2012-no ischemia EF 39%.  3.Afib with RVR- coreg 18.75mg bid,,dig,heparin drip.  4.TYpe II MI due to demand ischemia.  5.Echocardiogram.  6.Chronic systolic HF- entresto 24/26.  7.Cardiac cath in am, followed by EPS eval for possible cardioversion/ablation when stable.  8.PPI.  9.D/W pt need for compliance with medication.

## 2023-05-08 ENCOUNTER — INPATIENT (INPATIENT)
Facility: HOSPITAL | Age: 48
LOS: 0 days | Discharge: HOME | End: 2023-05-09
Attending: HOSPITALIST | Admitting: HOSPITALIST
Payer: MEDICAID

## 2023-05-08 VITALS
TEMPERATURE: 98 F | SYSTOLIC BLOOD PRESSURE: 138 MMHG | DIASTOLIC BLOOD PRESSURE: 93 MMHG | RESPIRATION RATE: 19 BRPM | OXYGEN SATURATION: 97 % | HEART RATE: 60 BPM

## 2023-05-08 VITALS
HEART RATE: 108 BPM | DIASTOLIC BLOOD PRESSURE: 112 MMHG | OXYGEN SATURATION: 99 % | TEMPERATURE: 99 F | RESPIRATION RATE: 18 BRPM | SYSTOLIC BLOOD PRESSURE: 151 MMHG

## 2023-05-08 DIAGNOSIS — I48.91 UNSPECIFIED ATRIAL FIBRILLATION: ICD-10-CM

## 2023-05-08 LAB
ALBUMIN SERPL ELPH-MCNC: 2.8 G/DL — LOW (ref 3.5–5)
ALP SERPL-CCNC: 79 U/L — SIGNIFICANT CHANGE UP (ref 40–120)
ALT FLD-CCNC: 117 U/L DA — HIGH (ref 10–60)
ANION GAP SERPL CALC-SCNC: 1 MMOL/L — LOW (ref 5–17)
APTT BLD: 62.4 SEC — HIGH (ref 27.5–35.5)
AST SERPL-CCNC: 56 U/L — HIGH (ref 10–40)
BILIRUB SERPL-MCNC: 0.5 MG/DL — SIGNIFICANT CHANGE UP (ref 0.2–1.2)
BUN SERPL-MCNC: 22 MG/DL — HIGH (ref 7–18)
CALCIUM SERPL-MCNC: 9.3 MG/DL — SIGNIFICANT CHANGE UP (ref 8.4–10.5)
CHLORIDE SERPL-SCNC: 112 MMOL/L — HIGH (ref 96–108)
CO2 SERPL-SCNC: 28 MMOL/L — SIGNIFICANT CHANGE UP (ref 22–31)
CREAT SERPL-MCNC: 1.16 MG/DL — SIGNIFICANT CHANGE UP (ref 0.5–1.3)
EGFR: 59 ML/MIN/1.73M2 — LOW
GLUCOSE SERPL-MCNC: 97 MG/DL — SIGNIFICANT CHANGE UP (ref 70–99)
HCT VFR BLD CALC: 40.9 % — SIGNIFICANT CHANGE UP (ref 34.5–45)
HGB BLD-MCNC: 13.1 G/DL — SIGNIFICANT CHANGE UP (ref 11.5–15.5)
MCHC RBC-ENTMCNC: 32 GM/DL — SIGNIFICANT CHANGE UP (ref 32–36)
MCHC RBC-ENTMCNC: 33.3 PG — SIGNIFICANT CHANGE UP (ref 27–34)
MCV RBC AUTO: 104.1 FL — HIGH (ref 80–100)
NRBC # BLD: 0 /100 WBCS — SIGNIFICANT CHANGE UP (ref 0–0)
PLATELET # BLD AUTO: 285 K/UL — SIGNIFICANT CHANGE UP (ref 150–400)
POTASSIUM SERPL-MCNC: 4.3 MMOL/L — SIGNIFICANT CHANGE UP (ref 3.5–5.3)
POTASSIUM SERPL-SCNC: 4.3 MMOL/L — SIGNIFICANT CHANGE UP (ref 3.5–5.3)
PROT SERPL-MCNC: 6.5 G/DL — SIGNIFICANT CHANGE UP (ref 6–8.3)
RBC # BLD: 3.93 M/UL — SIGNIFICANT CHANGE UP (ref 3.8–5.2)
RBC # FLD: 14.9 % — HIGH (ref 10.3–14.5)
SODIUM SERPL-SCNC: 141 MMOL/L — SIGNIFICANT CHANGE UP (ref 135–145)
WBC # BLD: 5.63 K/UL — SIGNIFICANT CHANGE UP (ref 3.8–10.5)
WBC # FLD AUTO: 5.63 K/UL — SIGNIFICANT CHANGE UP (ref 3.8–10.5)

## 2023-05-08 PROCEDURE — 93306 TTE W/DOPPLER COMPLETE: CPT

## 2023-05-08 PROCEDURE — 80061 LIPID PANEL: CPT

## 2023-05-08 PROCEDURE — 80053 COMPREHEN METABOLIC PANEL: CPT

## 2023-05-08 PROCEDURE — 82728 ASSAY OF FERRITIN: CPT

## 2023-05-08 PROCEDURE — 36415 COLL VENOUS BLD VENIPUNCTURE: CPT

## 2023-05-08 PROCEDURE — 85027 COMPLETE CBC AUTOMATED: CPT

## 2023-05-08 PROCEDURE — 94640 AIRWAY INHALATION TREATMENT: CPT

## 2023-05-08 PROCEDURE — 85379 FIBRIN DEGRADATION QUANT: CPT

## 2023-05-08 PROCEDURE — 99152 MOD SED SAME PHYS/QHP 5/>YRS: CPT

## 2023-05-08 PROCEDURE — 96374 THER/PROPH/DIAG INJ IV PUSH: CPT

## 2023-05-08 PROCEDURE — 96376 TX/PRO/DX INJ SAME DRUG ADON: CPT

## 2023-05-08 PROCEDURE — 96375 TX/PRO/DX INJ NEW DRUG ADDON: CPT

## 2023-05-08 PROCEDURE — 82553 CREATINE MB FRACTION: CPT

## 2023-05-08 PROCEDURE — 87389 HIV-1 AG W/HIV-1&-2 AB AG IA: CPT

## 2023-05-08 PROCEDURE — 82550 ASSAY OF CK (CPK): CPT

## 2023-05-08 PROCEDURE — 99285 EMERGENCY DEPT VISIT HI MDM: CPT | Mod: 25

## 2023-05-08 PROCEDURE — 93005 ELECTROCARDIOGRAM TRACING: CPT

## 2023-05-08 PROCEDURE — 71045 X-RAY EXAM CHEST 1 VIEW: CPT

## 2023-05-08 PROCEDURE — 71275 CT ANGIOGRAPHY CHEST: CPT | Mod: MA

## 2023-05-08 PROCEDURE — 85025 COMPLETE CBC W/AUTO DIFF WBC: CPT

## 2023-05-08 PROCEDURE — 87635 SARS-COV-2 COVID-19 AMP PRB: CPT

## 2023-05-08 PROCEDURE — 85730 THROMBOPLASTIN TIME PARTIAL: CPT

## 2023-05-08 PROCEDURE — 84100 ASSAY OF PHOSPHORUS: CPT

## 2023-05-08 PROCEDURE — 82962 GLUCOSE BLOOD TEST: CPT

## 2023-05-08 PROCEDURE — 83036 HEMOGLOBIN GLYCOSYLATED A1C: CPT

## 2023-05-08 PROCEDURE — 84443 ASSAY THYROID STIM HORMONE: CPT

## 2023-05-08 PROCEDURE — 99232 SBSQ HOSP IP/OBS MODERATE 35: CPT

## 2023-05-08 PROCEDURE — 80048 BASIC METABOLIC PNL TOTAL CA: CPT

## 2023-05-08 PROCEDURE — 85610 PROTHROMBIN TIME: CPT

## 2023-05-08 PROCEDURE — 83735 ASSAY OF MAGNESIUM: CPT

## 2023-05-08 PROCEDURE — 84702 CHORIONIC GONADOTROPIN TEST: CPT

## 2023-05-08 PROCEDURE — 93010 ELECTROCARDIOGRAM REPORT: CPT

## 2023-05-08 PROCEDURE — 84484 ASSAY OF TROPONIN QUANT: CPT

## 2023-05-08 PROCEDURE — 93458 L HRT ARTERY/VENTRICLE ANGIO: CPT | Mod: 26

## 2023-05-08 RX ORDER — SACUBITRIL AND VALSARTAN 24; 26 MG/1; MG/1
1 TABLET, FILM COATED ORAL
Refills: 0 | Status: DISCONTINUED | OUTPATIENT
Start: 2023-05-09 | End: 2023-05-09

## 2023-05-08 RX ORDER — HEPARIN SODIUM 5000 [USP'U]/ML
8000 INJECTION INTRAVENOUS; SUBCUTANEOUS EVERY 6 HOURS
Refills: 0 | Status: DISCONTINUED | OUTPATIENT
Start: 2023-05-08 | End: 2023-05-08

## 2023-05-08 RX ORDER — CARVEDILOL PHOSPHATE 80 MG/1
18.75 CAPSULE, EXTENDED RELEASE ORAL EVERY 12 HOURS
Refills: 0 | Status: DISCONTINUED | OUTPATIENT
Start: 2023-05-08 | End: 2023-05-09

## 2023-05-08 RX ORDER — DIGOXIN 250 MCG
1 TABLET ORAL
Qty: 0 | Refills: 0 | DISCHARGE
Start: 2023-05-08

## 2023-05-08 RX ORDER — HEPARIN SODIUM 5000 [USP'U]/ML
1400 INJECTION INTRAVENOUS; SUBCUTANEOUS
Qty: 25000 | Refills: 0 | Status: DISCONTINUED | OUTPATIENT
Start: 2023-05-08 | End: 2023-05-08

## 2023-05-08 RX ORDER — ACETAMINOPHEN 500 MG
2 TABLET ORAL
Qty: 0 | Refills: 0 | DISCHARGE
Start: 2023-05-08

## 2023-05-08 RX ORDER — APIXABAN 2.5 MG/1
5 TABLET, FILM COATED ORAL
Refills: 0 | Status: DISCONTINUED | OUTPATIENT
Start: 2023-05-09 | End: 2023-05-09

## 2023-05-08 RX ORDER — ASPIRIN/CALCIUM CARB/MAGNESIUM 324 MG
81 TABLET ORAL DAILY
Refills: 0 | Status: DISCONTINUED | OUTPATIENT
Start: 2023-05-08 | End: 2023-05-09

## 2023-05-08 RX ORDER — SACUBITRIL AND VALSARTAN 24; 26 MG/1; MG/1
1 TABLET, FILM COATED ORAL
Qty: 0 | Refills: 0 | DISCHARGE
Start: 2023-05-08

## 2023-05-08 RX ORDER — ACETAMINOPHEN 500 MG
1000 TABLET ORAL ONCE
Refills: 0 | Status: DISCONTINUED | OUTPATIENT
Start: 2023-05-08 | End: 2023-05-08

## 2023-05-08 RX ORDER — HEPARIN SODIUM 5000 [USP'U]/ML
800 INJECTION INTRAVENOUS; SUBCUTANEOUS
Qty: 25000 | Refills: 0 | Status: DISCONTINUED | OUTPATIENT
Start: 2023-05-08 | End: 2023-05-09

## 2023-05-08 RX ORDER — ALBUTEROL 90 UG/1
2 AEROSOL, METERED ORAL EVERY 6 HOURS
Refills: 0 | Status: DISCONTINUED | OUTPATIENT
Start: 2023-05-08 | End: 2023-05-09

## 2023-05-08 RX ORDER — PANTOPRAZOLE SODIUM 20 MG/1
40 TABLET, DELAYED RELEASE ORAL
Refills: 0 | Status: DISCONTINUED | OUTPATIENT
Start: 2023-05-09 | End: 2023-05-09

## 2023-05-08 RX ORDER — PANTOPRAZOLE SODIUM 20 MG/1
1 TABLET, DELAYED RELEASE ORAL
Qty: 0 | Refills: 0 | DISCHARGE
Start: 2023-05-08

## 2023-05-08 RX ORDER — HEPARIN SODIUM 5000 [USP'U]/ML
4000 INJECTION INTRAVENOUS; SUBCUTANEOUS EVERY 6 HOURS
Refills: 0 | Status: DISCONTINUED | OUTPATIENT
Start: 2023-05-08 | End: 2023-05-09

## 2023-05-08 RX ORDER — HEPARIN SODIUM 5000 [USP'U]/ML
4000 INJECTION INTRAVENOUS; SUBCUTANEOUS EVERY 6 HOURS
Refills: 0 | Status: DISCONTINUED | OUTPATIENT
Start: 2023-05-08 | End: 2023-05-08

## 2023-05-08 RX ORDER — HEPARIN SODIUM 5000 [USP'U]/ML
8000 INJECTION INTRAVENOUS; SUBCUTANEOUS EVERY 6 HOURS
Refills: 0 | Status: DISCONTINUED | OUTPATIENT
Start: 2023-05-08 | End: 2023-05-09

## 2023-05-08 RX ORDER — ACETAMINOPHEN 500 MG
650 TABLET ORAL EVERY 6 HOURS
Refills: 0 | Status: DISCONTINUED | OUTPATIENT
Start: 2023-05-08 | End: 2023-05-08

## 2023-05-08 RX ORDER — LANOLIN ALCOHOL/MO/W.PET/CERES
1 CREAM (GRAM) TOPICAL
Qty: 0 | Refills: 0 | DISCHARGE
Start: 2023-05-08

## 2023-05-08 RX ORDER — HEPARIN SODIUM 5000 [USP'U]/ML
18 INJECTION INTRAVENOUS; SUBCUTANEOUS
Qty: 0 | Refills: 0 | DISCHARGE
Start: 2023-05-08

## 2023-05-08 RX ADMIN — HEPARIN SODIUM 800 UNIT(S)/HR: 5000 INJECTION INTRAVENOUS; SUBCUTANEOUS at 23:36

## 2023-05-08 RX ADMIN — Medication 125 MICROGRAM(S): at 06:41

## 2023-05-08 RX ADMIN — HEPARIN SODIUM 800 UNIT(S)/HR: 5000 INJECTION INTRAVENOUS; SUBCUTANEOUS at 07:10

## 2023-05-08 RX ADMIN — HEPARIN SODIUM 800 UNIT(S)/HR: 5000 INJECTION INTRAVENOUS; SUBCUTANEOUS at 07:12

## 2023-05-08 RX ADMIN — Medication 650 MILLIGRAM(S): at 11:04

## 2023-05-08 RX ADMIN — SACUBITRIL AND VALSARTAN 1 TABLET(S): 24; 26 TABLET, FILM COATED ORAL at 21:02

## 2023-05-08 RX ADMIN — Medication 81 MILLIGRAM(S): at 21:01

## 2023-05-08 RX ADMIN — PANTOPRAZOLE SODIUM 40 MILLIGRAM(S): 20 TABLET, DELAYED RELEASE ORAL at 06:40

## 2023-05-08 RX ADMIN — SACUBITRIL AND VALSARTAN 1 TABLET(S): 24; 26 TABLET, FILM COATED ORAL at 06:40

## 2023-05-08 RX ADMIN — CARVEDILOL PHOSPHATE 18.75 MILLIGRAM(S): 80 CAPSULE, EXTENDED RELEASE ORAL at 06:41

## 2023-05-08 RX ADMIN — CARVEDILOL PHOSPHATE 18.75 MILLIGRAM(S): 80 CAPSULE, EXTENDED RELEASE ORAL at 21:01

## 2023-05-08 NOTE — TRANSFER ACCEPTANCE NOTE - NSICDXPASTMEDICALHX_GEN_ALL_CORE_FT
PAST MEDICAL HISTORY:  Asthma     Chronic systolic congestive heart failure     H/O cardiomyopathy     H/O eczema     Hypertension     Noncompliance for HTN medication for years

## 2023-05-08 NOTE — TRANSFER ACCEPTANCE NOTE - NEGATIVE NEUROLOGICAL SYMPTOMS
no weakness/no generalized seizures/no focal seizures/no syncope/no headache/no loss of consciousness

## 2023-05-08 NOTE — CONSULT NOTE ADULT - ASSESSMENT
47-year-old female hx of HTN and asthma p/w 2 weeks of nausea, fatigue and generalized weakness, shortly after a vacation in [place] with recreational alcohol binging. She has been transferred over to [facility] for a diagnostic LHC in the setting of NSTEMI (without EKG changes or chest pain) w/ HFrEF (negative prior stress test but never angiogram), and atrial fibrillation of unknown duration.    Impression:  CHF with systolic dysfunction, EF 41%  NSTEMI (hstrop I to 24,937), suspect demand in the setting of AF w/ RVR   Afib ([id number] = 3) w/ RVR   HTN, HLD     Plan:  - Continue on hep gtt for now prior to cath, can transition to DOAC thereafter  - OSH TSH WNL   - Patient's GDMT includes entresto 24/26 bid, carvedilol. Can consider outpatient SGLT2i  - Would discontinue digoxin and optimize rate control with uptitration of carvedilol (max 25mg bid)  - WIll discuss with EP team over TROY/DCCV vs. ablation procedure

## 2023-05-08 NOTE — PROGRESS NOTE ADULT - REASON FOR ADMISSION
new onset Afib

## 2023-05-08 NOTE — TRANSFER ACCEPTANCE NOTE - ASSESSMENT
The patient was transferred to [facility] today for cardiac catheterization, possbly DCCV vs Ablation.  Risks and benefits are explained. Consent is signed and is in the patient's chart.

## 2023-05-08 NOTE — CONSULT NOTE ADULT - SUBJECTIVE AND OBJECTIVE BOX
CARDIOLOGY FELLOW CONSULT NOTE    HPI:  47-year-old female hx of HTN and asthma, prior known HFrEF (~40%) p/w 2 weeks of nausea, fatigue and generalized weakness. Patient states that she recently got back from a trip to [place] 2 weeks (vacation in [place]) and since she has been back she had general malaise. Pt thought she caught a virus however symptoms have been getting progressively worse. Pt did not notice chest pain or palpitations but noted to be more short of breath when exerting herself. Patient mentions that she drank more than usual during this time as she was on vacation but denies use of other substances. Pt denies any other complaints including fever/chills, headache, dizziness, cough/hemoptysis, n/v/d/c, dysuria or leg swelling. NKDA.  She does follow with an outpatient cardiologist (Dr. Elkins @ [place] -[phone]), pt dx with non-ischemic CM 2018 at [facility] and subsequent echo and stress test 2012-no ischemia EF 39%.    OSH events:   NSTEMI (hstrop I 38432, d-dimer 699, CTA negative for PE), new Afib (unknown duration - managed with IV metop, dilt, cardizem gtt), transaminitis, GREY  TTE w/ EF 41% (otherwise normal trileaflet valve, MELIDA 54cc/m2, mild global LVSystolic dysfunction with paradoxical septal motion, normal RH function, trace TR, trace NC.   Also with hypertension (140s/120s)    INterval Events:  Arrived to [facility] for diagnostic LHC. HR 80s-110s, /120. She is on heparin gtt, carvedilol, digoxin 125mcg daily, entresto 24/26bid  Also on aspirin 81mg, pantoprazole 40mg daily, albuterol HFA PRN   Patient is in no acute distress sitting at 30degrees HOB elevation. ROS + for right ankle pain (mechanical trauma overnight ambulating to the restroom) but denies any PND or orthopnea, chest pain or palpitations, lightheadedness. She is accompanied by her sister who said that she has apnea events and snoring overnight. Never has been evaluated for CHRISTIN w/ pulm, does not have a CPAP. Aside from that, with about a bottle of wine daily when in [place] for vacation a couple weeks ago. Daily caffeine use (last was last Friday)    Labs this AM: 5.63 < 13.1 .1 > 285  141/4.3 | 112/28 | 22/1.16 < 97  56/117 | 79 | 0.5 | 6.5/2.8  aPTT 62.4, 62.4, 83.2   A1c 5.7    PMHx:   Hypertension  Asthma  Noncompliance    PSHx:   No significant past surgical history    Allergies:  penicillin (Other)    FAMILY HISTORY:  FH: heart disease    Social History: Recent [place] vacation with alcohol binge drinking. REmote marijuana gummies. Remote smoking history. Otherwise no illicits     REVIEW OF SYSTEMS: 14pt ros neg unless stated above     Physical Exam:  T(F): 98.2 (05-08), Max: 98.4 (05-08)  HR: 60 (05-08) (53 - 115)  BP: 138/93 (05-08) (114/85 - 138/93)  RR: 19 (05-08)  SpO2: 97% (05-08)  GENERAL: No acute distress, well-developed. Obese. Convserational  HEAD:  Atraumatic, Normocephalic  ENT: EOMI, PERRLA, conjunctiva and sclera clear, Neck supple, No JVD, moist mucosa  CHEST/LUNG: Clear to auscultation bilaterally; No wheeze, equal breath sounds bilaterally   BACK: No spinal tenderness  HEART: Regular rate and rhythm; No murmurs, rubs, or gallops  ABDOMEN: Soft, Nontender, Nondistended; Bowel sounds present  EXTREMITIES:  No clubbing, cyanosis, or edema  PSYCH: Nl behavior, nl affect  NEUROLOGY: AAOx3, non-focal, cranial nerves intact  SKIN: Normal color, No rashes or lesions    EKG: atrial fibrillation, left axis deviation, PVC, nonspecific ST flattening, HR 92     CXR 5/3/23 Cardiomegaly.Mild bilateral RIGHT greater than LEFT diffuse airspace disease.    Echo 5/5/23Ejection Fraction Visual Estimate: 40-45 %  Ejection Fraction Arreola: 41 %  1. Normal mitral valve. Mild to moderate mitral  regurgitation.  2. Normal trileaflet aortic valve.  3. Aortic Root: 3.2 cm.  4. Severely dilated left atrium.  LA volume index = 54  cc/m2.  5. Normal left ventricular internal dimensions and wall  thicknesses.  6. Mild global left ventricular systolic dysfunction.  Paradoxical septal motion is seen, consistent with  conduction delay.  7. Unable to adequately assess diastolic function due to  technical aspects of this study.  8. Normal right atrium.  9. Normal right ventricular size and systolic function  (TAPSE  1.7cm).  10. Unable to estimate RVSP.  11. There is trace tricuspid regurgitation.  12. There is trace pulmonic regurgitation.  13. Normal pericardium with no pericardial effusion.    CTPA 5/3/23 1.  No pulmonary embolism, however, the mid to distal segmental and   subsegmental divisions are not well evaluated due to respiratory motion.  2.  Right pleural effusion, septal thickening, groundglass opacities and   patchy consolidation can occur in the clinical setting of pulmonary edema.  3.  Cardiomegaly.                        13.1   5.63  )-----------( 285      ( 08 May 2023 06:00 )             40.9     05-08    141  |  112<H>  |  22<H>  ----------------------------<  97  4.3   |  28  |  1.16    Ca    9.3      08 May 2023 06:00    TPro  6.5  /  Alb  2.8<L>  /  TBili  0.5  /  DBili  x   /  AST  56<H>  /  ALT  117<H>  /  AlkPhos  79  05-08    PTT - ( 08 May 2023 06:00 )  PTT:62.4 sec    CARDIAC MARKERS ( 03 May 2023 21:20 )  x     / x     / x     / 611 U/L / x     / 48.3 ng/mL    Thyroid Stimulating Hormone, Serum: 1.51 uU/mL (05-03 @ 21:20)

## 2023-05-08 NOTE — TRANSFER ACCEPTANCE NOTE - NSICDXFAMILYHX_GEN_ALL_CORE_FT
FAMILY HISTORY:  FH: heart disease    Father  Still living? No  FH: HTN (hypertension), Age at diagnosis: Age Unknown  FHx: type 2 diabetes mellitus, Age at diagnosis: Age Unknown

## 2023-05-08 NOTE — ACUTE INTERFACILITY TRANSFER NOTE - HOSPITAL COURSE
47-year-old female hx of HTN and asthma p/w 2 weeks of nausea, fatigue and generalized weakness. Patient states that she recently got back from a trip to [place] 2 weeks and since she has been back she had general malaise. Pt thought she caught a virus however symptoms have been getting progressively worse. Pt did not notice chest pain or palpitations but noted to be more short of breath when exerting herself. Patient mentions that she drank more than usual during this time as she was on vacation but denies use of other substances. Pt denies any other complaints including fever/chills, headache, dizziness, cough/hemoptysis, n/v/d/c, dysuria or leg swelling. NKDA.

## 2023-05-08 NOTE — PROGRESS NOTE ADULT - SUBJECTIVE AND OBJECTIVE BOX
CHIEF COMPLAINT:Patient is a 47y old  Female who presents with a chief complaint of new onset Afib.Pt appears comfortable.    	  REVIEW OF SYSTEMS:  CONSTITUTIONAL: No fever, weight loss, or fatigue  EYES: No eye pain, visual disturbances, or discharge  ENT:  No difficulty hearing, tinnitus, vertigo; No sinus or throat pain  NECK: No pain or stiffness  RESPIRATORY: No cough, wheezing, chills or hemoptysis; No Shortness of Breath  CARDIOVASCULAR: No chest pain, palpitations, passing out, dizziness, or leg swelling  GASTROINTESTINAL: No abdominal or epigastric pain. No nausea, vomiting, or hematemesis; No diarrhea or constipation. No melena or hematochezia.  GENITOURINARY: No dysuria, frequency, hematuria, or incontinence  NEUROLOGICAL: No headaches, memory loss, loss of strength, numbness, or tremors  SKIN: No itching, burning, rashes, or lesions   LYMPH Nodes: No enlarged glands  ENDOCRINE: No heat or cold intolerance; No hair loss  MUSCULOSKELETAL: No joint pain or swelling; No muscle, back, or extremity pain  PSYCHIATRIC: No depression, anxiety, mood swings, or difficulty sleeping  HEME/LYMPH: No easy bruising, or bleeding gums  ALLERGY AND IMMUNOLOGIC: No hives or eczema	        PHYSICAL EXAM:  T(C): 36.6 (05-08-23 @ 07:39), Max: 36.9 (05-08-23 @ 04:40)  HR: 115 (05-08-23 @ 07:39) (53 - 115)  BP: 134/99 (05-08-23 @ 07:39) (114/85 - 138/93)  RR: 18 (05-08-23 @ 07:39) (18 - 20)  SpO2: 97% (05-08-23 @ 07:39) (96% - 99%)  Wt(kg): --  I&O's Summary    07 May 2023 07:01  -  08 May 2023 07:00  --------------------------------------------------------  IN: 80 mL / OUT: 0 mL / NET: 80 mL        Appearance: Normal	  HEENT:   Normal oral mucosa, PERRL, EOMI	  Lymphatic: No lymphadenopathy  Cardiovascular: Normal S1 S2, No JVD, No murmurs, No edema  Respiratory: Lungs clear to auscultation	  Psychiatry: A & O x 3, Mood & affect appropriate  Gastrointestinal:  Soft, Non-tender, + BS	  Skin: No rashes, No ecchymoses, No cyanosis	  Neurologic: Non-focal  Extremities: Normal range of motion, No clubbing, cyanosis or edema  Vascular: Peripheral pulses palpable 2+ bilaterally    MEDICATIONS  (STANDING):  aspirin  chewable 81 milliGRAM(s) Oral daily  carvedilol 18.75 milliGRAM(s) Oral every 12 hours  digoxin     Tablet 125 MICROGram(s) Oral daily  heparin  Infusion.  Unit(s)/Hr (18 mL/Hr) IV Continuous <Continuous>  pantoprazole    Tablet 40 milliGRAM(s) Oral before breakfast  sacubitril 24 mG/valsartan 26 mG 1 Tablet(s) Oral two times a day      TELEMETRY: 	afib 90's      	  LABS:	 	                        13.1   5.63  )-----------( 285      ( 08 May 2023 06:00 )             40.9     05-08    141  |  112<H>  |  22<H>  ----------------------------<  97  4.3   |  28  |  1.16    Ca    9.3      08 May 2023 06:00    TPro  6.5  /  Alb  2.8<L>  /  TBili  0.5  /  DBili  x   /  AST  56<H>  /  ALT  117<H>  /  AlkPhos  79  05-08    proBNP:   Lipid Profile: Cholesterol 193  LDL --  HDL 43  TG 59  Ldl calc 138  Ratio --    HgA1c:   TSH: Thyroid Stimulating Hormone, Serum: 1.51 uU/mL (05-03 @ 21:20)      	    PTT - ( 08 May 2023 06:00 )  PTT:62.4 sec

## 2023-05-08 NOTE — PROGRESS NOTE ADULT - ASSESSMENT
Patient with Hx of HTN, CM-2018 here with afib with RVR,Type II MI due to demand ischemia.  1.Tele monitoring.  2.D/W outpatient cardiologist-Dr. Elkins [phone], pt dx with non-ischemic CM 2018 at [facility] and subsequent echo and stress test 2012-no ischemia EF 39%.  3.Afib with RVR- coreg 18.75mg bid,,dig,heparin drip.  4.TYpe II MI due to demand ischemia.  5.Echocardiogram.  6.Chronic systolic HF- entresto 24/26.  7.Cardiac cath today, followed by EPS eval for possible cardioversion/ablation when stable.  8.PPI.  9.D/W pt need for compliance with medication.

## 2023-05-08 NOTE — TRANSFER ACCEPTANCE NOTE - HISTORY OF PRESENT ILLNESS
47-year-old female hx of HTN and asthma p/w 2 weeks of nausea, fatigue and generalized weakness. Patient states that she recently got back from a trip to [place] 2 weeks and since she has been back she had general malaise. Pt thought she caught a virus however symptoms have been getting progressively worse. Pt did not notice chest pain or palpitations but noted to be more short of breath when exerting herself. Patient mentions that she drank more than usual during this time as she was on vacation but denies use of other substances. Pt denies any other complaints including fever/chills, headache, dizziness, cough/hemoptysis, n/v/d/c, dysuria or leg swelling. NKDA.    CXR 5/3/23 Cardiomegaly.Mild bilateral RIGHT greater than LEFT diffuse airspace disease.    Echo 5/5/23Ejection Fraction Visual Estimate: 40-45 %  Ejection Fraction Arreola: 41 %  1. Normal mitral valve. Mild to moderate mitral  regurgitation.  2. Normal trileaflet aortic valve.  3. Aortic Root: 3.2 cm.  4. Severely dilated left atrium.  LA volume index = 54  cc/m2.  5. Normal left ventricular internal dimensions and wall  thicknesses.  6. Mild global left ventricular systolic dysfunction.  Paradoxical septal motion is seen, consistent with  conduction delay.  7. Unable to adequately assess diastolic function due to  technical aspects of this study.  8. Normal right atrium.  9. Normal right ventricular size and systolic function  (TAPSE  1.7cm).  10. Unable to estimate RVSP.  11. There is trace tricuspid regurgitation.  12. There is trace pulmonic regurgitation.  13. Normal pericardium with no pericardial effusion.    CTPA 5/3/23 1.  No pulmonary embolism, however, the mid to distal segmental and   subsegmental divisions are not well evaluated due to respiratory motion.  2.  Right pleural effusion, septal thickening, groundglass opacities and   patchy consolidation can occur in the clinical setting of pulmonary edema.  3.  Cardiomegaly.              Problem: New onset atrial fibrillation.   ·  Plan: p/w 1 week of nausea, fatigue and weakness,  EKG: AFib w. RVR to 170s.  - Trop 35271  - Ddimer 699  - CTA neg for PE   - s/p metop IV x2, and Diltiazem IV dose  - Admit to tele  - S/p cardizem gtt   - C/W heparin gtt per cardio  - TSH WNL  - f/u echo  - Will need EP eval for possible ablation when stable.     Problem/Plan - 2:  ·  Problem: Non-ST elevation MI (NSTEMI).   ·  Plan: - B/W chest pain & elevated trop 86217, likely demand ischemia in the setting of rapid Afib  - Pt f/u out/pt cardio Dr. Elkins [phone], pt dx with non-ischemic CM 2018 at [facility] and subsequent echo and stress test 2012-no ischemia EF 39%.  - trend trop  - Rest of Plan as above.     Problem/Plan - 3:  ·  Problem: Chronic systolic congestive heart failure.   ·  Plan: - Chronic systolic HF  - out/pt stress test 2012-no ischemia EF 39%.  - d/c Lasix, Aldactone, and cozaar, start Entresto 24/26 per cardio  - F/U Echo  - Will need cardiac cath and EPS eval for possible cardioversion/ablation when stable.  - Cardio Dr. Jensen on board.     Problem/Plan - 4:  ·  Problem: GREY (acute kidney injury).   ·  Plan: - Cr 1.70 on admission  - likely in setting of poor perfusion from rapid Afib   - pt received IV contrast with CTA   - s/p IVF   - Avoid nephrotoxins  - Monitor BMP.     Problem/Plan - 5:  ·  Problem: Transaminitis.   ·  Plan: - likely in setting of rapid Afib, 2/2 poor perfusion   - f/u abdominal US.     Problem/Plan - 6:  ·  Problem: HTN (hypertension).   ·  Plan: - c/w carvedilol   - hold losartan for GREY  - DASH diet.     Problem/Plan - 7:  ·  Problem: Asthma.   ·  Plan: - Not in exacerbation  - c/w albuterol PRN.   47 y.o. female with PMH of Hypertension, Nonischemic Cardiomyopathy (EF 39% in 2018), asthma was admitted to [place] on 5/3/23 c/o  fatigue and generalized weakness fro 2 weeks after she came back from vacation, getting progressively worse. She admits to episodes of nausea. The patient denies chest pain, palpitations, dizziness, presyncope, syncope,  headache, visual disturbances, CVA, PE, DVT, CHRISTIN, abdominal pain,  hematochezia, melena, dysuria, hematuria, fever, chills. The patient was found to have elevated Troponin, new onset of A. Fib with RVR 170s, AKA. She was started on heparin qtt. The patient was d/c Lasix, Aldactone, Losartan and started on Entresto. The patient was transferred to [facility] today for cardiac cath, possible DCCV vs Ablation. She denies any complaints at present.     CXR 5/3/23 Cardiomegaly. Mild bilateral RIGHT greater than LEFT diffuse airspace disease.    Echo 5/5/23Ejection Fraction Visual Estimate: 40-45 %  Ejection Fraction Arreola: 41 %  1. Normal mitral valve. Mild to moderate mitral  regurgitation.  2. Normal trileaflet aortic valve.  3. Aortic Root: 3.2 cm.  4. Severely dilated left atrium.  LA volume index = 54  cc/m2.  5. Normal left ventricular internal dimensions and wall  thicknesses.  6. Mild global left ventricular systolic dysfunction.  Paradoxical septal motion is seen, consistent with  conduction delay.  7. Unable to adequately assess diastolic function due to  technical aspects of this study.  8. Normal right atrium.  9. Normal right ventricular size and systolic function  (TAPSE  1.7cm).  10. Unable to estimate RVSP.  11. There is trace tricuspid regurgitation.  12. There is trace pulmonic regurgitation.  13. Normal pericardium with no pericardial effusion.    CTPA 5/3/23 1.  No pulmonary embolism, however, the mid to distal segmental and   subsegmental divisions are not well evaluated due to respiratory motion.  2.  Right pleural effusion, septal thickening, groundglass opacities and   patchy consolidation can occur in the clinical setting of pulmonary edema.  3.  Cardiomegaly.               47 y.o. female with PMH of Hypertension, Nonischemic Cardiomyopathy (EF 39% in 2018), asthma was admitted to [place] on 5/3/23 c/o  fatigue and generalized weakness fro 2 weeks after she came back from vacation, getting progressively worse. She admits to episodes of nausea. The patient denies chest pain, palpitations, dizziness, presyncope, syncope,  headache, visual disturbances, CVA, PE, DVT, CHRISTIN, abdominal pain,  hematochezia, melena, dysuria, hematuria, fever, chills. The patient was found to have elevated Troponin, new onset of A. Fib with RVR 170s, AKA. She was started on heparin qtt. Lasix, Aldactone, Losartan were discontinued and Entresto, digoxin were started. The patient was transferred to [facility] today for cardiac cath, possible DCCV vs Ablation. She denies any complaints at present.     CXR 5/3/23 Cardiomegaly. Mild bilateral RIGHT greater than LEFT diffuse airspace disease.    Echo 5/5/23Ejection Fraction Visual Estimate: 40-45 %  Ejection Fraction Arreola: 41 %  1. Normal mitral valve. Mild to moderate mitral  regurgitation.  2. Normal trileaflet aortic valve.  3. Aortic Root: 3.2 cm.  4. Severely dilated left atrium.  LA volume index = 54  cc/m2.  5. Normal left ventricular internal dimensions and wall  thicknesses.  6. Mild global left ventricular systolic dysfunction.  Paradoxical septal motion is seen, consistent with  conduction delay.  7. Unable to adequately assess diastolic function due to  technical aspects of this study.  8. Normal right atrium.  9. Normal right ventricular size and systolic function  (TAPSE  1.7cm).  10. Unable to estimate RVSP.  11. There is trace tricuspid regurgitation.  12. There is trace pulmonic regurgitation.  13. Normal pericardium with no pericardial effusion.    CTPA 5/3/23 1.  No pulmonary embolism, however, the mid to distal segmental and   subsegmental divisions are not well evaluated due to respiratory motion.  2.  Right pleural effusion, septal thickening, groundglass opacities and   patchy consolidation can occur in the clinical setting of pulmonary edema.  3.  Cardiomegaly.

## 2023-05-08 NOTE — CONSULT NOTE ADULT - ATTENDING COMMENTS
47-year-old female hx of HTN and asthma who presented in acute decompensated HF and AF with RVR after going to [place] and missing doses of her medications. LHC revealed no CAD.     #HFrEF  #Persistent AF with RVR  - Long conversation with her and her sister regarding benefits of rhythm control in a young patient with HF.   - Patient apprehensive about undergoing TROY/DCCV, and no guarantee that she will maintain SR  - I discussed options of rate control, but not ideal for her. Discussed that I would not start her on an AAD under after DEVAN is cleared. She eventually would benefit from an AF ablation, but would try DCCV as initial approach.   - Eliquis 5mg BID once cleared by interventional cardiology  - Will rediscuss options tomorrow  - Uptitrate BB as tolerated

## 2023-05-09 ENCOUNTER — TRANSCRIPTION ENCOUNTER (OUTPATIENT)
Age: 48
End: 2023-05-09

## 2023-05-09 VITALS
DIASTOLIC BLOOD PRESSURE: 86 MMHG | RESPIRATION RATE: 18 BRPM | OXYGEN SATURATION: 97 % | TEMPERATURE: 99 F | SYSTOLIC BLOOD PRESSURE: 129 MMHG | HEART RATE: 86 BPM

## 2023-05-09 DIAGNOSIS — I10 ESSENTIAL (PRIMARY) HYPERTENSION: ICD-10-CM

## 2023-05-09 DIAGNOSIS — I21.4 NON-ST ELEVATION (NSTEMI) MYOCARDIAL INFARCTION: ICD-10-CM

## 2023-05-09 DIAGNOSIS — I48.91 UNSPECIFIED ATRIAL FIBRILLATION: ICD-10-CM

## 2023-05-09 DIAGNOSIS — M25.572 PAIN IN LEFT ANKLE AND JOINTS OF LEFT FOOT: ICD-10-CM

## 2023-05-09 DIAGNOSIS — I48.19 OTHER PERSISTENT ATRIAL FIBRILLATION: ICD-10-CM

## 2023-05-09 DIAGNOSIS — I50.23 ACUTE ON CHRONIC SYSTOLIC (CONGESTIVE) HEART FAILURE: ICD-10-CM

## 2023-05-09 LAB
ANION GAP SERPL CALC-SCNC: 12 MMOL/L — SIGNIFICANT CHANGE UP (ref 7–14)
APTT BLD: 41.3 SEC — HIGH (ref 27–36.3)
BUN SERPL-MCNC: 18 MG/DL — SIGNIFICANT CHANGE UP (ref 7–23)
CALCIUM SERPL-MCNC: 9.6 MG/DL — SIGNIFICANT CHANGE UP (ref 8.4–10.5)
CHLORIDE SERPL-SCNC: 104 MMOL/L — SIGNIFICANT CHANGE UP (ref 98–107)
CO2 SERPL-SCNC: 23 MMOL/L — SIGNIFICANT CHANGE UP (ref 22–31)
CREAT SERPL-MCNC: 1.14 MG/DL — SIGNIFICANT CHANGE UP (ref 0.5–1.3)
EGFR: 60 ML/MIN/1.73M2 — SIGNIFICANT CHANGE UP
GLUCOSE SERPL-MCNC: 99 MG/DL — SIGNIFICANT CHANGE UP (ref 70–99)
HCT VFR BLD CALC: 41.1 % — SIGNIFICANT CHANGE UP (ref 34.5–45)
HCT VFR BLD CALC: 41.6 % — SIGNIFICANT CHANGE UP (ref 34.5–45)
HGB BLD-MCNC: 13.5 G/DL — SIGNIFICANT CHANGE UP (ref 11.5–15.5)
HGB BLD-MCNC: 13.5 G/DL — SIGNIFICANT CHANGE UP (ref 11.5–15.5)
MCHC RBC-ENTMCNC: 32.5 GM/DL — SIGNIFICANT CHANGE UP (ref 32–36)
MCHC RBC-ENTMCNC: 32.5 PG — SIGNIFICANT CHANGE UP (ref 27–34)
MCHC RBC-ENTMCNC: 32.8 GM/DL — SIGNIFICANT CHANGE UP (ref 32–36)
MCHC RBC-ENTMCNC: 33.2 PG — SIGNIFICANT CHANGE UP (ref 27–34)
MCV RBC AUTO: 102.2 FL — HIGH (ref 80–100)
MCV RBC AUTO: 99 FL — SIGNIFICANT CHANGE UP (ref 80–100)
NRBC # BLD: 0 /100 WBCS — SIGNIFICANT CHANGE UP (ref 0–0)
NRBC # BLD: 0 /100 WBCS — SIGNIFICANT CHANGE UP (ref 0–0)
NRBC # FLD: 0 K/UL — SIGNIFICANT CHANGE UP (ref 0–0)
NRBC # FLD: 0 K/UL — SIGNIFICANT CHANGE UP (ref 0–0)
PLATELET # BLD AUTO: 285 K/UL — SIGNIFICANT CHANGE UP (ref 150–400)
PLATELET # BLD AUTO: 285 K/UL — SIGNIFICANT CHANGE UP (ref 150–400)
POTASSIUM SERPL-MCNC: 4.4 MMOL/L — SIGNIFICANT CHANGE UP (ref 3.5–5.3)
POTASSIUM SERPL-SCNC: 4.4 MMOL/L — SIGNIFICANT CHANGE UP (ref 3.5–5.3)
RBC # BLD: 4.07 M/UL — SIGNIFICANT CHANGE UP (ref 3.8–5.2)
RBC # BLD: 4.15 M/UL — SIGNIFICANT CHANGE UP (ref 3.8–5.2)
RBC # FLD: 14.4 % — SIGNIFICANT CHANGE UP (ref 10.3–14.5)
RBC # FLD: 14.6 % — HIGH (ref 10.3–14.5)
SODIUM SERPL-SCNC: 139 MMOL/L — SIGNIFICANT CHANGE UP (ref 135–145)
VIT B12 SERPL-MCNC: 910 PG/ML — HIGH (ref 200–900)
WBC # BLD: 5.98 K/UL — SIGNIFICANT CHANGE UP (ref 3.8–10.5)
WBC # BLD: 6.05 K/UL — SIGNIFICANT CHANGE UP (ref 3.8–10.5)
WBC # FLD AUTO: 5.98 K/UL — SIGNIFICANT CHANGE UP (ref 3.8–10.5)
WBC # FLD AUTO: 6.05 K/UL — SIGNIFICANT CHANGE UP (ref 3.8–10.5)

## 2023-05-09 PROCEDURE — 73620 X-RAY EXAM OF FOOT: CPT | Mod: 26,LT

## 2023-05-09 PROCEDURE — 73610 X-RAY EXAM OF ANKLE: CPT | Mod: 26,LT

## 2023-05-09 PROCEDURE — 99232 SBSQ HOSP IP/OBS MODERATE 35: CPT | Mod: GC

## 2023-05-09 PROCEDURE — 99239 HOSP IP/OBS DSCHRG MGMT >30: CPT

## 2023-05-09 RX ORDER — APIXABAN 2.5 MG/1
1 TABLET, FILM COATED ORAL
Qty: 60 | Refills: 0
Start: 2023-05-09 | End: 2023-06-07

## 2023-05-09 RX ORDER — CARVEDILOL PHOSPHATE 80 MG/1
3 CAPSULE, EXTENDED RELEASE ORAL
Qty: 180 | Refills: 0
Start: 2023-05-09 | End: 2023-06-07

## 2023-05-09 RX ORDER — ATORVASTATIN CALCIUM 80 MG/1
1 TABLET, FILM COATED ORAL
Qty: 30 | Refills: 0
Start: 2023-05-09 | End: 2023-06-07

## 2023-05-09 RX ORDER — SACUBITRIL AND VALSARTAN 24; 26 MG/1; MG/1
1 TABLET, FILM COATED ORAL
Qty: 60 | Refills: 0
Start: 2023-05-09 | End: 2023-06-07

## 2023-05-09 RX ORDER — LOSARTAN POTASSIUM 100 MG/1
1 TABLET, FILM COATED ORAL
Refills: 0 | DISCHARGE

## 2023-05-09 RX ORDER — PANTOPRAZOLE SODIUM 20 MG/1
1 TABLET, DELAYED RELEASE ORAL
Qty: 30 | Refills: 0
Start: 2023-05-09 | End: 2023-06-07

## 2023-05-09 RX ORDER — CARVEDILOL PHOSPHATE 80 MG/1
1 CAPSULE, EXTENDED RELEASE ORAL
Refills: 0 | DISCHARGE

## 2023-05-09 RX ADMIN — CARVEDILOL PHOSPHATE 18.75 MILLIGRAM(S): 80 CAPSULE, EXTENDED RELEASE ORAL at 05:42

## 2023-05-09 RX ADMIN — PANTOPRAZOLE SODIUM 40 MILLIGRAM(S): 20 TABLET, DELAYED RELEASE ORAL at 05:42

## 2023-05-09 RX ADMIN — HEPARIN SODIUM 4000 UNIT(S): 5000 INJECTION INTRAVENOUS; SUBCUTANEOUS at 06:47

## 2023-05-09 RX ADMIN — Medication 81 MILLIGRAM(S): at 11:40

## 2023-05-09 RX ADMIN — APIXABAN 5 MILLIGRAM(S): 2.5 TABLET, FILM COATED ORAL at 05:42

## 2023-05-09 RX ADMIN — SACUBITRIL AND VALSARTAN 1 TABLET(S): 24; 26 TABLET, FILM COATED ORAL at 05:42

## 2023-05-09 RX ADMIN — HEPARIN SODIUM 1000 UNIT(S)/HR: 5000 INJECTION INTRAVENOUS; SUBCUTANEOUS at 08:18

## 2023-05-09 RX ADMIN — HEPARIN SODIUM 1000 UNIT(S)/HR: 5000 INJECTION INTRAVENOUS; SUBCUTANEOUS at 06:45

## 2023-05-09 NOTE — DISCHARGE NOTE NURSING/CASE MANAGEMENT/SOCIAL WORK - NSDCPEFALRISK_GEN_ALL_CORE
For information on Fall & Injury Prevention, visit: [email or web address] OR  [email or web address] OR  https://www.cdc.gov/steadi/patient.html

## 2023-05-09 NOTE — DISCHARGE NOTE PROVIDER - NSFOLLOWUPCLINICS_GEN_ALL_ED_FT
[facility] General Internal Medicine  General Internal Medicine  [address] [place] [address]  Phone: [phone]  Fax:

## 2023-05-09 NOTE — DISCHARGE NOTE NURSING/CASE MANAGEMENT/SOCIAL WORK - PATIENT PORTAL LINK FT
You can access the FollowMyHealth Patient Portal offered by [facility] by registering at the following website: [email or web address]. By joining [facility]’s FollowMyHealth portal, you will also be able to view your health information using other applications (apps) compatible with our system.

## 2023-05-09 NOTE — DISCHARGE NOTE PROVIDER - NSDCMRMEDTOKEN_GEN_ALL_CORE_FT
acetaminophen 325 mg oral tablet: 2 tab(s) orally every 6 hours As needed Temp greater or equal to 38C (100.4F), Mild Pain (1 - 3)  albuterol 90 mcg/inh inhalation aerosol: 2 puff(s) inhaled every 6 hours, As needed, Shortness of Breath and/or Wheezing  apixaban 5 mg oral tablet: 1 tab(s) orally 2 times a day  aspirin 81 mg oral tablet, chewable: 1 tab(s) orally once a day  carvedilol 12.5 mg oral tablet: 1 tab(s) orally 2 times a day  digoxin 125 mcg (0.125 mg) oral tablet: 1 tab(s) orally once a day  furosemide 20 mg oral tablet: 1 tab(s) orally once a day  heparin 100 units/mL-D5% intravenous solution: 18 unit(s) intravenous once a day  losartan 50 mg oral tablet: 1 tab(s) orally once a day  melatonin 3 mg oral tablet: 1 tab(s) orally once a day (at bedtime) As needed Insomnia  pantoprazole 40 mg oral delayed release tablet: 1 tab(s) orally once a day (before a meal)  sacubitril-valsartan 24 mg-26 mg oral tablet: 1 tab(s) orally 2 times a day  spironolactone 25 mg oral tablet: 1 tab(s) orally once a day   acetaminophen 325 mg oral tablet: 2 tab(s) orally every 6 hours As needed Temp greater or equal to 38C (100.4F), Mild Pain (1 - 3)  albuterol 90 mcg/inh inhalation aerosol: 2 puff(s) inhaled every 6 hours, As needed, Shortness of Breath and/or Wheezing  apixaban 5 mg oral tablet: 1 tab(s) orally 2 times a day  aspirin 81 mg oral tablet, chewable: 1 tab(s) orally once a day  carvedilol 12.5 mg oral tablet: 1 tab(s) orally 2 times a day  carvedilol 6.25 mg oral tablet: 3 tab(s) orally every 12 hours  digoxin 125 mcg (0.125 mg) oral tablet: 1 tab(s) orally once a day  furosemide 20 mg oral tablet: 1 tab(s) orally once a day  heparin 100 units/mL-D5% intravenous solution: 18 unit(s) intravenous once a day  losartan 50 mg oral tablet: 1 tab(s) orally once a day  melatonin 3 mg oral tablet: 1 tab(s) orally once a day (at bedtime) As needed Insomnia  pantoprazole 40 mg oral delayed release tablet: 1 tab(s) orally once a day (before a meal)  sacubitril-valsartan 24 mg-26 mg oral tablet: 1 tab(s) orally 2 times a day  spironolactone 25 mg oral tablet: 1 tab(s) orally once a day   albuterol 90 mcg/inh inhalation aerosol: 2 puff(s) inhaled every 6 hours, As needed, Shortness of Breath and/or Wheezing  apixaban 5 mg oral tablet: 1 tab(s) orally 2 times a day  atorvastatin 40 mg oral tablet: 1 tab(s) orally once a day  carvedilol 6.25 mg oral tablet: 3 tab(s) orally every 12 hours  furosemide 20 mg oral tablet: 1 tab(s) orally once a day  melatonin 3 mg oral tablet: 1 tab(s) orally once a day (at bedtime) As needed Insomnia  sacubitril-valsartan 24 mg-26 mg oral tablet: 1 tab(s) orally 2 times a day

## 2023-05-09 NOTE — DISCHARGE NOTE PROVIDER - HOSPITAL COURSE
47-year-old female hx of HTN and asthma p/w 2 weeks of nausea, fatigue and generalized weakness, shortly after a vacation in [place] with recreational alcohol binging. She has been transferred over to [facility] for a diagnostic LHC in the setting of NSTEMI (without EKG changes or chest pain) w/ HFrEF (negative prior stress test but never angiogram), and atrial fibrillation of unknown duration. LHC 5/8 with nonobstructive CAD.    Impression:  CHF with systolic dysfunction, EF 41%  NSTEMI (hstrop I to 24,937), suspect demand in the setting of AF w/ RVR   Afib (JVchl1ramo = 3) w/ RVR   HTN, HLD     Plan:  - s/p hep gtt, patient now on Eliquis 5mg BID  - OSH TSH WNL   - Patient's GDMT includes entresto 24/26 bid, carvedilol. Can consider outpatient SGLT2i  - digoxin dc'd, optimize rate control with uptitration of carvedilol 18.75mg PO BID (max 25mg bid). Goal HR < 110bpm   - Outpatient pulmonary/sleep study as likely with obstructive sleep apnea   - Patient is precontemplative to DCCV. EP discussed with patient at length. Patient is fearful, anxious, and wants further time to dwell on the decision. EP further educated patient on benefits of maintaining sinus rhythm, including improved mortality and decreased morbidity from heart failure exacerbations. Patient would like to follow both general cardiology and EP as an outpatient here.    On 5/9/23, case was discussed with , patient is medically cleared and optimized for discharge today. All medications were reviewed with attending, and sent to mutually agreed upon pharmacy.    47-year-old female hx of HTN and asthma p/w 2 weeks of nausea, fatigue and generalized weakness, shortly after a vacation in [place] with recreational alcohol binging. She has been transferred over to [facility] for a diagnostic C in the setting of NSTEMI (without EKG changes or chest pain) w/ HFrEF (negative prior stress test but never angiogram), and atrial fibrillation of unknown duration, for possible DCCV vs Ablation.   s/p LHC 5/8 with nonobstructive CAD          Acute on chronic systolic congestive heart failure.   -cont GDMT includes entresto 24/26 bid, carvedilol. Can consider outpatient SGLT2i  - digoxin dc'd, optimize rate control with uptitration of carvedilol (max 25mg bid). Goal HR < 110bpm   - Outpatient pulmonary/sleep study as likely with obstructive sleep apnea  Echo on 5/5/23 showedc Ejection Fraction Visual Estimate: 40-45 %  Ejection Fraction Arreola: 41 %  Mild global left ventricular systolic dysfunction. Paradoxical septal motion is seen, consistent with conduction delay.  pt feels well, on current meds, eager to go home, dc today, dc planning time spent in [id number] ( preparing dc summary and plan).      NSTEMI (non-ST elevation myocardial infarction).   Cont GDMT, f/u with cards as outpt.      Other persistent atrial fibrillation.    Afib (RJacj0zqgu = 3) w/ RVR  Continue DOAC (Eliquis 5bid), dc hep gtt   - OSH TSH WNL   - Patient's GDMT includes entresto 24/26 bid, carvedilol. Can consider outpatient SGLT2i  - digoxin dc'd, optimize rate control with uptitration of carvedilol (max 25mg bid). Goal HR < 110bpm   - Outpatient pulmonary/sleep study as likely with obstructive sleep apnea   - Patient is precontemplative to DCCV. Wants to f/u as outpt.    HTN (hypertension).   ·  Plan: cont current meds.    Left ankle pain.    left foot and ankle xay showed No fracture or dislocation.  ace wrap advised, able to ambulate  stable for dc        On 5/9/23, case was discussed with , patient is medically cleared and optimized for discharge today. All medications were reviewed with attending, and sent to mutually agreed upon pharmacy.

## 2023-05-09 NOTE — DISCHARGE NOTE PROVIDER - NSDCCPCAREPLAN_GEN_ALL_CORE_FT
PRINCIPAL DISCHARGE DIAGNOSIS  Diagnosis: NSTEMI (non-ST elevation myocardial infarction)  Assessment and Plan of Treatment: You were transfered to the \[facility]\"" for a heart catheterization for myocardial infarction (decreased blood flow to the heart). You were seen by the cardiology team (heart doctors). You had a heart catheterization performed and there was nonobstructive coronary artery disease present. You were started on eliquis. Continue medications as prescribed. Follow up with your primary care doctor for further evaluation and management. Please call to make an appointment within 1-2 weeks of discharge. If you experience increased chest pain, sudden dizziness, headache, change in vision, or difficulty breathing please return to the nearest hospital.      SECONDARY DISCHARGE DIAGNOSES  Diagnosis: Afib  Assessment and Plan of Treatment: You have atrial fibrillation (an abnormal heart rate). You were seen by the cardiologist and electrophysiologist (heart doctors). Your medication digoxin has been discontinued. You were planned to have a cardioconversion of your heart to regulate the rate of your heart. You had an extensive conversation with the heart doctor and have decided to defer the procedure and to follow up both cardiology and electrophysiology doctors in the outpatient setting.     PRINCIPAL DISCHARGE DIAGNOSIS  Diagnosis: NSTEMI (non-ST elevation myocardial infarction)  Assessment and Plan of Treatment: You were transfered to the [facility] for a heart catheterization for myocardial infarction (decreased blood flow to the heart). You were seen by the cardiology team (heart doctors). You had a heart catheterization performed and there was nonobstructive coronary artery disease present. You were started on eliquis. Continue medications as prescribed. Follow up with your primary care doctor for further evaluation and management. Please call to make an appointment within 1-2 weeks of discharge. If you experience increased chest pain, sudden dizziness, headache, change in vision, or difficulty breathing please return to the nearest hospital.      SECONDARY DISCHARGE DIAGNOSES  Diagnosis: Afib  Assessment and Plan of Treatment: You have atrial fibrillation (an abnormal heart rate). You were seen by the cardiologist and electrophysiologist (heart doctors). Your medication digoxin has been discontinued. You were planned to have a cardioconversion of your heart to regulate the rate of your heart. You had an extensive conversation with the heart doctor and have decided to defer the procedure and to follow up both cardiology and electrophysiology doctors in the outpatient setting.    Diagnosis: Left ankle pain  Assessment and Plan of Treatment: You had a complaint of left ankle pain. An Xray of your ankle was negative for any dislocations or fractures. Continue medications as prescribed. Follow up with your primary care doctor for further evaluation and management. Please call to make an appointment within 1-2 weeks of discharge.     PRINCIPAL DISCHARGE DIAGNOSIS  Diagnosis: NSTEMI (non-ST elevation myocardial infarction)  Assessment and Plan of Treatment: You were transfered to the [facility] for a heart catheterization for myocardial infarction (decreased blood flow to the heart). You were seen by the cardiology team (heart doctors). You had a heart catheterization performed and there was nonobstructive coronary artery disease present. You were started on eliquis. Continue medications as prescribed. Follow up with your primary care doctor for further evaluation and management. Please call to make an appointment within 1-2 weeks of discharge. If you experience increased chest pain, sudden dizziness, headache, change in vision, or difficulty breathing please return to the nearest hospital.      SECONDARY DISCHARGE DIAGNOSES  Diagnosis: Afib  Assessment and Plan of Treatment: You have atrial fibrillation (an abnormal heart rate). You were seen by the cardiologist and electrophysiologist (heart doctors). Your medication digoxin has been discontinued. You were planned to have a cardioconversion of your heart to regulate the rate of your heart. You had an extensive conversation with the heart doctor and discussed the benefits of maintaining sinus rhythm, including improved mortality and decreased morbidity from heart failure exacerbations.  You have decided to defer the procedure and to follow up both cardiology and electrophysiology doctors in the outpatient setting.    Diagnosis: Left ankle pain  Assessment and Plan of Treatment: You had a complaint of left ankle pain. An Xray of your ankle was negative for any dislocations or fractures. Continue medications as prescribed. Follow up with your primary care doctor for further evaluation and management. Please call to make an appointment within 1-2 weeks of discharge.    Diagnosis: Sleep apnea, obstructive  Assessment and Plan of Treatment: You may have obstructive sleep apnea. It is recommended your follow up with a pulmonologist for a sleep study for further testing in the outpatient setting. Continue medications as prescribed. Follow up with your primary care doctor and pulmonologist for further evaluation and management. Please call to make an appointment within 1-2 weeks of discharge.     PRINCIPAL DISCHARGE DIAGNOSIS  Diagnosis: NSTEMI (non-ST elevation myocardial infarction)  Assessment and Plan of Treatment: You were transfered to the [facility] for a heart catheterization for myocardial infarction (decreased blood flow to the heart). You were seen by the cardiology team (heart doctors). You had a heart catheterization performed and there was nonobstructive coronary artery disease present. You were started on eliquis. Continue medications as prescribed. Follow up with your primary care doctor for further evaluation and management. Please call to make an appointment within 1-2 weeks of discharge. If you experience increased chest pain, sudden dizziness, headache, change in vision, or difficulty breathing please return to the nearest hospital.      SECONDARY DISCHARGE DIAGNOSES  Diagnosis: Afib  Assessment and Plan of Treatment: You have atrial fibrillation (an abnormal heart rate). You were seen by the cardiologist and electrophysiologist (heart doctors). Your medication digoxin has been discontinued. You were planned to have a cardioconversion of your heart to regulate the rate of your heart. You had an extensive conversation with the heart doctor and discussed the benefits of maintaining sinus rhythm, including improved mortality and decreased morbidity from heart failure exacerbations.  You have decided to defer the procedure and to follow up both cardiology and electrophysiology doctors in the outpatient setting.    Diagnosis: Left ankle pain  Assessment and Plan of Treatment: You had a complaint of left ankle pain. An Xray of your ankle was negative for any dislocations or fractures. You deferred a PT evaluation. Wrap with an ace wrap. Apply a cold compress for 10 - 20 minutes 3 - 5 times a day. After 48- 72 hours and swelling has gone down you may apply heat to the area that hurts. Continue medications as prescribed. Follow up with your primary care doctor for further evaluation and management. Please call to make an appointment within 1-2 weeks of discharge.    Diagnosis: Sleep apnea, obstructive  Assessment and Plan of Treatment: You may have obstructive sleep apnea. It is recommended your follow up with a pulmonologist for a sleep study for further testing in the outpatient setting. Continue medications as prescribed. Follow up with your primary care doctor and pulmonologist for further evaluation and management. Please call to make an appointment within 1-2 weeks of discharge.

## 2023-05-09 NOTE — DISCHARGE NOTE NURSING/CASE MANAGEMENT/SOCIAL WORK - NSDCFUADDAPPT_GEN_ALL_CORE_FT
Continue medications as prescribed. Follow up with your primary care doctor and cardiologist for further evaluation and management. Please call to make an appointment within 1-2 weeks of discharge.     Outpatient pulmonary/sleep study as likely with obstructive sleep apnea.

## 2023-05-09 NOTE — PROGRESS NOTE ADULT - PROBLEM SELECTOR PLAN 3
Afib ([id number] = 3) w/ RVR  Continue DOAC (Eliquis 5bid), dc hep gtt   - OSH TSH WNL   - Patient's GDMT includes entresto 24/26 bid, carvedilol. Can consider outpatient SGLT2i  - digoxin dc'd, optimize rate control with uptitration of carvedilol (max 25mg bid). Goal HR < 110bpm   - Outpatient pulmonary/sleep study as likely with obstructive sleep apnea   - Patient is precontemplative to DCCV. Wants to f/u as outpt

## 2023-05-09 NOTE — PROGRESS NOTE ADULT - ASSESSMENT
47-year-old female hx of HTN and asthma p/w 2 weeks of nausea, fatigue and generalized weakness, shortly after a vacation in [place] with recreational alcohol binging. She has been transferred over to [facility] for a diagnostic LHC in the setting of NSTEMI (without EKG changes or chest pain) w/ HFrEF (negative prior stress test but never angiogram), and atrial fibrillation of unknown duration, for possible DCCV vs Ablation.   s/p LHC 5/8 with nonobstructive CAD

## 2023-05-09 NOTE — PROGRESS NOTE ADULT - ATTENDING COMMENTS
47-year-old female hx of HTN and asthma who presented in acute decompensated HF and AF with RVR after going to [place] and missing doses of her medications. LHC revealed no CAD.     #HFrEF  #Persistent AF with RVR  - Long conversation last night with her and her sister regarding benefits of rhythm control in a young patient with HF.   - Patient apprehensive about undergoing TROY/DCCV, and no guarantee that she will maintain SR  - I discussed options of rate control, but not ideal for her. Discussed that I would not start her on an AAD under after DEVAN is cleared. She eventually would benefit from an AF ablation, but would try DCCV as initial approach.   - Eliquis 5mg BID  - Unable to have follow up conversation with her today as patient was discharged prior to me seeing her.

## 2023-05-09 NOTE — DISCHARGE NOTE PROVIDER - CARE PROVIDER_API CALL
Your PCP,   Phone: (   )    -  Fax: (   )    -  Follow Up Time:     Brad Reed)  Cardiovascular Disease; Internal Medicine  [address] [place] [address]  Phone: [phone]  Fax: [phone]  Follow Up Time:    Brad Reed)  Cardiovascular Disease; Internal Medicine  [address] [place] [id number]  Phone: [phone]  Fax: [phone]  Follow Up Time:     Your PCP,   Phone: (   )    -  Fax: (   )    -  Follow Up Time:     [facility],   Pulmonary and Sleep Medicine at [place]  [address], [place] [address]  Phone: [phone]  Fax: (   )    -  Follow Up Time:     [facility],   Dermatology at [place]  [address], [place] [address]  Phone: [phone]  Fax: (   )    -  Follow Up Time:

## 2023-05-09 NOTE — PROGRESS NOTE ADULT - ASSESSMENT
47-year-old female hx of HTN and asthma p/w 2 weeks of nausea, fatigue and generalized weakness, shortly after a vacation in [place] with recreational alcohol binging. She has been transferred over to [facility] for a diagnostic LHC in the setting of NSTEMI (without EKG changes or chest pain) w/ HFrEF (negative prior stress test but never angiogram), and atrial fibrillation of unknown duration.  LHC 5/8 with nonobstructive CAD     Impression:  CHF with systolic dysfunction, EF 41%  NSTEMI (hstrop I to 24,937), suspect demand in the setting of AF w/ RVR   Afib (COqyd8wuib = 3) w/ RVR   HTN, HLD     Plan:  - Continue DOAC, dc hep gtt   - OSH TSH WNL   - Patient's GDMT includes entresto 24/26 bid, carvedilol. Can consider outpatient SGLT2i  - digoxin dc'd, optimize rate control with uptitration of carvedilol (max 25mg bid). Goal HR < 110bpm   - Outpatient pulmonary/sleep study as likely with obstructive sleep apnea   - Patient is contemplative to DCCV. Ongoing discussions with EP     47-year-old female hx of HTN and asthma p/w 2 weeks of nausea, fatigue and generalized weakness, shortly after a vacation in [place] with recreational alcohol binging. She has been transferred over to [facility] for a diagnostic LHC in the setting of NSTEMI (without EKG changes or chest pain) w/ HFrEF (negative prior stress test but never angiogram), and atrial fibrillation of unknown duration.  LHC 5/8 with nonobstructive CAD     Impression:  CHF with systolic dysfunction, EF 41%  NSTEMI (hstrop I to 24,937), suspect demand in the setting of AF w/ RVR   Afib (LGyrp2eimd = 3) w/ RVR   HTN, HLD     Plan:  - Continue DOAC (Eliquis 5bid), dc hep gtt   - OSH TSH WNL   - Patient's GDMT includes entresto 24/26 bid, carvedilol. Can consider outpatient SGLT2i  - digoxin dc'd, optimize rate control with uptitration of carvedilol (max 25mg bid). Goal HR < 110bpm   - Outpatient pulmonary/sleep study as likely with obstructive sleep apnea   - Patient is precontemplative to DCCV. I discussed with her at length. She is fearful, anxious, and wants further time to dwell on the decision. I further educated on benefits of maintaining sinus rhythm, including improved mortality and decreased morbidity from heart failure exacerbations. Patient would like to follow both general cardiology and EP as an outpatient here.    In lieu of patient deferring expedited inpatient DCCV this admission, we will sign off for now. If patient achieves euvolemia and with controlled rates (<110BPM) consistently, no EP contraindications to discharge.

## 2023-05-09 NOTE — PROGRESS NOTE ADULT - PROBLEM SELECTOR PLAN 5
left foot and ankle xay showed No fracture or dislocation.  ace wrap advised, able to ambulate    Plan discussed with ACP

## 2023-05-09 NOTE — PROGRESS NOTE ADULT - PROBLEM SELECTOR PLAN 1
-cont GDMT includes entresto 24/26 bid, carvedilol. Can consider outpatient SGLT2i  - digoxin dc'd, optimize rate control with uptitration of carvedilol (max 25mg bid). Goal HR < 110bpm   - Outpatient pulmonary/sleep study as likely with obstructive sleep apnea  Echo on 5/5/23 showedc Ejection Fraction Visual Estimate: 40-45 %  Ejection Fraction Arreola: 41 %  Mild global left ventricular systolic dysfunction. Paradoxical septal motion is seen, consistent with conduction delay.  pt feels well, on current meds, eager to go home, dc today, dc planning time spent in [id number] ( preparing dc summary and plan)

## 2023-05-09 NOTE — DISCHARGE NOTE NURSING/CASE MANAGEMENT/SOCIAL WORK - NSDCPEELIQUISREACT_GEN_ALL_CORE
Apixaban/Eliquis increases your risk for bleeding. Notify your doctor if you experience any of the following side effects: bleeding, coughing or vomiting blood, red or black stool, unexpected pain or swelling, itching or hives, chest pain, chest tightness, trouble breathing, changes in how much or how often you urinate, red or pink urine, numbness or tingling in your feet, or unusual muscle weakness. When Apixaban/Eliquis is taken with other medicines, they can affect how it works. Taking other medications such as aspirin, blood thinners, nonsteroidal anti-inflammatories, and medications that treat depression can increase your risk of bleeding. It is very important to tell your health care provider about all of the other medicines, including over-the-counter medications, herbs, and vitamins you are taking. DO NOT start, stop, or change the dosage of any medicine, including over-the-counter medicines, vitamins, and herbal products without your doctor’s approval. Any products containing aspirin or are nonsteroidal anti-inflammatories lessen the blood’s ability to form clots and add to the effect of Apixaban/Eliquis. Never take aspirin or medicines that contain aspirin without speaking to your doctor. Airway patent, Nasal mucosa clear. Mouth with normal mucosa.

## 2023-05-09 NOTE — PROGRESS NOTE ADULT - SUBJECTIVE AND OBJECTIVE BOX
Nahum Chandra MD  Cardiology Fellow  [phone]  All Cardiology service information can be found 24/7 on [email or web address], password: [email or web address]    Patient seen and examined at bedside.  Cath yesterday without obstructive CAD. Orders for both apixaban and hep gtt   XR ordered of foot for left ankle pain   Patient expressed significant reservations over plan for DCCV given success rates and unclear outcomes. It was relayed to her that getting her in sinus rhythm would improve not only her mortality long term but also recurrent hospitalizations and heart failure exacerbations  VS overnight with Af HR 100s, /87 satting 97% Ra  Today CBC wnl, Cre 1.14    Review Of Systems: No chest pain, shortness of breath, or palpitations            Current Meds:  albuterol    90 MICROgram(s) HFA Inhaler 2 Puff(s) Inhalation every 6 hours PRN  apixaban 5 milliGRAM(s) Oral two times a day  aspirin  chewable 81 milliGRAM(s) Oral daily  carvedilol 18.75 milliGRAM(s) Oral every 12 hours  heparin   Injectable 8000 Unit(s) IV Push every 6 hours PRN  heparin   Injectable 4000 Unit(s) IV Push every 6 hours PRN  heparin  Infusion. 800 Unit(s)/Hr IV Continuous <Continuous>  pantoprazole    Tablet 40 milliGRAM(s) Oral before breakfast  sacubitril 24 mG/valsartan 26 mG 1 Tablet(s) Oral two times a day      Vitals:  T(F): 98.5 (05-09), Max: 98.9 (05-08)  HR: 102 (05-09) (60 - 108)  BP: 135/87 (05-09) (135/87 - 151/112)  RR: 18 (05-09)  SpO2: 97% (05-09)  I&O's Summary    Physical Exam:  Appearance: No acute distress; well appearing  Eyes: PERRL, EOMI, pink conjunctiva  HEENT: Normal oral mucosa  Cardiovascular: RRR, S1, S2, no murmurs, rubs, or gallops; no edema; no JVD  Respiratory: Clear to auscultation bilaterally  Gastrointestinal: soft, non-tender, non-distended with normal bowel sounds  Musculoskeletal: No clubbing; no joint deformity   Neurologic: Non-focal  Lymphatic: No lymphadenopathy  Psychiatry: AAOx3, mood & affect appropriate  Skin: No rashes, ecchymoses, or cyanosis                          13.5   6.05  )-----------( 285      ( 09 May 2023 05:30 )             41.1     05-09    139  |  104  |  18  ----------------------------<  99  4.4   |  23  |  1.14    Ca    9.6      09 May 2023 05:30    TPro  6.5  /  Alb  2.8<L>  /  TBili  0.5  /  DBili  x   /  AST  56<H>  /  ALT  117<H>  /  AlkPhos  79  05-08    PTT - ( 09 May 2023 05:30 )  PTT:41.3 sec  CARDIAC MARKERS ( 03 May 2023 21:20 )  x     / x     / x     / 611 U/L / x     / 48.3 ng/mL    EKG: atrial fibrillation, left axis deviation, PVC, nonspecific ST flattening, HR 92     CXR 5/3/23 Cardiomegaly.Mild bilateral RIGHT greater than LEFT diffuse airspace disease.    Echo 5/5/23Ejection Fraction Visual Estimate: 40-45 %  Ejection Fraction Arreola: 41 %  1. Normal mitral valve. Mild to moderate mitral  regurgitation.  2. Normal trileaflet aortic valve.  3. Aortic Root: 3.2 cm.  4. Severely dilated left atrium.  LA volume index = 54  cc/m2.  5. Normal left ventricular internal dimensions and wall  thicknesses.  6. Mild global left ventricular systolic dysfunction.  Paradoxical septal motion is seen, consistent with  conduction delay.  7. Unable to adequately assess diastolic function due to  technical aspects of this study.  8. Normal right atrium.  9. Normal right ventricular size and systolic function  (TAPSE  1.7cm).  10. Unable to estimate RVSP.  11. There is trace tricuspid regurgitation.  12. There is trace pulmonic regurgitation.  13. Normal pericardium with no pericardial effusion.    CTPA 5/3/23 1.  No pulmonary embolism, however, the mid to distal segmental and   subsegmental divisions are not well evaluated due to respiratory motion.  2.  Right pleural effusion, septal thickening, groundglass opacities and   patchy consolidation can occur in the clinical setting of pulmonary edema.  3.  Cardiomegaly.  
Patient is a 47y old  Female who presents with a chief complaint of New onset Atrial Fibrillation (08 May 2023 13:32)      SUBJECTIVE / OVERNIGHT EVENTS: Pt seen and examined at 11am, overnight events noted, tele with afib, this am pt reports some left ankle discomfort, denies any sob, chest pain, palpitations, dizziness or any other complaints, Is eager to go home. No other new issues reported.    MEDICATIONS  (STANDING):  apixaban 5 milliGRAM(s) Oral two times a day  aspirin  chewable 81 milliGRAM(s) Oral daily  carvedilol 18.75 milliGRAM(s) Oral every 12 hours  pantoprazole    Tablet 40 milliGRAM(s) Oral before breakfast  sacubitril 24 mG/valsartan 26 mG 1 Tablet(s) Oral two times a day    MEDICATIONS  (PRN):  albuterol    90 MICROgram(s) HFA Inhaler 2 Puff(s) Inhalation every 6 hours PRN Shortness of Breath and/or Wheezing      Vital Signs Last 24 Hrs  T(C): 37.1 (09 May 2023 14:59), Max: 37.2 (08 May 2023 21:27)  T(F): 98.8 (09 May 2023 14:59), Max: 98.9 (08 May 2023 21:27)  HR: 86 (09 May 2023 14:59) (86 - 108)  BP: 129/86 (09 May 2023 14:59) (129/86 - 151/112)  BP(mean): 100 (09 May 2023 14:59) (100 - 100)  RR: 18 (09 May 2023 14:59) (18 - 18)  SpO2: 97% (09 May 2023 14:59) (97% - 99%)    Parameters below as of 09 May 2023 14:59  Patient On (Oxygen Delivery Method): room air      CAPILLARY BLOOD GLUCOSE        I&O's Summary      PHYSICAL EXAM:  GENERAL: NAD, well-developed  CHEST/LUNG: Clear to auscultation bilaterally; No wheeze  HEART: Irregularly irregular  ABDOMEN: Soft, Nontender, Nondistended  EXTREMITIES:  Left ankle with some edema, mildly ttp  PSYCH: Calm  NEUROLOGY: AAOx3  SKIN: No rashes or lesions    LABS:                        13.5   6.05  )-----------( 285      ( 09 May 2023 05:30 )             41.1     05-09    139  |  104  |  18  ----------------------------<  99  4.4   |  23  |  1.14    Ca    9.6      09 May 2023 05:30    TPro  6.5  /  Alb  2.8<L>  /  TBili  0.5  /  DBili  x   /  AST  56<H>  /  ALT  117<H>  /  AlkPhos  79  05-08    PTT - ( 09 May 2023 05:30 )  PTT:41.3 sec          RADIOLOGY & ADDITIONAL TESTS:  < from: Xray Foot AP + Lateral, Left (05.09.23 @ 10:04) >   XR FOOT 2 VIEWS LT   ORDERED BY: KRZYSZTOF REEVES     ACC: [id number] EXAM:  XR ANKLE COMP MIN 3 VIEWS LT   ORDERED BY: KRZYSZTOF REEVES     < end of copied text >  < from: Xray Foot AP + Lateral, Left (05.09.23 @ 10:04) >  No fracture or dislocation.    < end of copied text >    Imaging Personally Reviewed:    Consultant(s) Notes Reviewed:      Care Discussed with Consultants/Other Providers:

## 2023-05-09 NOTE — DISCHARGE NOTE PROVIDER - NSDCFUADDAPPT_GEN_ALL_CORE_FT
Continue medications as prescribed. Follow up with your primary care doctor and cardiologist for further evaluation and management. Please call to make an appointment within 1-2 weeks of discharge.  Continue medications as prescribed. Follow up with your primary care doctor and cardiologist for further evaluation and management. Please call to make an appointment within 1-2 weeks of discharge.     Outpatient pulmonary/sleep study as likely with obstructive sleep apnea.

## 2023-05-09 NOTE — DISCHARGE NOTE PROVIDER - PROVIDER TOKENS
FREE:[LAST:[Your PCP],PHONE:[(   )    -],FAX:[(   )    -]],PROVIDER:[TOKEN:[[id number]]] PROVIDER:[TOKEN:[[id number]]],FREE:[LAST:[Your PCP],PHONE:[(   )    -],FAX:[(   )    -]],FREE:[LAST:[[facility]],PHONE:[phone]],FAX:[(   )    -],ADDRESS:[Pulmonary and Sleep Medicine at [place] [id number], [place] [address]]],FREE:[LAST:[[facility]],PHONE:[phone]:[(   )    -],ADDRESS:[Dermatology at [address], [place] [address]]]

## 2023-05-12 PROBLEM — Z00.00 ENCOUNTER FOR PREVENTIVE HEALTH EXAMINATION: Status: ACTIVE | Noted: 2023-05-12

## 2023-05-12 PROBLEM — Z86.79 PERSONAL HISTORY OF OTHER DISEASES OF THE CIRCULATORY SYSTEM: Chronic | Status: ACTIVE | Noted: 2023-05-08

## 2023-05-12 PROBLEM — Z87.2 PERSONAL HISTORY OF DISEASES OF THE SKIN AND SUBCUTANEOUS TISSUE: Chronic | Status: ACTIVE | Noted: 2023-05-08

## 2023-05-12 PROBLEM — I50.22 CHRONIC SYSTOLIC (CONGESTIVE) HEART FAILURE: Chronic | Status: ACTIVE | Noted: 2023-05-08

## 2023-05-15 NOTE — CHART NOTE - NSCHARTNOTEFT_GEN_A_CORE
Post-Discharge Medication Review    Three attempts were made to contact patient to offer medication counseling post-discharge. Patient could not be reached, and medication counseling could not be completed.
Pt reporting 5/10 dull aching ankle pain and swelling s/p hitting against something at [facility]. swelling of lateral malleolus LLE worse with ambulation no ecchymosis non-tender to palpation pulses palpable sensation and motor function intact.     -f/u ankle and foot x-rays   -RICE regimen for pain control   -Will continue to monitor     Prasanth Sandra PA-C  Department of Internal Medicine  In-House beeper number [phone]
Pt is s/p cath w/ right radial access.   Patient denies pain, numbness, tingling, CP, SOB.     Vital Signs Last 24 Hrs  T(C): 37.2 (08 May 2023 21:27), Max: 37.2 (08 May 2023 21:27)  T(F): 98.9 (08 May 2023 21:27), Max: 98.9 (08 May 2023 21:27)  HR: 108 (08 May 2023 21:27) (60 - 115)  BP: 151/112 (08 May 2023 21:27) (134/99 - 151/112)  BP(mean): --  RR: 18 (08 May 2023 21:27) (18 - 19)  SpO2: 99% (08 May 2023 21:27) (96% - 99%)    Parameters below as of 08 May 2023 21:27  Patient On (Oxygen Delivery Method): room air      VSS.     Dressing is clear/dry/intact.   Site is without hematoma or bleeding.   Pulses palpable, cap refill <3 sec.   Strength, sensation intact in UE b/l     Will continue to monitor.     Prasanth Sandra PA-C  Department of Internal Medicine  In-House beeper number [phone]

## 2023-05-25 ENCOUNTER — APPOINTMENT (OUTPATIENT)
Dept: ELECTROPHYSIOLOGY | Facility: CLINIC | Age: 48
End: 2023-05-25

## 2023-07-05 ENCOUNTER — INPATIENT (INPATIENT)
Facility: HOSPITAL | Age: 48
LOS: 6 days | Discharge: HOME | End: 2023-07-12
Attending: INTERNAL MEDICINE | Admitting: INTERNAL MEDICINE
Payer: MEDICAID

## 2023-07-05 VITALS
RESPIRATION RATE: 20 BRPM | OXYGEN SATURATION: 100 % | DIASTOLIC BLOOD PRESSURE: 106 MMHG | HEART RATE: 166 BPM | TEMPERATURE: 98 F | SYSTOLIC BLOOD PRESSURE: 133 MMHG

## 2023-07-05 DIAGNOSIS — I48.91 UNSPECIFIED ATRIAL FIBRILLATION: ICD-10-CM

## 2023-07-05 LAB
ALBUMIN SERPL ELPH-MCNC: 4 G/DL — SIGNIFICANT CHANGE UP (ref 3.3–5)
ALP SERPL-CCNC: 109 U/L — SIGNIFICANT CHANGE UP (ref 40–120)
ALT FLD-CCNC: 272 U/L — HIGH (ref 4–33)
ANION GAP SERPL CALC-SCNC: 20 MMOL/L — HIGH (ref 7–14)
APTT BLD: 31.2 SEC — SIGNIFICANT CHANGE UP (ref 27–36.3)
AST SERPL-CCNC: 129 U/L — HIGH (ref 4–32)
B PERT DNA SPEC QL NAA+PROBE: SIGNIFICANT CHANGE UP
B PERT+PARAPERT DNA PNL SPEC NAA+PROBE: SIGNIFICANT CHANGE UP
BASOPHILS # BLD AUTO: 0.03 K/UL — SIGNIFICANT CHANGE UP (ref 0–0.2)
BASOPHILS NFR BLD AUTO: 0.6 % — SIGNIFICANT CHANGE UP (ref 0–2)
BILIRUB SERPL-MCNC: 2 MG/DL — HIGH (ref 0.2–1.2)
BORDETELLA PARAPERTUSSIS (RAPRVP): SIGNIFICANT CHANGE UP
BUN SERPL-MCNC: 32 MG/DL — HIGH (ref 7–23)
C PNEUM DNA SPEC QL NAA+PROBE: SIGNIFICANT CHANGE UP
CALCIUM SERPL-MCNC: 9.7 MG/DL — SIGNIFICANT CHANGE UP (ref 8.4–10.5)
CHLORIDE SERPL-SCNC: 105 MMOL/L — SIGNIFICANT CHANGE UP (ref 98–107)
CO2 SERPL-SCNC: 14 MMOL/L — LOW (ref 22–31)
CREAT SERPL-MCNC: 1.28 MG/DL — SIGNIFICANT CHANGE UP (ref 0.5–1.3)
EGFR: 52 ML/MIN/1.73M2 — LOW
EOSINOPHIL # BLD AUTO: 0.05 K/UL — SIGNIFICANT CHANGE UP (ref 0–0.5)
EOSINOPHIL NFR BLD AUTO: 1 % — SIGNIFICANT CHANGE UP (ref 0–6)
FLUAV SUBTYP SPEC NAA+PROBE: SIGNIFICANT CHANGE UP
FLUBV RNA SPEC QL NAA+PROBE: SIGNIFICANT CHANGE UP
GAS PNL BLDV: SIGNIFICANT CHANGE UP
GLUCOSE SERPL-MCNC: 104 MG/DL — HIGH (ref 70–99)
HADV DNA SPEC QL NAA+PROBE: SIGNIFICANT CHANGE UP
HCOV 229E RNA SPEC QL NAA+PROBE: SIGNIFICANT CHANGE UP
HCOV HKU1 RNA SPEC QL NAA+PROBE: SIGNIFICANT CHANGE UP
HCOV NL63 RNA SPEC QL NAA+PROBE: SIGNIFICANT CHANGE UP
HCOV OC43 RNA SPEC QL NAA+PROBE: SIGNIFICANT CHANGE UP
HCT VFR BLD CALC: 39.7 % — SIGNIFICANT CHANGE UP (ref 34.5–45)
HGB BLD-MCNC: 12.6 G/DL — SIGNIFICANT CHANGE UP (ref 11.5–15.5)
HMPV RNA SPEC QL NAA+PROBE: SIGNIFICANT CHANGE UP
HPIV1 RNA SPEC QL NAA+PROBE: SIGNIFICANT CHANGE UP
HPIV2 RNA SPEC QL NAA+PROBE: SIGNIFICANT CHANGE UP
HPIV3 RNA SPEC QL NAA+PROBE: SIGNIFICANT CHANGE UP
HPIV4 RNA SPEC QL NAA+PROBE: SIGNIFICANT CHANGE UP
IANC: 2.8 K/UL — SIGNIFICANT CHANGE UP (ref 1.8–7.4)
IMM GRANULOCYTES NFR BLD AUTO: 0.2 % — SIGNIFICANT CHANGE UP (ref 0–0.9)
INR BLD: 1.97 RATIO — HIGH (ref 0.88–1.16)
LYMPHOCYTES # BLD AUTO: 1.58 K/UL — SIGNIFICANT CHANGE UP (ref 1–3.3)
LYMPHOCYTES # BLD AUTO: 30.6 % — SIGNIFICANT CHANGE UP (ref 13–44)
M PNEUMO DNA SPEC QL NAA+PROBE: SIGNIFICANT CHANGE UP
MAGNESIUM SERPL-MCNC: 2.1 MG/DL — SIGNIFICANT CHANGE UP (ref 1.6–2.6)
MCHC RBC-ENTMCNC: 31.7 GM/DL — LOW (ref 32–36)
MCHC RBC-ENTMCNC: 32.4 PG — SIGNIFICANT CHANGE UP (ref 27–34)
MCV RBC AUTO: 102.1 FL — HIGH (ref 80–100)
MONOCYTES # BLD AUTO: 0.7 K/UL — SIGNIFICANT CHANGE UP (ref 0–0.9)
MONOCYTES NFR BLD AUTO: 13.5 % — SIGNIFICANT CHANGE UP (ref 2–14)
NEUTROPHILS # BLD AUTO: 2.8 K/UL — SIGNIFICANT CHANGE UP (ref 1.8–7.4)
NEUTROPHILS NFR BLD AUTO: 54.1 % — SIGNIFICANT CHANGE UP (ref 43–77)
NRBC # BLD: 2 /100 WBCS — HIGH (ref 0–0)
NRBC # FLD: 0.09 K/UL — HIGH (ref 0–0)
PLATELET # BLD AUTO: 238 K/UL — SIGNIFICANT CHANGE UP (ref 150–400)
POTASSIUM SERPL-MCNC: 4.2 MMOL/L — SIGNIFICANT CHANGE UP (ref 3.5–5.3)
POTASSIUM SERPL-SCNC: 4.2 MMOL/L — SIGNIFICANT CHANGE UP (ref 3.5–5.3)
PROT SERPL-MCNC: 6.5 G/DL — SIGNIFICANT CHANGE UP (ref 6–8.3)
PROTHROM AB SERPL-ACNC: 23 SEC — HIGH (ref 10.5–13.4)
RAPID RVP RESULT: SIGNIFICANT CHANGE UP
RBC # BLD: 3.89 M/UL — SIGNIFICANT CHANGE UP (ref 3.8–5.2)
RBC # FLD: 17.5 % — HIGH (ref 10.3–14.5)
RSV RNA SPEC QL NAA+PROBE: SIGNIFICANT CHANGE UP
RV+EV RNA SPEC QL NAA+PROBE: SIGNIFICANT CHANGE UP
SARS-COV-2 RNA SPEC QL NAA+PROBE: SIGNIFICANT CHANGE UP
SODIUM SERPL-SCNC: 139 MMOL/L — SIGNIFICANT CHANGE UP (ref 135–145)
TROPONIN T, HIGH SENSITIVITY RESULT: 26 NG/L — SIGNIFICANT CHANGE UP
WBC # BLD: 5.17 K/UL — SIGNIFICANT CHANGE UP (ref 3.8–10.5)
WBC # FLD AUTO: 5.17 K/UL — SIGNIFICANT CHANGE UP (ref 3.8–10.5)

## 2023-07-05 PROCEDURE — 99233 SBSQ HOSP IP/OBS HIGH 50: CPT

## 2023-07-05 PROCEDURE — 71045 X-RAY EXAM CHEST 1 VIEW: CPT | Mod: 26

## 2023-07-05 PROCEDURE — 99291 CRITICAL CARE FIRST HOUR: CPT

## 2023-07-05 RX ORDER — DIGOXIN 250 MCG
250 TABLET ORAL ONCE
Refills: 0 | Status: DISCONTINUED | OUTPATIENT
Start: 2023-07-05 | End: 2023-07-05

## 2023-07-05 RX ORDER — BUMETANIDE 0.25 MG/ML
1 INJECTION INTRAMUSCULAR; INTRAVENOUS ONCE
Refills: 0 | Status: COMPLETED | OUTPATIENT
Start: 2023-07-05 | End: 2023-07-05

## 2023-07-05 RX ORDER — METOPROLOL TARTRATE 50 MG
5 TABLET ORAL ONCE
Refills: 0 | Status: COMPLETED | OUTPATIENT
Start: 2023-07-05 | End: 2023-07-05

## 2023-07-05 RX ORDER — CHLORHEXIDINE GLUCONATE 213 G/1000ML
1 SOLUTION TOPICAL
Refills: 0 | Status: DISCONTINUED | OUTPATIENT
Start: 2023-07-05 | End: 2023-07-12

## 2023-07-05 RX ORDER — DIGOXIN 250 MCG
500 TABLET ORAL ONCE
Refills: 0 | Status: COMPLETED | OUTPATIENT
Start: 2023-07-05 | End: 2023-07-05

## 2023-07-05 RX ORDER — SODIUM CHLORIDE 9 MG/ML
1000 INJECTION, SOLUTION INTRAVENOUS ONCE
Refills: 0 | Status: COMPLETED | OUTPATIENT
Start: 2023-07-05 | End: 2023-07-05

## 2023-07-05 RX ORDER — DIGOXIN 250 MCG
250 TABLET ORAL EVERY 6 HOURS
Refills: 0 | Status: DISCONTINUED | OUTPATIENT
Start: 2023-07-06 | End: 2023-07-06

## 2023-07-05 RX ADMIN — Medication 5 MILLIGRAM(S): at 17:00

## 2023-07-05 RX ADMIN — SODIUM CHLORIDE 1000 MILLILITER(S): 9 INJECTION, SOLUTION INTRAVENOUS at 17:15

## 2023-07-05 RX ADMIN — BUMETANIDE 1 MILLIGRAM(S): 0.25 INJECTION INTRAMUSCULAR; INTRAVENOUS at 21:28

## 2023-07-05 RX ADMIN — Medication 500 MICROGRAM(S): at 20:31

## 2023-07-05 NOTE — H&P ADULT - HISTORY OF PRESENT ILLNESS
48y female with pmhx of HTN, CHF (EF 41%), Afib on Eliquis and metoprolol who presents to the ED for dizziness who was found to be in AFIB with RVR. Patient states that her medication was changed last week from carvedilol to metoprolol (she was on maximum dose carvedilol but still not achieving rate control). She reports worsening dizziness and fatigue on this medication, and through the holiday weekend she has been noncompliant with her medications. ROS + 1 week of diarrhea  Currently, she is denying any chest pain, shortness of breath (although she is sometimes speaking in abbreviated phrases and appears winded). +cough when supine that is worsened. Denies any fevers, chills, night sweats, leg swelling.    Pt previously admitted to [facility] 5/8; at that time presented w/ 2 weeks of nausea, fatigue and generalized weakness, shortly after a vacation in [place] with recreational alcohol binging. She had been transferred over to [facility] for a diagnostic LHC in the setting of NSTEMI (without EKG changes or chest pain) w/ HFrEF (negative prior stress test but never angiogram), and atrial fibrillation of unknown duration.  Cath was nonobstructive. Patient is precontemplative to DCC.  She was discharged on entresto 24/26bid, carvedilol, and apixaban.    In the ED HR 140s-170s BP 80/50s - 120s/90s. Pt received metoprolol 5mg IV x1, 1L LR bolus. HR persistently 130s-150s.   Lactate 3.7 with neg trops but GREY and transaminitis.

## 2023-07-05 NOTE — ED PROVIDER NOTE - CLINICAL SUMMARY MEDICAL DECISION MAKING FREE TEXT BOX
Patient presents to the ED with Afib with RVR. Patient is hemodynamically stable and afebrile on presentation/ Patient has not taken home medication this morning- took metoprolol prior to presenting. Patient immediately placed on pads and hooked up to monitor. Patient has hx of CHF - one dose of metoprolol given and BP was monitored. EP was paged and will come see patent.  Labs will be obtained.

## 2023-07-05 NOTE — H&P ADULT - ATTENDING COMMENTS
HFrEF, afib with RVR   plan for digoxin load, patient has not been on anticoagulation consistently  keep NPO for possible cardioversion if patient agreeable.   Appreciate EP recs.

## 2023-07-05 NOTE — H&P ADULT - ASSESSMENT
48y female with pmhx of HTN, CHF (EF 41%), Afib on Eliquis and metoprolol who presents to the ED for dizziness who was found to be in AFIB with RVR and ADHF 2/2 med noncompliance. Admitted to CCU for close HD monitoring.     # Neuro:  no issues   -A/Ox3  -mobilize oob to chair as tolerated     #Resp:  - not overtly tachypneic   -on NC for comfort  -cxr with pulm edema   -satting adequately on RA, maintain sats>92%   -diuresis as below     #CV:  //afib with RVR  -likely in setting of med noncompliance   -ekg with afib with RVR without ischemic changes. trops neg.  -HR 150s-160s with BP now 110s-130s but with cool extremities   - s/p lopressor x1 without effect  -start with Digoxin load in setting of HFrEF 500 now and 250q6 as per EP  - zolls at bedside  -HD stable without vasopressor requirements  - MAP goal>65  -EP following. f/u recs    //ADHF in setting of med noncompliance  -HFrEF (EF 41%) on home diuretics. NICM?    - cath 5/23 non obstructive   - s/p bumex in ED. cont with diuresis as needed  -elevated lactate in setting of low flow state. cont to trend with control of HR and diuresis  - hold entresto in setting of ADHF  -f/u repeat TTE in AM    #GI:     -Diet: advance as tolerated   - transaminitis in setting of poor perfusion  -cont to trend LFTS with perfusion markers     #Renal:  // GREY   -likely in setting of volume overload. clinically with LE edema   - s/p 1 L in ED and then diuresed. diuresis as above   -cont to trend crn with diuresis   - shashank, cont to monitor strict I/Os  - replete lytes as appropriate     #ID:  - afebrile, wbc nl  - cont to monitor off abx     #Heme:  - cont home eliquis for A/C for afib   - cbc stable    #Endo:  - previous TSH wnl   - maintain goal glucose<180 on bmp    case and plan discussed with ccu fellow

## 2023-07-05 NOTE — CONSULT NOTE ADULT - SUBJECTIVE AND OBJECTIVE BOX
48y female with pmhx of HTN, CHF, Afib on Eliquis and metoprolol who presents to the ED for dizziness who was found to be in AFIB with RVR. Patient states that her medication was changed last week from carvedilol to metoprolol (she was on maximum dose carvedilol but still not achieving rate control). She reports worsening dizziness and fatigue on this medication, and through the holiday weekend she has been noncompliant with her medications. ROS + 1 week of diarrhea  Currently, she is denying any chest pain, shortness of breath (although she is sometimes speaking in abbreviated phrases and appears winded). +cough when supine that is worsened. Denies any fevers, chills, night sweats, leg swelling.    Patient is familiar to me and the EP service; she was last seen at [facility] ; at that time presented w/ 2 weeks of nausea, fatigue and generalized weakness, shortly after a vacation in [place] with recreational alcohol binging. She had been transferred over to [facility] for a diagnostic LHC in the setting of NSTEMI (without EKG changes or chest pain) w/ HFrEF (negative prior stress test but never angiogram), and atrial fibrillation of unknown duration.  Cath was nonobstructive. Patient is precontemplative to DCCV. I discussed with her at length. Patient would like to follow both general cardiology and EP as an outpatient here.  She was discharged on entresto bid, carvedilol, and apixaban.    In the ED  HR 140s-170s BP 80/50s - 110s/80s  Received metoprolol 5mg IV x1, 1L LR bolus. HR persistently 130s-150s.   CBC unremarkable\  LFTs congested  Lactate 3.7   On exam she is cool. She is incredibly reluctant to receiving any catheter or swan mark procedure as her father  with these interventions in an ICU setting and it was incredibly traumatic to her. She is open and welcome to re-addressing need of these more invasive monitoring devices if she needs it. She again refuses now when I tried to convince her.    PMHx:   Hypertension  Asthma  Noncompliance  H/O cardiomyopathy  Chronic systolic congestive heart failure  H/O eczema    PSHx:   No significant past surgical history    Allergies:  penicillin (Other)  shellfish (Angioedema)    Current Medications:   lactated ringers Bolus 1000 milliLiter(s) IV Bolus once    FAMILY HISTORY:  FH: heart disease  FH: HT/n (hypertension) (Father)  FHx: type 2 diabetes mellitus (Father)    Social History:  Smoking History:  Alcohol Use:  Drug Use:    REVIEW OF SYSTEMS:  CONSTITUTIONAL: No weakness, fevers or chills  EYES/ENT: No visual changes;  No dysphagia  NECK: No pain or stiffness  RESPIRATORY: No cough, wheezing, hemoptysis; No shortness of breath  CARDIOVASCULAR: No chest pain or palpitations; No lower extremity edema  GASTROINTESTINAL: No abdominal or epigastric pain. No nausea, vomiting, or hematemesis; No diarrhea or constipation. No melena or hematochezia.  BACK: No back pain  GENITOURINARY: No dysuria, frequency or hematuria  NEUROLOGICAL: No numbness or weakness  SKIN: No itching, burning, rashes, or lesions   All other review of systems is negative unless indicated above.    Physical Exam:  T(F): 98.2 (), Max: 98.2 ()  HR: 145 () (145 - 171)  BP: 102/81 () (89/57 - 133/106)  RR: 20 ()  SpO2: 100% ()  GENERAL: Speaking in phrases, animated  HEAD:  Atraumatic, Normocephalic  ENT: JVD to ear lobe  CHEST/LUNG: Clear to auscultation bilaterally; No wheeze, equal breath sounds bilaterally   BACK: No spinal tenderness  HEART: Regular rate and rhythm; No murmurs, rubs, or gallops  ABDOMEN: Soft, Nontender, Nondistended; Bowel sounds present  EXTREMITIES: Cool. 1+edema to the shins  PSYCH: Nl behavior, nl affect  NEUROLOGY: AAOx3, non-focal, cranial nerves intact  SKIN: Normal color, No rashes or lesions    ECG: Afib w/ RVR    TTE 2023  EF 41%  CONCLUSIONS:  1. Normal mitral valve. Mild to moderate mitral  regurgitation.  2. Normal trileaflet aortic valve.  3. Aortic Root: 3.2 cm.  4. Severely dilated left atrium.  LA volume index = 54  cc/m2.  5. Normal left ventricular internal dimensions and wall  thicknesses.  6. Mild global left ventricular systolic dysfunction.  Paradoxical septal motion is seen, consistent with  conduction delay.  7. Unable to adequately assess diastolic function due to  technical aspects of this study.  8. Normal right atrium.  9. Normal right ventricular size and systolic function  (TAPSE  1.7cm).  10. Unable to estimate RVSP.  11. There is trace tricuspid regurgitation.  12. There is trace pulmonic regurgitation.  13. Normal pericardium with no pericardial effusion.    [facility] 2023    LM   Left main artery: Angiography shows mild atherosclerosis.      LAD   Left anterior descending artery: There is a 20 % stenosis.      CX   Circumflex: Angiography shows mild atherosclerosis.      RCA   Right coronary artery: There is a 20 % stenosis.

## 2023-07-05 NOTE — H&P ADULT - NSHPSOCIALHISTORY_GEN_ALL_CORE
ex user of marijuana   currently denies smoking, etoh, or any other drug use  lives at home alone. works as .

## 2023-07-05 NOTE — ED PROVIDER NOTE - PROGRESS NOTE DETAILS
Quorishy- Patient seen by EP - Plan to hold off on metoprolol @ this time due to CHF hx and BP. Patient to be admitted to CCU. Lisa- EP/cards fellow recommending digoxin 500micrograms and bumex 1 @ this time. Quorishy- Patient seen by EP - Plan to hold off on metoprolol @ this time due to CHF hx and BP. Patient to be admitted to CCU.  Duke Blankenship DO: received 5mg lopressor. EP recommending against additional lopressor doses. plan for ccu

## 2023-07-05 NOTE — ED ADULT NURSE NOTE - NSFALLHARMRISKINTERV_ED_ALL_ED
Assistance OOB with selected safe patient handling equipment if applicable/Communicate risk of Fall with Harm to all staff, patient, and family/Provide visual cue: red socks, yellow wristband, yellow gown, etc/Reinforce activity limits and safety measures with patient and family/Bed in lowest position, wheels locked, appropriate side rails in place/Call bell, personal items and telephone in reach/Instruct patient to call for assistance before getting out of bed/chair/stretcher/Non-slip footwear applied when patient is off stretcher/Coats to call system/Physically safe environment - no spills, clutter or unnecessary equipment/Purposeful Proactive Rounding/Room/bathroom lighting operational, light cord in reach

## 2023-07-05 NOTE — ED PROVIDER NOTE - ATTENDING CONTRIBUTION TO CARE
48-year-old female past medical history hypertension, CHF, A-fib on Eliquis and metoprolol presents for dizziness found to be in A-fib with RVR.  Prior discharge summaries reviewed.  Patient was on carvedilol 25 mg twice daily however needed stronger beta blockade and was switched to metoprolol XL 50 twice daily.  Patient does report having nauseousness she believes the metoprolol and stopped it 3 days ago.  Does report taking Eliquis regularly however did miss 1 dose yesterday.  Patient does also note several episodes of diarrhea loose stools for the past 4 days.  Also has had decreased appetite.  She denies fevers, chills, headache, chest pain.  She denies any abdominal pain.  Denies any blood in bowel movements.  Normal urination.  Denies any leg swelling.  Exam as above, A-fib RVR between 160s and 180s.  Mentating well speaking full sentences comfortably.  Patient A-fib with RVR possibly triggered by cessation of beta-blocker possibly related to decreased p.o. and diarrhea.  Plan IVF 1 L.  Will give Lopressor 5 mg cautiously given borderline low blood pressure.  Plan: Labs, chest x-ray, continue telemetry monitoring, EP consult.  See progress notes for further admission.

## 2023-07-05 NOTE — ED ADULT NURSE NOTE - NSFALLRISKFACTORS_ED_ALL_ED
on blood thinner/Age: 85 years old or older/Coagulation: Bleeding disorder either through use of anticoagulants or underlying clinical condition(s)

## 2023-07-05 NOTE — H&P ADULT - NSHPLABSRESULTS_GEN_ALL_CORE
LABS:                        12.6   5.17  )-----------( 238      ( 05 Jul 2023 17:40 )             39.7     07-05    139  |  105  |  32<H>  ----------------------------<  104<H>  4.2   |  14<L>  |  1.28    Ca    9.7      05 Jul 2023 17:40  Mg     2.10     07-05    TPro  6.5  /  Alb  4.0  /  TBili  2.0<H>  /  DBili  x   /  AST  129<H>  /  ALT  272<H>  /  AlkPhos  109  07-05    PT/INR - ( 05 Jul 2023 17:40 )   PT: 23.0 sec;   INR: 1.97 ratio         PTT - ( 05 Jul 2023 17:40 )  PTT:31.2 sec

## 2023-07-05 NOTE — ED PROVIDER NOTE - OBJECTIVE STATEMENT
Patient is a 48y female with pmhx of HTN, CHF, Afib on Eliquis and metoprolol who presents to the ED for dizziness who was found to be in AFIB with RVR. Patient states that her medication was changed last week from carvedilol to metoprolol. However, she feels like it has given her dizziness and fatigue. Patient elected not to take her metoprolol dose today and also did not take her Eliquis yesterday. Patient was sent to cardiologist today who told her to take her home dose of metoprolol and present to the ED for evaluation. Patient also states she has had 1 week of diarrhea. At this time patient denies any chest pain, shortness of breath, fever, chills, night sweats, leg swelling.

## 2023-07-05 NOTE — CONSULT NOTE ADULT - SUBJECTIVE AND OBJECTIVE BOX
CARDIOLOGY FELLOW CONSULT NOTE    HPI:    She was last seen at [facility] 5/8; at that time presented w/ 2 weeks of nausea, fatigue and generalized weakness, shortly after a vacation in [place] with recreational alcohol binging. She has been transferred over to [facility] for a diagnostic LHC in the setting of NSTEMI (without EKG changes or chest pain) w/ HFrEF (negative prior stress test but never angiogram), and atrial fibrillation of unknown duration.  Cath was nonobstructive. She was discharged on entresto 24/26bid, carvedilol,   Patient is precontemplative to [facility]. I discussed with her at length. She is fearful, anxious, and wants further time to dwell on the decision. I further educated on benefits of maintaining sinus rhythm, including improved mortality and decreased morbidity from heart failure exacerbations. Patient would like to follow both general cardiology and EP as an outpatient here.    In the ED  HR 140s-170s BP 80/50s - 110s/80s  CBC unremarkable  Lactate 3.7     PMHx:   Hypertension  Asthma  Noncompliance  H/O cardiomyopathy  Chronic systolic congestive heart failure  H/O eczema    PSHx:   No significant past surgical history    Allergies:  penicillin (Other)  shellfish (Angioedema)    Home Meds:    Current Medications:   lactated ringers Bolus 1000 milliLiter(s) IV Bolus once    FAMILY HISTORY:  FH: heart disease  FH: HT/n (hypertension) (Father)  FHx: type 2 diabetes mellitus (Father)    Social History:  Smoking History:  Alcohol Use:  Drug Use:    REVIEW OF SYSTEMS:  CONSTITUTIONAL: No weakness, fevers or chills  EYES/ENT: No visual changes;  No dysphagia  NECK: No pain or stiffness  RESPIRATORY: No cough, wheezing, hemoptysis; No shortness of breath  CARDIOVASCULAR: No chest pain or palpitations; No lower extremity edema  GASTROINTESTINAL: No abdominal or epigastric pain. No nausea, vomiting, or hematemesis; No diarrhea or constipation. No melena or hematochezia.  BACK: No back pain  GENITOURINARY: No dysuria, frequency or hematuria  NEUROLOGICAL: No numbness or weakness  SKIN: No itching, burning, rashes, or lesions   All other review of systems is negative unless indicated above.    Physical Exam:  T(F): 98.2 (07-05), Max: 98.2 (07-05)  HR: 145 (07-05) (145 - 171)  BP: 102/81 (07-05) (89/57 - 133/106)  RR: 20 (07-05)  SpO2: 100% (07-05)  GENERAL: No acute distress, well-developed  HEAD:  Atraumatic, Normocephalic  ENT: EOMI, PERRLA, conjunctiva and sclera clear, Neck supple, No JVD, moist mucosa  CHEST/LUNG: Clear to auscultation bilaterally; No wheeze, equal breath sounds bilaterally   BACK: No spinal tenderness  HEART: Regular rate and rhythm; No murmurs, rubs, or gallops  ABDOMEN: Soft, Nontender, Nondistended; Bowel sounds present  EXTREMITIES:  No clubbing, cyanosis, or edema  PSYCH: Nl behavior, nl affect  NEUROLOGY: AAOx3, non-focal, cranial nerves intact  SKIN: Normal color, No rashes or lesions  LINES:    ECG: Personally reviewed    TTE 5/2023  EF 41%  CONCLUSIONS:  1. Normal mitral valve. Mild to moderate mitral  regurgitation.  2. Normal trileaflet aortic valve.  3. Aortic Root: 3.2 cm.  4. Severely dilated left atrium.  LA volume index = 54  cc/m2.  5. Normal left ventricular internal dimensions and wall  thicknesses.  6. Mild global left ventricular systolic dysfunction.  Paradoxical septal motion is seen, consistent with  conduction delay.  7. Unable to adequately assess diastolic function due to  technical aspects of this study.  8. Normal right atrium.  9. Normal right ventricular size and systolic function  (TAPSE  1.7cm).  10. Unable to estimate RVSP.  11. There is trace tricuspid regurgitation.  12. There is trace pulmonic regurgitation.  13. Normal pericardium with no pericardial effusion.    [facility] 5/2023    LM   Left main artery: Angiography shows mild atherosclerosis.      LAD   Left anterior descending artery: There is a 20 % stenosis.      CX   Circumflex: Angiography shows mild atherosclerosis.      RCA   Right coronary artery: There is a 20 % stenosis.      Labs: Personally reviewed   CARDIOLOGY FELLOW CONSULT NOTE    HPI:  48y female with pmhx of HTN, CHF, Afib on Eliquis and metoprolol who presents to the ED for dizziness who was found to be in AFIB with RVR. Patient states that her medication was changed last week from carvedilol to metoprolol (she was on maximum dose carvedilol but still not achieving rate control). She reports worsening dizziness and fatigue on this medication, and through the holiday weekend she has been noncompliant with her medications. ROS + 1 week of diarrhea  Currently, she is denying any chest pain, shortness of breath (although she is sometimes speaking in abbreviated phrases and appears winded). +cough when supine that is worsened. Denies any fevers, chills, night sweats, leg swelling.    Patient is familiar to me and the EP service; she was last seen at [facility] ; at that time presented w/ 2 weeks of nausea, fatigue and generalized weakness, shortly after a vacation in [place] with recreational alcohol binging. She had been transferred over to [facility] for a diagnostic LHC in the setting of NSTEMI (without EKG changes or chest pain) w/ HFrEF (negative prior stress test but never angiogram), and atrial fibrillation of unknown duration.  Cath was nonobstructive. Patient is precontemplative to [facility]. I discussed with her at length. Patient would like to follow both general cardiology and EP as an outpatient here.  She was discharged on entresto bid, carvedilol, and apixaban.    In the ED  HR 140s-170s BP 80/50s - 110s/80s  Received metoprolol 5mg IV x1, 1L LR bolus. HR persistently 130s-150s.   CBC unremarkable  Lactate 3.7   On exam she is cool. She is incredibly reluctant to receiving any catheter or swan mark procedure as her father  with these interventions in an ICU setting and it was incredibly traumatic to her. She is open and welcome to re-addressing need of these more invasive monitoring devices if she needs it. She again refuses now when I tried to convince her.    PMHx:   Hypertension  Asthma  Noncompliance  H/O cardiomyopathy  Chronic systolic congestive heart failure  H/O eczema    PSHx:   No significant past surgical history    Allergies:  penicillin (Other)  shellfish (Angioedema)    Current Medications:   lactated ringers Bolus 1000 milliLiter(s) IV Bolus once    FAMILY HISTORY:  FH: heart disease  FH: HT/n (hypertension) (Father)  FHx: type 2 diabetes mellitus (Father)    Social History:  Smoking History:  Alcohol Use:  Drug Use:    REVIEW OF SYSTEMS:  CONSTITUTIONAL: No weakness, fevers or chills  EYES/ENT: No visual changes;  No dysphagia  NECK: No pain or stiffness  RESPIRATORY: No cough, wheezing, hemoptysis; No shortness of breath  CARDIOVASCULAR: No chest pain or palpitations; No lower extremity edema  GASTROINTESTINAL: No abdominal or epigastric pain. No nausea, vomiting, or hematemesis; No diarrhea or constipation. No melena or hematochezia.  BACK: No back pain  GENITOURINARY: No dysuria, frequency or hematuria  NEUROLOGICAL: No numbness or weakness  SKIN: No itching, burning, rashes, or lesions   All other review of systems is negative unless indicated above.    Physical Exam:  T(F): 98.2 (), Max: 98.2 ()  HR: 145 () (145 - 171)  BP: 102/81 () (89/57 - 133/106)  RR: 20 ()  SpO2: 100% ()  GENERAL: No acute distress, well-developed  HEAD:  Atraumatic, Normocephalic  ENT: EOMI, PERRLA, conjunctiva and sclera clear, Neck supple, No JVD, moist mucosa  CHEST/LUNG: Clear to auscultation bilaterally; No wheeze, equal breath sounds bilaterally   BACK: No spinal tenderness  HEART: Regular rate and rhythm; No murmurs, rubs, or gallops  ABDOMEN: Soft, Nontender, Nondistended; Bowel sounds present  EXTREMITIES:  No clubbing, cyanosis, or edema  PSYCH: Nl behavior, nl affect  NEUROLOGY: AAOx3, non-focal, cranial nerves intact  SKIN: Normal color, No rashes or lesions    ECG: Personally reviewed    TTE 2023  EF 41%  CONCLUSIONS:  1. Normal mitral valve. Mild to moderate mitral  regurgitation.  2. Normal trileaflet aortic valve.  3. Aortic Root: 3.2 cm.  4. Severely dilated left atrium.  LA volume index = 54  cc/m2.  5. Normal left ventricular internal dimensions and wall  thicknesses.  6. Mild global left ventricular systolic dysfunction.  Paradoxical septal motion is seen, consistent with  conduction delay.  7. Unable to adequately assess diastolic function due to  technical aspects of this study.  8. Normal right atrium.  9. Normal right ventricular size and systolic function  (TAPSE  1.7cm).  10. Unable to estimate RVSP.  11. There is trace tricuspid regurgitation.  12. There is trace pulmonic regurgitation.  13. Normal pericardium with no pericardial effusion.    [facility] 2023    LM   Left main artery: Angiography shows mild atherosclerosis.      LAD   Left anterior descending artery: There is a 20 % stenosis.      CX   Circumflex: Angiography shows mild atherosclerosis.      RCA   Right coronary artery: There is a 20 % stenosis.      Labs: Personally reviewed   CARDIOLOGY FELLOW CONSULT NOTE    HPI:  48y female with pmhx of HTN, CHF, Afib on Eliquis and metoprolol who presents to the ED for dizziness who was found to be in AFIB with RVR. Patient states that her medication was changed last week from carvedilol to metoprolol (she was on maximum dose carvedilol but still not achieving rate control). She reports worsening dizziness and fatigue on this medication, and through the holiday weekend she has been noncompliant with her medications. ROS + 1 week of diarrhea  Currently, she is denying any chest pain, shortness of breath (although she is sometimes speaking in abbreviated phrases and appears winded). +cough when supine that is worsened. Denies any fevers, chills, night sweats, leg swelling.    Patient is familiar to me and the EP service; she was last seen at [facility] ; at that time presented w/ 2 weeks of nausea, fatigue and generalized weakness, shortly after a vacation in [place] with recreational alcohol binging. She had been transferred over to [facility] for a diagnostic LHC in the setting of NSTEMI (without EKG changes or chest pain) w/ HFrEF (negative prior stress test but never angiogram), and atrial fibrillation of unknown duration.  Cath was nonobstructive. Patient is precontemplative to [facility]. I discussed with her at length. Patient would like to follow both general cardiology and EP as an outpatient here.  She was discharged on entresto bid, carvedilol, and apixaban.    In the ED  HR 140s-170s BP 80/50s - 110s/80s  Received metoprolol 5mg IV x1, 1L LR bolus. HR persistently 130s-150s.   CBC unremarkable  Lactate 3.7   On exam she is cool. She is incredibly reluctant to receiving any catheter or swan mark procedure as her father  with these interventions in an ICU setting and it was incredibly traumatic to her. She is open and welcome to re-addressing need of these more invasive monitoring devices if she needs it. She again refuses now when I tried to convince her.    PMHx:   Hypertension  Asthma  Noncompliance  H/O cardiomyopathy  Chronic systolic congestive heart failure  H/O eczema    PSHx:   No significant past surgical history    Allergies:  penicillin (Other)  shellfish (Angioedema)    Current Medications:   lactated ringers Bolus 1000 milliLiter(s) IV Bolus once    FAMILY HISTORY:  FH: heart disease  FH: HT/n (hypertension) (Father)  FHx: type 2 diabetes mellitus (Father)    Social History:  Smoking History:  Alcohol Use:  Drug Use:    REVIEW OF SYSTEMS:  CONSTITUTIONAL: No weakness, fevers or chills  EYES/ENT: No visual changes;  No dysphagia  NECK: No pain or stiffness  RESPIRATORY: No cough, wheezing, hemoptysis; No shortness of breath  CARDIOVASCULAR: No chest pain or palpitations; No lower extremity edema  GASTROINTESTINAL: No abdominal or epigastric pain. No nausea, vomiting, or hematemesis; No diarrhea or constipation. No melena or hematochezia.  BACK: No back pain  GENITOURINARY: No dysuria, frequency or hematuria  NEUROLOGICAL: No numbness or weakness  SKIN: No itching, burning, rashes, or lesions   All other review of systems is negative unless indicated above.    Physical Exam:  T(F): 98.2 (), Max: 98.2 ()  HR: 145 () (145 - 171)  BP: 102/81 () (89/57 - 133/106)  RR: 20 ()  SpO2: 100% ()  GENERAL: Speaking in phrases, animated  HEAD:  Atraumatic, Normocephalic  ENT: JVD to ear lobe  CHEST/LUNG: Clear to auscultation bilaterally; No wheeze, equal breath sounds bilaterally   BACK: No spinal tenderness  HEART: Regular rate and rhythm; No murmurs, rubs, or gallops  ABDOMEN: Soft, Nontender, Nondistended; Bowel sounds present  EXTREMITIES: Cool. 1+edema to the shins  PSYCH: Nl behavior, nl affect  NEUROLOGY: AAOx3, non-focal, cranial nerves intact  SKIN: Normal color, No rashes or lesions    ECG: Afib w/ RVR    TTE 2023  EF 41%  CONCLUSIONS:  1. Normal mitral valve. Mild to moderate mitral  regurgitation.  2. Normal trileaflet aortic valve.  3. Aortic Root: 3.2 cm.  4. Severely dilated left atrium.  LA volume index = 54  cc/m2.  5. Normal left ventricular internal dimensions and wall  thicknesses.  6. Mild global left ventricular systolic dysfunction.  Paradoxical septal motion is seen, consistent with  conduction delay.  7. Unable to adequately assess diastolic function due to  technical aspects of this study.  8. Normal right atrium.  9. Normal right ventricular size and systolic function  (TAPSE  1.7cm).  10. Unable to estimate RVSP.  11. There is trace tricuspid regurgitation.  12. There is trace pulmonic regurgitation.  13. Normal pericardium with no pericardial effusion.    [facility] 2023    LM   Left main artery: Angiography shows mild atherosclerosis.      LAD   Left anterior descending artery: There is a 20 % stenosis.      CX   Circumflex: Angiography shows mild atherosclerosis.      RCA   Right coronary artery: There is a 20 % stenosis.      Labs: Personally reviewed

## 2023-07-05 NOTE — ED PROVIDER NOTE - PHYSICAL EXAMINATION
Physical Exam:  Gen: NAD, AOx3, non-toxic appearing, able to ambulate without assistance  Head: NCAT  HEENT: EOMI, PEERLA, normal conjunctiva, tongue midline, oral mucosa moist  Lung: CTAB, no respiratory distress, no wheezes/rhonchi/rales B/L, speaking in full sentences  CV: AFIB W/ RVR   Abd: soft, NT, ND, no guarding, no rigidity, no rebound tenderness, no CVA tenderness   MSK: no visible deformities, ROM normal in UE/LE, no back pain  Neuro: No focal sensory or motor deficits  Skin: Warm, well perfused, no rash, no leg swelling  Psych: normal affect, calm

## 2023-07-05 NOTE — H&P ADULT - NSHPPHYSICALEXAM_GEN_ALL_CORE
GENERAL: mildly anxious but no 2/3 word dyspnea   HEAD:  Atraumatic, normocephalic  EYES: EOMI, PERRL, conjunctiva and sclera clear  NECK: Supple, trachea midline  HEART: irreg rhythm   LUNGS: Unlabored respirations.  Clear to auscultation bilaterally, no crackles, wheezing, or rhonchi  ABDOMEN: Soft, nontender, nondistended,  EXTREMITIES: cool extremities with trace pitting edema bilat LE   NERVOUS SYSTEM:  A&Ox3, moving all extremities, no focal deficits

## 2023-07-05 NOTE — ED ADULT TRIAGE NOTE - CHIEF COMPLAINT QUOTE
pt aox4, speaking clear sentences. sent by cardiologist for rapid afib in office. pt has hx of afib, taking eliquis and recently changed from carvedilol to metoprolol. since the change pt has been feeling increasingly dizzy and nauseated. pt admits to not taking her meds today. pt denies dizziness, chest pain, sob at this time. medical hx: afib, CHF, HTN, asthma. unable to obtain BP at this time.

## 2023-07-05 NOTE — ED ADULT NURSE NOTE - OBJECTIVE STATEMENT
Lala RN: Patient is a 48-year-old female, A&OX3, ambulatory at baseline self care presenting to the ED from cardiologist office in rapid Afib, HR upon arrival 160's-170's. Placed on cardiac monitor and Zoll monitoring. Denies any pain at this time. Denies dizziness, chest pain, nausea, vomiting, headache. Pt speaking in full sentences, appears comfortable. Breathing even and unlabored, chest rise equal b/l. 20 G IVL placed in L wrist area. Pt has a Hx of HTN, Afib, CHF, asthma. Bed set in lowest position. Safety being maintained. Dr. Blankenship at bedside.

## 2023-07-05 NOTE — CONSULT NOTE ADULT - ASSESSMENT
48y female with pmhx of HTN, CHF, Afib on Eliquis and metoprolol who presents to the ED for dizziness who was found to be in AFIB with RVR and acute decompensated heart failure.   She has concerning shock indices including elevated LFTs, and lactate 3.7.    Impression:  Af w/ RVR (foregone doses of eliquis and metoprolol)  AdHF  Hypertension, now relatively hypotensive  medication noncompliance    Plan:  - Triage to CICU; Discussed w/ EP   - Digoxin 500mcg IVx1, 250mcg q6h x2 doses thereafter for 1 g load  - Bumex 1mg, net goal 1-2L. bumex gtt if not responsive  - No Beta blockade in the setting of concern developing cardiogenic shock  - In the setting of hypotension with Afib w/ RVR, patient should be cardioverted.   - I engaged in a long discussion with patient down in the ED as to the standard of care for her, given her tenuous hemodynamics. She adamantly refused CVL/[place] mark at this moment in time, as she had a traumatic experience from several family members that  with them in. I told her that if she were to experience further decompensation, it is important to revisit this conversation 
48y female with pmhx of HTN, CHF, Afib on Eliquis and metoprolol who presents to the ED for dizziness who was found to be in AFIB with RVR and acute decompensated heart failure.   She has concerning shock indices including elevated LFTs, and lactate 3.7.    Impression:  Af w/ RVR (foregone doses of eliquis and metoprolol)  AdHF  Hypertension, now relatively hypotensive  medication noncompliance    Plan:  - Triage to CICU; Discussed w/ EP   - Digoxin 500mcg IVx1, 250mcg q6h x2 doses thereafter for 1 g load  - Bumex 1mg, net goal 1-2L. bumex gtt if not responsive  - No Beta blockade in the setting of concern developing cardiogenic shock  - In the setting of hypotension with Afib w/ RVR, patient should be cardioverted.   - I engaged in a long discussion with patient down in the ED as to the standard of care for her, given her tenuous hemodynamics. She adamantly refused CVL/[place] at this moment in time, as she had a traumatic experience from several family members that  with them in. I told her that if she were to experience further decompensation, it is important to revisit this conversation

## 2023-07-06 LAB
ALBUMIN SERPL ELPH-MCNC: 4.2 G/DL — SIGNIFICANT CHANGE UP (ref 3.3–5)
ALP SERPL-CCNC: 114 U/L — SIGNIFICANT CHANGE UP (ref 40–120)
ALT FLD-CCNC: 271 U/L — HIGH (ref 4–33)
ANION GAP SERPL CALC-SCNC: 18 MMOL/L — HIGH (ref 7–14)
APTT BLD: 29.6 SEC — SIGNIFICANT CHANGE UP (ref 27–36.3)
AST SERPL-CCNC: 130 U/L — HIGH (ref 4–32)
BASE EXCESS BLDV CALC-SCNC: -5.8 MMOL/L — LOW (ref -2–3)
BILIRUB DIRECT SERPL-MCNC: 0.8 MG/DL — HIGH (ref 0–0.3)
BILIRUB INDIRECT FLD-MCNC: 1.2 MG/DL — HIGH (ref 0–1)
BILIRUB SERPL-MCNC: 2 MG/DL — HIGH (ref 0.2–1.2)
BLOOD GAS VENOUS COMPREHENSIVE RESULT: SIGNIFICANT CHANGE UP
BUN SERPL-MCNC: 34 MG/DL — HIGH (ref 7–23)
CALCIUM SERPL-MCNC: 9.7 MG/DL — SIGNIFICANT CHANGE UP (ref 8.4–10.5)
CHLORIDE BLDV-SCNC: 105 MMOL/L — SIGNIFICANT CHANGE UP (ref 96–108)
CHLORIDE SERPL-SCNC: 105 MMOL/L — SIGNIFICANT CHANGE UP (ref 98–107)
CO2 BLDV-SCNC: 19.5 MMOL/L — LOW (ref 22–26)
CO2 SERPL-SCNC: 16 MMOL/L — LOW (ref 22–31)
CREAT SERPL-MCNC: 1.28 MG/DL — SIGNIFICANT CHANGE UP (ref 0.5–1.3)
EGFR: 52 ML/MIN/1.73M2 — LOW
GAS PNL BLDV: 136 MMOL/L — SIGNIFICANT CHANGE UP (ref 136–145)
GLUCOSE BLDV-MCNC: 114 MG/DL — HIGH (ref 70–99)
GLUCOSE SERPL-MCNC: 114 MG/DL — HIGH (ref 70–99)
HCG UR QL: NEGATIVE — SIGNIFICANT CHANGE UP
HCO3 BLDV-SCNC: 18 MMOL/L — LOW (ref 22–29)
HCT VFR BLD CALC: 39.7 % — SIGNIFICANT CHANGE UP (ref 34.5–45)
HCT VFR BLDA CALC: 41 % — SIGNIFICANT CHANGE UP (ref 34.5–46.5)
HGB BLD CALC-MCNC: 13.6 G/DL — SIGNIFICANT CHANGE UP (ref 11.7–16.1)
HGB BLD-MCNC: 13.7 G/DL — SIGNIFICANT CHANGE UP (ref 11.5–15.5)
INR BLD: 1.79 RATIO — HIGH (ref 0.88–1.16)
LACTATE BLDV-MCNC: 1.5 MMOL/L — SIGNIFICANT CHANGE UP (ref 0.5–2)
MAGNESIUM SERPL-MCNC: 2.5 MG/DL — SIGNIFICANT CHANGE UP (ref 1.6–2.6)
MCHC RBC-ENTMCNC: 34.5 GM/DL — SIGNIFICANT CHANGE UP (ref 32–36)
MCHC RBC-ENTMCNC: 34.6 PG — HIGH (ref 27–34)
MCV RBC AUTO: 100.3 FL — HIGH (ref 80–100)
NRBC # BLD: 0 /100 WBCS — SIGNIFICANT CHANGE UP (ref 0–0)
NRBC # FLD: 0.05 K/UL — HIGH (ref 0–0)
NT-PROBNP SERPL-SCNC: 3116 PG/ML — HIGH
PCO2 BLDV: 32 MMHG — LOW (ref 39–52)
PH BLDV: 7.37 — SIGNIFICANT CHANGE UP (ref 7.32–7.43)
PHOSPHATE SERPL-MCNC: 4 MG/DL — SIGNIFICANT CHANGE UP (ref 2.5–4.5)
PLATELET # BLD AUTO: 253 K/UL — SIGNIFICANT CHANGE UP (ref 150–400)
PO2 BLDV: 66 MMHG — HIGH (ref 25–45)
POTASSIUM BLDV-SCNC: 4 MMOL/L — SIGNIFICANT CHANGE UP (ref 3.5–5.1)
POTASSIUM SERPL-MCNC: 4.1 MMOL/L — SIGNIFICANT CHANGE UP (ref 3.5–5.3)
POTASSIUM SERPL-SCNC: 4.1 MMOL/L — SIGNIFICANT CHANGE UP (ref 3.5–5.3)
PROT SERPL-MCNC: 7.3 G/DL — SIGNIFICANT CHANGE UP (ref 6–8.3)
PROTHROM AB SERPL-ACNC: 20.9 SEC — HIGH (ref 10.5–13.4)
RBC # BLD: 3.96 M/UL — SIGNIFICANT CHANGE UP (ref 3.8–5.2)
RBC # FLD: 17 % — HIGH (ref 10.3–14.5)
SAO2 % BLDV: 91.4 % — HIGH (ref 67–88)
SODIUM SERPL-SCNC: 139 MMOL/L — SIGNIFICANT CHANGE UP (ref 135–145)
WBC # BLD: 5.34 K/UL — SIGNIFICANT CHANGE UP (ref 3.8–10.5)
WBC # FLD AUTO: 5.34 K/UL — SIGNIFICANT CHANGE UP (ref 3.8–10.5)

## 2023-07-06 PROCEDURE — 99232 SBSQ HOSP IP/OBS MODERATE 35: CPT

## 2023-07-06 PROCEDURE — 99233 SBSQ HOSP IP/OBS HIGH 50: CPT

## 2023-07-06 PROCEDURE — 93306 TTE W/DOPPLER COMPLETE: CPT | Mod: 26

## 2023-07-06 RX ORDER — AMIODARONE HYDROCHLORIDE 400 MG/1
400 TABLET ORAL EVERY 8 HOURS
Refills: 0 | Status: DISCONTINUED | OUTPATIENT
Start: 2023-07-06 | End: 2023-07-07

## 2023-07-06 RX ORDER — BUMETANIDE 0.25 MG/ML
1 INJECTION INTRAMUSCULAR; INTRAVENOUS ONCE
Refills: 0 | Status: COMPLETED | OUTPATIENT
Start: 2023-07-06 | End: 2023-07-06

## 2023-07-06 RX ORDER — APIXABAN 2.5 MG/1
5 TABLET, FILM COATED ORAL EVERY 12 HOURS
Refills: 0 | Status: DISCONTINUED | OUTPATIENT
Start: 2023-07-06 | End: 2023-07-12

## 2023-07-06 RX ORDER — BENZOCAINE AND MENTHOL 5; 1 G/100ML; G/100ML
1 LIQUID ORAL
Refills: 0 | Status: DISCONTINUED | OUTPATIENT
Start: 2023-07-06 | End: 2023-07-12

## 2023-07-06 RX ORDER — FUROSEMIDE 40 MG
20 TABLET ORAL DAILY
Refills: 0 | Status: DISCONTINUED | OUTPATIENT
Start: 2023-07-06 | End: 2023-07-07

## 2023-07-06 RX ORDER — AMIODARONE HYDROCHLORIDE 400 MG/1
TABLET ORAL
Refills: 0 | Status: DISCONTINUED | OUTPATIENT
Start: 2023-07-06 | End: 2023-07-07

## 2023-07-06 RX ORDER — SACUBITRIL AND VALSARTAN 24; 26 MG/1; MG/1
1 TABLET, FILM COATED ORAL
Refills: 0 | Status: DISCONTINUED | OUTPATIENT
Start: 2023-07-06 | End: 2023-07-09

## 2023-07-06 RX ADMIN — AMIODARONE HYDROCHLORIDE 400 MILLIGRAM(S): 400 TABLET ORAL at 17:30

## 2023-07-06 RX ADMIN — SACUBITRIL AND VALSARTAN 1 TABLET(S): 24; 26 TABLET, FILM COATED ORAL at 09:04

## 2023-07-06 RX ADMIN — APIXABAN 5 MILLIGRAM(S): 2.5 TABLET, FILM COATED ORAL at 09:04

## 2023-07-06 RX ADMIN — Medication 250 MICROGRAM(S): at 01:22

## 2023-07-06 RX ADMIN — AMIODARONE HYDROCHLORIDE 400 MILLIGRAM(S): 400 TABLET ORAL at 23:33

## 2023-07-06 RX ADMIN — APIXABAN 5 MILLIGRAM(S): 2.5 TABLET, FILM COATED ORAL at 20:43

## 2023-07-06 RX ADMIN — CHLORHEXIDINE GLUCONATE 1 APPLICATION(S): 213 SOLUTION TOPICAL at 06:05

## 2023-07-06 RX ADMIN — BENZOCAINE AND MENTHOL 1 LOZENGE: 5; 1 LIQUID ORAL at 23:37

## 2023-07-06 RX ADMIN — BUMETANIDE 1 MILLIGRAM(S): 0.25 INJECTION INTRAMUSCULAR; INTRAVENOUS at 01:21

## 2023-07-06 RX ADMIN — Medication 20 MILLIGRAM(S): at 11:44

## 2023-07-06 RX ADMIN — SACUBITRIL AND VALSARTAN 1 TABLET(S): 24; 26 TABLET, FILM COATED ORAL at 17:30

## 2023-07-06 RX ADMIN — AMIODARONE HYDROCHLORIDE 400 MILLIGRAM(S): 400 TABLET ORAL at 09:21

## 2023-07-06 NOTE — PATIENT PROFILE ADULT - NSPROPTRIGHTCAREGIVER_GEN_A_NUR
[id number]/1     Marcia Painter MRN: [id number]  AGE: 53 year old  ADMIT DATE: 4/10/2022    CODE STATUS: Full Resuscitation  ISOLATION STATUS: No active isolations   DIET: Consistent Carb Moderate (45-75 Gm/meal) Diet    ALLERGIES:  Sulfa antibiotics     DX:Uncontrolled type 2 diabetes mellitus with hyperglycemia (CMS/[facility])  (primary encounter diagnosis)  Metabolic acidosis  Lightheaded  Hyponatremia  Hypercalcemia  Primary hypertension  Hyperlipidemia, unspecified hyperlipidemia type  Regular alcohol consumption     Att: Tawnya Barney DO  PCP: Gary Key MD  IP Consult Orders (From admission, onward)                 Start     Ordered    04/10/22 1807  Inpatient consult to Endocrinology  ONE TIME        Provider:  Theo Grant MD    04/10/22 1807                        BP: 138/86  Temp: 98.1 °F (36.7 °C)  Temp src: Oral  Heart Rate: 67  Resp: 18  SpO2: 99 %  Height: 5' 9\" (175.3 cm)  Weight: 109.1 kg (240 lb 8.4 oz)   Weight change:      Recent Labs   Lab 04/11/22  0730 04/11/22  1133 04/11/22  1641 04/11/22  2120   GLUCOSE BEDSIDE 340* 327* 368* 333*        Creatinine (mg/dL)   Date Value   04/11/2022 0.95   04/10/2022 0.97     WBC (K/mcL)   Date Value   04/11/2022 8.1   04/10/2022 9.1        I/O last 3 completed shifts:  In: 100 [P.O.:100]  Out: -                          IMPORTANT EVENTS THIS SHIFT:  No acute changes overnight. Blood sugars elevated, insulin per orders. IMPORTANT EVENTS COMING UP/GOALS (PLEASE INCLUDE WHITE BOARD AND DISCHARGE BOARD UPDATES):       PATIENT SPECIAL NEEDS/ACCOMMODATIONS:                                   Problem: Pain  Goal: #Acceptable pain level achieved/maintained at rest using NRS/Faces  Description: This goal is used for patients who can self-report.  Acceptable means the level is at or below the identified comfort/function goal.  Outcome: Outcome Met, Continue evaluating goal progress toward completion  Goal: # Acceptable pain level achieved/maintained with activity  using NRS/Faces  Description: This goal is used for patients who can self-report and are not achieving acceptable pain control during activity.  Outcome: Outcome Met, Continue evaluating goal progress toward completion  Goal: # Verbalizes understanding of pain management  Description: Documented in Patient Education Activity  Outcome: Outcome Met, Continue evaluating goal progress toward completion     Problem: Diabetes  Goal: Glycemic balance achieved/maintained  Description: Goal is to maintain blood sugar within range with no episodes of hypoglycemia  Outcome: Outcome Not Met, Continue to Monitor  Goal: Verbalizes/demonstrates understanding of Diabetes  Description: Document on Patient Education Activity  Outcome: Outcome Not Met, Continue to Monitor  Goal: Demonstrates ability to self-administer insulin  Description: Document on Patient Education Activity  Outcome: Outcome Not Met, Continue to Monitor      no

## 2023-07-06 NOTE — PROGRESS NOTE ADULT - SUBJECTIVE AND OBJECTIVE BOX
Subjective: Denies HA, lightheadedness, dizziness, CP, palpitation, SOB, abdominal pain, and N/V.      Interval Events:   -Presents to the ED for dizziness who was found to be in AFIB with RVR and in ADHF. Patient states that her medication was changed last week from carvedilol to metoprolol (she was on maximum dose carvedilol but still not achieving rate control). She reports worsening dizziness and fatigue on this medication, and through the holiday weekend she has been noncompliant with her medications.Patient received metoprolol 5mg IV x1, 1L LR bolus in the ED  - Of note, patient was seen at [facility] 5/8 for nausea, fatigue and generalized weakness, shortly after a vacation in [place] with recreational alcohol binging. She had been transferred over to [facility] for a diagnostic LHC in the setting of NSTEMI (without EKG changes or chest pain) w/ HFrEF (negative prior stress test but never angiogram), and atrial fibrillation of unknown duration. Cath was nonobstructive. Patient is precontemplative to DCCV. She was discharged on entresto 24/26bid, carvedilol, and apixaban.  - Ep was consulted for rapid Afib and was digoxin load. Ep now is recommended amiodarone load and TROY/DCCV. Patient is agreeable to DCCV.       MEDICATIONS  (STANDING):  aMIOdarone    Tablet   Oral   aMIOdarone    Tablet 400 milliGRAM(s) Oral every 8 hours  apixaban 5 milliGRAM(s) Oral every 12 hours  chlorhexidine 2% Cloths 1 Application(s) Topical <User Schedule>  furosemide    Tablet 20 milliGRAM(s) Oral daily  sacubitril 24 mG/valsartan 26 mG 1 Tablet(s) Oral two times a day    MEDICATIONS  (PRN):      Vital Signs Last 24 Hrs  T(C): 37.2 (06 Jul 2023 08:00), Max: 37.2 (06 Jul 2023 08:00)  T(F): 98.9 (06 Jul 2023 08:00), Max: 98.9 (06 Jul 2023 08:00)  HR: 155 (06 Jul 2023 08:00) (120 - 171)  BP: 119/93 (06 Jul 2023 10:00) (89/57 - 163/139)  BP(mean): 103 (06 Jul 2023 10:00) (89 - 148)  RR: 20 (06 Jul 2023 10:00) (17 - 33)  SpO2: 97% (06 Jul 2023 10:00) (96% - 100%)    Parameters below as of 06 Jul 2023 11:00  Patient On (Oxygen Delivery Method): room air      I&O's Detail    05 Jul 2023 07:01  -  06 Jul 2023 07:00  --------------------------------------------------------  IN:  Total IN: 0 mL    OUT:    Voided (mL): 4000 mL  Total OUT: 4000 mL    Total NET: -4000 mL      06 Jul 2023 07:01  -  06 Jul 2023 11:15  --------------------------------------------------------  IN:    Oral Fluid: 240 mL  Total IN: 240 mL    OUT:    Voided (mL): 500 mL  Total OUT: 500 mL    Total NET: -260 mL          Physical Exam:  GENERAL: Lying in bed, well appearing, speaking in full sentence, in NAD  HEART: S1S2 tachycardia irregularly irregular   PULMONARY: CTABL, normal respiratory effort  ABDOMEN: + Bowel sounds present, soft  EXTREMITIES:  Warm, well -perfused, trace LE edema, distal pulses present  NEUROLOGICAL:AOx3 , no focal deficit appreciated     TELEMETERIC: Afib with RVR                            13.7   5.34  )-----------( 253      ( 06 Jul 2023 02:10 )             39.7     PT/INR - ( 06 Jul 2023 02:10 )   PT: 20.9 sec;   INR: 1.79 ratio         PTT - ( 06 Jul 2023 02:10 )  PTT:29.6 sec  07-06    139  |  105  |  34<H>  ----------------------------<  114<H>  4.1   |  16<L>  |  1.28    Ca    9.7      06 Jul 2023 02:10  Phos  4.0     07-06  Mg     2.50     07-06    TPro  6.5  /  Alb  4.0  /  TBili  2.0<H>  /  DBili  x   /  AST  129<H>  /  ALT  272<H>  /  AlkPhos  109  07-05    < from: Transthoracic Echocardiogram (05.05.23 @ 07:41) >  DIMENSIONS:  Dimensions:     Normal Values:  LA:     4.7 cm    2.0 - 4.0 cm  Ao:     3.2 cm    2.0 - 3.8 cm  SEPTUM: 0.8 cm    0.6 - 1.2 cm  PWT:    0.7 cm    0.6 - 1.1 cm  LVIDd:  4.7 cm    3.0 - 5.6 cm  LVIDs:  4.0 cm    1.8 - 4.0 cm      Derived Variables:  LVMI: 53 g/m2  RWT: 0.29  Ejection Fraction Visual Estimate: 40-45 %  Ejection Fraction Arreola: 41 %    ------------------------------------------------------------------------  OBSERVATIONS:  Mitral Valve: Normal mitral valve. Mild to moderate mitral  regurgitation.  Aortic Root: Aortic Root: 3.2 cm.    Aortic Valve: Normal trileaflet aortic valve.  Left Atrium: Severely dilated left atrium.  LA volume index  = 54 cc/m2.  Left Ventricle: Mild global left ventricular systolic  dysfunction. Paradoxical septal motion is seen, consistent  with conduction delay. Normal left ventricular internal  dimensions and wall thicknesses. Unable to adequately  assess diastolic function due to technical aspects of this  study.  Right Heart: Normal right atrium. Normal right ventricular  size and systolic function (TAPSE  1.7cm). There is trace  tricuspid regurgitation. There is trace pulmonic  regurgitation.  Pericardium/PleuraNormal pericardium with no pericardial  effusion.  Hemodynamic: Unable to estimate RVSP.  ------------------------------------------------------------------------  CONCLUSIONS:  1. Normal mitral valve. Mild to moderate mitral  regurgitation.  2. Normal trileaflet aortic valve.  3. Aortic Root: 3.2 cm.  4. Severely dilated left atrium.  LA volume index = 54  cc/m2.  5. Normal left ventricular internal dimensions and wall  thicknesses.  6. Mild global left ventricular systolic dysfunction.  Paradoxical septal motion is seen, consistent with  conduction delay.  7. Unable to adequately assess diastolic function due to  technical aspects of this study.  8. Normal right atrium.  9. Normal right ventricular size and systolic function  (TAPSE  1.7cm).  10. Unable to estimate RVSP.  11. There is trace tricuspid regurgitation.  12. There is trace pulmonic regurgitation.  13. Normal pericardium with no pericardial effusion.    ------------------------------------------------------------------------  Confirmed on  5/5/2023 - 17:10:26 by Geena Jensen MD  ------------------------------------------------------------------------    < end of copied text >             Subjective: Denies HA, lightheadedness, dizziness, CP, palpitation, SOB, abdominal pain, and N/V.      Interval Events:   -Presents to the ED for dizziness who was found to be in AFIB with RVR and in ADHF. Patient states that her medication was changed last week from carvedilol to metoprolol (she was on maximum dose carvedilol but still not achieving rate control). She reports worsening dizziness and fatigue on this medication, and through the holiday weekend she has been noncompliant with her medications.Patient received metoprolol 5mg IV x1, 1L LR bolus in the ED  - Of note, patient was seen at [facility] 5/8 for nausea, fatigue and generalized weakness, shortly after a vacation in [place] with recreational alcohol binging. She had been transferred over to [facility] for a diagnostic LHC in the setting of NSTEMI (without EKG changes or chest pain) w/ HFrEF (negative prior stress test but never angiogram), and atrial fibrillation of unknown duration. Cath was nonobstructive. Patient is precontemplative to DCCV. She was discharged on entresto 24/26bid, carvedilol, and apixaban.  - Ep was consulted for rapid Afib and was digoxin load. Ep now is recommended amiodarone load and TROY/DCCV. Patient is agreeable to DCCV. The risks and benefits was explained in detail which included but not limited to bleeding requiring transfusion, infection, stroke, plural effusion, esophageal injury, pericardial effusion, and intubation. Patient expressed understanding and all questions were answered.     MEDICATIONS  (STANDING):  aMIOdarone    Tablet   Oral   aMIOdarone    Tablet 400 milliGRAM(s) Oral every 8 hours  apixaban 5 milliGRAM(s) Oral every 12 hours  chlorhexidine 2% Cloths 1 Application(s) Topical <User Schedule>  furosemide    Tablet 20 milliGRAM(s) Oral daily  sacubitril 24 mG/valsartan 26 mG 1 Tablet(s) Oral two times a day    MEDICATIONS  (PRN):      Vital Signs Last 24 Hrs  T(C): 37.2 (06 Jul 2023 08:00), Max: 37.2 (06 Jul 2023 08:00)  T(F): 98.9 (06 Jul 2023 08:00), Max: 98.9 (06 Jul 2023 08:00)  HR: 155 (06 Jul 2023 08:00) (120 - 171)  BP: 119/93 (06 Jul 2023 10:00) (89/57 - 163/139)  BP(mean): 103 (06 Jul 2023 10:00) (89 - 148)  RR: 20 (06 Jul 2023 10:00) (17 - 33)  SpO2: 97% (06 Jul 2023 10:00) (96% - 100%)    Parameters below as of 06 Jul 2023 11:00  Patient On (Oxygen Delivery Method): room air      I&O's Detail    05 Jul 2023 07:01  -  06 Jul 2023 07:00  --------------------------------------------------------  IN:  Total IN: 0 mL    OUT:    Voided (mL): 4000 mL  Total OUT: 4000 mL    Total NET: -4000 mL      06 Jul 2023 07:01  -  06 Jul 2023 11:15  --------------------------------------------------------  IN:    Oral Fluid: 240 mL  Total IN: 240 mL    OUT:    Voided (mL): 500 mL  Total OUT: 500 mL    Total NET: -260 mL          Physical Exam:  GENERAL: Lying in bed, well appearing, speaking in full sentence, in NAD  HEART: S1S2 tachycardia irregularly irregular   PULMONARY: CTABL, normal respiratory effort  ABDOMEN: + Bowel sounds present, soft  EXTREMITIES:  Warm, well -perfused, trace LE edema, distal pulses present  NEUROLOGICAL:AOx3 , no focal deficit appreciated     TELEMETERIC: Afib with RVR                            13.7   5.34  )-----------( 253      ( 06 Jul 2023 02:10 )             39.7     PT/INR - ( 06 Jul 2023 02:10 )   PT: 20.9 sec;   INR: 1.79 ratio         PTT - ( 06 Jul 2023 02:10 )  PTT:29.6 sec  07-06    139  |  105  |  34<H>  ----------------------------<  114<H>  4.1   |  16<L>  |  1.28    Ca    9.7      06 Jul 2023 02:10  Phos  4.0     07-06  Mg     2.50     07-06    TPro  6.5  /  Alb  4.0  /  TBili  2.0<H>  /  DBili  x   /  AST  129<H>  /  ALT  272<H>  /  AlkPhos  109  07-05    < from: Transthoracic Echocardiogram (05.05.23 @ 07:41) >  DIMENSIONS:  Dimensions:     Normal Values:  LA:     4.7 cm    2.0 - 4.0 cm  Ao:     3.2 cm    2.0 - 3.8 cm  SEPTUM: 0.8 cm    0.6 - 1.2 cm  PWT:    0.7 cm    0.6 - 1.1 cm  LVIDd:  4.7 cm    3.0 - 5.6 cm  LVIDs:  4.0 cm    1.8 - 4.0 cm      Derived Variables:  LVMI: 53 g/m2  RWT: 0.29  Ejection Fraction Visual Estimate: 40-45 %  Ejection Fraction Arreola: 41 %    ------------------------------------------------------------------------  OBSERVATIONS:  Mitral Valve: Normal mitral valve. Mild to moderate mitral  regurgitation.  Aortic Root: Aortic Root: 3.2 cm.    Aortic Valve: Normal trileaflet aortic valve.  Left Atrium: Severely dilated left atrium.  LA volume index  = 54 cc/m2.  Left Ventricle: Mild global left ventricular systolic  dysfunction. Paradoxical septal motion is seen, consistent  with conduction delay. Normal left ventricular internal  dimensions and wall thicknesses. Unable to adequately  assess diastolic function due to technical aspects of this  study.  Right Heart: Normal right atrium. Normal right ventricular  size and systolic function (TAPSE  1.7cm). There is trace  tricuspid regurgitation. There is trace pulmonic  regurgitation.  Pericardium/PleuraNormal pericardium with no pericardial  effusion.  Hemodynamic: Unable to estimate RVSP.  ------------------------------------------------------------------------  CONCLUSIONS:  1. Normal mitral valve. Mild to moderate mitral  regurgitation.  2. Normal trileaflet aortic valve.  3. Aortic Root: 3.2 cm.  4. Severely dilated left atrium.  LA volume index = 54  cc/m2.  5. Normal left ventricular internal dimensions and wall  thicknesses.  6. Mild global left ventricular systolic dysfunction.  Paradoxical septal motion is seen, consistent with  conduction delay.  7. Unable to adequately assess diastolic function due to  technical aspects of this study.  8. Normal right atrium.  9. Normal right ventricular size and systolic function  (TAPSE  1.7cm).  10. Unable to estimate RVSP.  11. There is trace tricuspid regurgitation.  12. There is trace pulmonic regurgitation.  13. Normal pericardium with no pericardial effusion.    ------------------------------------------------------------------------  Confirmed on  5/5/2023 - 17:10:26 by Geena Jensen MD  ------------------------------------------------------------------------    < end of copied text >

## 2023-07-06 NOTE — PROGRESS NOTE ADULT - ASSESSMENT
48y female with PHMx: of HTN, HF (EF 41%), Afib on Eliquis and metoprolol who presents to the ED for dizziness who was found to be in AFIB with RVR and ADHF 2/2 med noncompliance. Admitted to CCU for close HD monitoring.     Neuro:  no issues   - A/Ox3  - No issues     Resp:  - Not tachypneic   - On RA  - Patient was given Lasix     Cardiac:   A fib with RVR  - In  setting of med noncompliance   -  ECG with afib with RVR without ischemic changes. trops neg.  - HR 150s-160s with BP now 110s-130s but with cool extremities   - S/p lopressor x1 without effect  - Start with Digoxin load in setting of HFrEF 500 now and 250q6 as per EP  - Previous TSH wnl   - TROY/DCCV Friday   - Zolls at bedside  - Resumed Apixaban   - HD stable  - MAP goal>65    Acute systolic heart failure   - Likely tachy induced   - NICM  - HFrEF (EF 41%) on home diuretics.  - Cath 5/23 non obstructive   - s/p bumex in ED. cont with diuresis as needed  - Elevated lactate in setting of low flow state. Cont to trend with control of HR and diuresis  - Follow up repeat TTE (done)    GI:  - NPO after MN  - Continue diet   - Transaminitis in setting of poor perfusion  - Cont to trend LFTS with perfusion markers     Renal:  GREY   - likely in setting of volume overload. clinically with LE edema   - s/p 1 L in ED and then diuresed. diuresis as above   - Cont to trend crn with diuresis   - replete lytes as appropriate     ID:  - Sfebrile, wbc nl  - Cont to monitor off abx     Heme:  - Cont home eliquis for A/C for afib   - cbc stable    Endo:  - Previous TSH wnl      48y female with PHMx: of HTN, HF (EF 41%), Afib on Eliquis and metoprolol who presents to the ED for dizziness who was found to be in AFIB with RVR and ADHF 2/2 med noncompliance. Admitted to CCU for close HD monitoring.     Neuro:  no issues   - A/Ox3  - No issues     Resp:  - Not tachypneic   - On RA  - Patient was given Lasix     Cardiac:   A fib with RVR  - In  setting of med noncompliance   -  ECG with afib with RVR without ischemic changes. trops neg.  - HR 150s-160s with BP now 110s-130s but with cool extremities   - S/p lopressor x1 without effect  - Start with Digoxin load in setting of HFrEF 500 now and 250q6 as per EP  - Previous TSH wnl   - TROY/DCCV Friday   - Zolls at bedside  - CHADS-VAS 3  - Resumed Apixaban   - HD stable  - MAP goal>65    Acute systolic heart failure   - Likely tachy induced   - NICM  - HFrEF (EF 41%) on home diuretics.  - Cath 5/23 non obstructive   - s/p bumex in ED. cont with diuresis as needed  - Elevated lactate in setting of low flow state. Cont to trend with control of HR and diuresis  - Follow up repeat TTE (done)    GI:  - NPO after MN  - Continue diet   - Transaminitis in setting of poor perfusion  - Cont to trend LFTS with perfusion markers     Renal:  GREY   - likely in setting of volume overload. clinically with LE edema   - s/p 1 L in ED and then diuresed. diuresis as above   - Cont to trend crn with diuresis   - replete lytes as appropriate     ID:  - Sfebrile, wbc nl  - Cont to monitor off abx     Heme:  - Cont home eliquis for A/C for afib   - cbc stable    Endo:  - Previous TSH wnl      48y female with PHMx: of HTN, HF (EF 41%), Afib on Eliquis and metoprolol who presents to the ED for dizziness who was found to be in AFIB with RVR and ADHF 2/2 med noncompliance. Admitted to CCU for close HD monitoring.     Neuro:  no issues   - A/Ox3  - No issues     Resp:  - Not tachypneic   - On RA  - Patient was given Lasix     Cardiac:   A fib with RVR  - In  setting of med noncompliance   -  ECG with afib with RVR without ischemic changes. trops neg.  - HR 150s-160s with BP now 110s-130s but with cool extremities   - S/p lopressor x1 without effect  - Start with Digoxin load in setting of HFrEF 500 now and 250q6 as per EP  - Previous TSH wnl   - TROY/DCCV Friday   - Zolls at bedside  - CHADS-VAS 3  - Resumed Apixaban   - HD stable  - MAP goal>65    Acute on chronic systolic heart failure   - Likely tachy induced   - NICM  - HFrEF (EF 41%) on home diuretics.  - Cath 5/23 non obstructive  - Resumed Entresto    - s/p bumex in ED. cont with diuresis as needed  - Elevated lactate in setting of low flow state. Cont to trend with control of HR and diuresis  - Follow up repeat TTE (done)    GI:  - NPO after MN  - Continue diet   - Transaminitis in setting of poor perfusion  - Cont to trend LFTS with perfusion markers     Renal:  GREY   - likely in setting of volume overload. clinically with LE edema   - s/p 1 L in ED and then diuresed. diuresis as above   - Cont to trend crn with diuresis   - replete lytes as appropriate     ID:  - Sfebrile, wbc nl  - Cont to monitor off abx     Heme:  - Cont home eliquis for A/C for afib   - cbc stable    Endo:  - Previous TSH wnl

## 2023-07-06 NOTE — PROGRESS NOTE ADULT - SUBJECTIVE AND OBJECTIVE BOX
Sarath Salazar NP  CCU Service  Cardiology   Spect: [id number] ([facility])     PATIENT: SANTY BREAUX, MRN: [id number]    CHIEF COMPLAINT: Patient is a 48y old  Female who presents with a chief complaint of afib with rvr (05 Jul 2023 19:44)      INTERVAL HISTORY/OVERNIGHT EVENTS:  Remains in rapid AF 150s. Denies abdominal pain, chest pain or SOB, cough. Has been ambulating with assistance. Oriented to person, place, and time. Breathing comfortably on room air.    REVIEW OF SYSTEMS:    Constitutional:     [ ] negative [ ] fevers [ ] chills [ ] weight loss [ ] weight gain  HEENT:                  [ ] negative [ ] dry eyes [ ] eye irritation [ ] postnasal drip [ ] nasal congestion  CV:                         [ ] negative  [ ] chest pain [ ] orthopnea [ ] palpitations [ ] murmur  Resp:                     [ ] negative [ ] cough [ ] shortness of breath [ ] dyspnea [ ] wheezing [ ] sputum [ ] hemoptysis  GI:                          [ ] negative [ ] nausea [ ] vomiting [ ] diarrhea [ ] constipation [ ] abd pain [ ] dysphagia   :                        [ ] negative [ ] dysuria [ ] nocturia [ ] hematuria [ ] increased urinary frequency  Musculoskeletal: [ ] negative [ ] back pain [ ] myalgias [ ] arthralgias [ ] fracture  Skin:                       [ ] negative [ ] rash [ ] itch  Neurological:        [ ] negative [ ] headache [ ] dizziness [ ] syncope [ ] weakness [ ] numbness  Psychiatric:           [ ] negative [ ] anxiety [ ] depression  Endocrine:            [ ] negative [ ] diabetes [ ] thyroid problem  Heme/Lymph:      [ ] negative [ ] anemia [ ] bleeding problem  Allergic/Immune: [ ] negative [ ] itchy eyes [ ] nasal discharge [ ] hives [ ] angioedema    [x ] All other systems negative  [ ] Unable to assess ROS because ________.    MEDICATIONS:  MEDICATIONS  (STANDING):  chlorhexidine 2% Cloths 1 Application(s) Topical <User Schedule>  digoxin  Injectable 250 MICROGram(s) IV Push every 6 hours    MEDICATIONS  (PRN):      ALLERGIES: Allergies    penicillin (Other)  shellfish (Angioedema)    Intolerances        OBJECTIVE:  ICU Vital Signs Last 24 Hrs  T(C): 36.7 (06 Jul 2023 04:00), Max: 36.8 (05 Jul 2023 16:21)  T(F): 98 (06 Jul 2023 04:00), Max: 98.2 (05 Jul 2023 16:21)  HR: 133 (06 Jul 2023 07:00) (120 - 171)  BP: 133/109 (06 Jul 2023 06:00) (89/57 - 163/139)  BP(mean): 114 (06 Jul 2023 06:00) (89 - 148)  RR: 26 (06 Jul 2023 07:00) (17 - 33)  SpO2: 96% (06 Jul 2023 06:00) (96% - 100%)    O2 Parameters below as of 06 Jul 2023 06:00  Patient On (Oxygen Delivery Method): room air      CAPILLARY BLOOD GLUCOSE        I&O's Summary    05 Jul 2023 07:01  -  06 Jul 2023 07:00  --------------------------------------------------------  IN: 0 mL / OUT: 4000 mL / NET: -4000 mL      Daily Height in cm: 170.18 (06 Jul 2023 00:13)        PHYSICAL EXAMINATION:  General: Comfortable, no acute distress, cooperative with exam.  HEENT: Moist mucous membranes.  Respiratory: CTAB, normal respiratory effort, no coughing, wheezes, crackles, or rales.  CV: RRR, S1S2, no murmurs, rubs or gallops. No JVD. Distal pulses intact.  Abdominal: Soft, nontender, nondistended, no rebound or guarding, normal bowel sounds.  Neurology: AOx3, no focal neuro defects, VERDIN x 4.  Extremities: No pitting edema, + Peripheral pulses.  Cath site:   Tubes:    LABS:                          13.7   5.34  )-----------( 253      ( 06 Jul 2023 02:10 )             39.7     07-06    139  |  105  |  34<H>  ----------------------------<  114<H>  4.1   |  16<L>  |  1.28    Ca    9.7      06 Jul 2023 02:10  Phos  4.0     07-06  Mg     2.50     07-06    TPro  6.5  /  Alb  4.0  /  TBili  2.0<H>  /  DBili  x   /  AST  129<H>  /  ALT  272<H>  /  AlkPhos  109  07-05    LIVER FUNCTIONS - ( 05 Jul 2023 17:40 )  Alb: 4.0 g/dL / Pro: 6.5 g/dL / ALK PHOS: 109 U/L / ALT: 272 U/L / AST: 129 U/L / GGT: x           PT/INR - ( 06 Jul 2023 02:10 )   PT: 20.9 sec;   INR: 1.79 ratio         PTT - ( 06 Jul 2023 02:10 )  PTT:29.6 sec        Urinalysis Basic - ( 06 Jul 2023 02:10 )    Color: x / Appearance: x / SG: x / pH: x  Gluc: 114 mg/dL / Ketone: x  / Bili: x / Urobili: x   Blood: x / Protein: x / Nitrite: x   Leuk Esterase: x / RBC: x / WBC x   Sq Epi: x / Non Sq Epi: x / Bacteria: x        TELEMETRY: Rapid AF     EKG:     IMAGING:     Transthoracic Echocardiogram (05.05.23 @ 07:41)   DIMENSIONS:  Dimensions:     Normal Values:  LA:     4.7 cm    2.0 - 4.0 cm  Ao:     3.2 cm    2.0 - 3.8 cm  SEPTUM: 0.8 cm    0.6 - 1.2 cm  PWT:    0.7 cm    0.6 - 1.1 cm  LVIDd:  4.7 cm    3.0 - 5.6 cm  LVIDs:  4.0 cm    1.8 - 4.0 cm      Derived Variables:  LVMI: 53 g/m2  RWT: 0.29  Ejection Fraction Visual Estimate: 40-45 %  Ejection Fraction Arreola: 41 %    ------------------------------------------------------------------------  OBSERVATIONS:  Mitral Valve: Normal mitral valve. Mild to moderate mitral  regurgitation.  Aortic Root: Aortic Root: 3.2 cm.    Aortic Valve: Normal trileaflet aortic valve.  Left Atrium: Severely dilated left atrium.  LA volume index  = 54 cc/m2.  Left Ventricle: Mild global left ventricular systolic  dysfunction. Paradoxical septal motion is seen, consistent  with conduction delay. Normal left ventricular internal  dimensions and wall thicknesses. Unable to adequately  assess diastolic function due to technical aspects of this  study.  Right Heart: Normal right atrium. Normal right ventricular  size and systolic function (TAPSE  1.7cm). There is trace  tricuspid regurgitation. There is trace pulmonic  regurgitation.  Pericardium/PleuraNormal pericardium with no pericardial  effusion.  Hemodynamic: Unable to estimate RVSP.  ------------------------------------------------------------------------  CONCLUSIONS:  1. Normal mitral valve. Mild to moderate mitral  regurgitation.  2. Normal trileaflet aortic valve.  3. Aortic Root: 3.2 cm.  4. Severely dilated left atrium.  LA volume index = 54  cc/m2.  5. Normal left ventricular internal dimensions and wall  thicknesses.  6. Mild global left ventricular systolic dysfunction.  Paradoxical septal motion is seen, consistent with  conduction delay.  7. Unable to adequately assess diastolic function due to  technical aspects of this study.  8. Normal right atrium.  9. Normal right ventricular size and systolic function  (TAPSE  1.7cm).  10. Unable to estimate RVSP.  11. There is trace tricuspid regurgitation.  12. There is trace pulmonic regurgitation.  13. Normal pericardium with no pericardial effusion.      Cardiac Catheterization (05.08.23 @ 15:18)  Coronary Angiography   The coronary circulation is right dominant.      LM   Left main artery: Angiography shows mild atherosclerosis.      LAD   Left anterior descending artery: There is a 20 % stenosis.      CX   Circumflex: Angiography shows mild atherosclerosis.      RCA   Right coronary artery: There is a 20 % stenosis.

## 2023-07-06 NOTE — PATIENT PROFILE ADULT - FUNCTIONAL ASSESSMENT - BASIC MOBILITY 6.
3-calculated by average/Not able to assess (calculate score using [facility] averaging method)

## 2023-07-06 NOTE — PATIENT PROFILE ADULT - FALL HARM RISK - HARM RISK INTERVENTIONS

## 2023-07-06 NOTE — PROGRESS NOTE ADULT - ASSESSMENT
This is a 48 year old woman with a pmhx of HTN, CHF, Afib on Eliquis and metoprolol who presents to the ED for dizziness who was found to be in AFIB with RVR and acute decompensated heart failure s/p digoxin load and now starting amiodarone with plans to TROY/DCCV    Plan:  - Continuous telemetric monitoring  - Monitor electrolytes and replete K to 4 and Mg to 2  - Continue Eliquis for thromboembolic ppx  given that patient has a [id number] score of 3  - Start amiodarone load (400mg orally TID x 12 doses), then 200mg orally once daily. The side effect of amiodarone was discussed with patient in detail which includes but not limited to ventricular arrhythmia (VT/VF) due to QTC prologation, pulmonary toxicity, hypo/hyperthyroidism, hepatotoxicity, pancreatitis, optic neuritis, and rhambomylosis.  --- baseline, then routine evaluation of LFTs and TFTs; PFTs if able and routine ophthalmological examination (including fundoscopy)  - Diuresis per CCU  - Continue care per CCU    Patient to be staff with attending. Please await attending addendum  This is a 48 year old woman with a pmhx of HTN, CHF, Afib on Eliquis and metoprolol who presents to the ED for dizziness who was found to be in AFIB with RVR and acute decompensated heart failure s/p digoxin load and now starting amiodarone with plans to TROY/DCCV on 7/7/2023. The risks and benefits was explained in detail which included but not limited to bleeding requiring transfusion, infection, stroke, plural effusion, esophageal injury, pericardial effusion, and intubation. Patient expressed understanding and all questions were answered.     Plan:  - Continuous telemetric monitoring  - Monitor electrolytes and replete K to 4 and Mg to 2  - Continue Eliquis for thromboembolic ppx  given that patient has a [id number] score of 3  - Start amiodarone load (400mg orally TID x 12 doses), then 200mg orally once daily. The side effect of amiodarone was discussed with patient in detail which includes but not limited to ventricular arrhythmia (VT/VF) due to QTC prologation, pulmonary toxicity, hypo/hyperthyroidism, hepatotoxicity, pancreatitis, optic neuritis, and rhambomylosis.  --- baseline, then routine evaluation of LFTs and TFTs; PFTs if able and routine ophthalmological examination (including fundoscopy)  - Diuresis per CCU  - Plan for TROY/DCCV on 7/7/2023. Patient is consented for DCCV.   - NPO after MN  - Continue care per CCU    Nelsy Lizarraga PA-C  Patient to be staff with attending. Please await attending addendum  This is a 48 year old woman with a pmhx of HTN, CHF, Afib on Eliquis and metoprolol who presents to the ED for dizziness who was found to be in AFIB with RVR and acute decompensated heart failure s/p digoxin load and now starting amiodarone with plans to TROY/DCCV on 7/7/2023. The risks and benefits was explained in detail which included but not limited to bleeding requiring transfusion, infection, stroke, plural effusion, esophageal injury, pericardial effusion, and intubation. Patient expressed understanding and all questions were answered.     Plan:  - Continuous telemetric monitoring  - Monitor electrolytes and replete K to 4 and Mg to 2  - Continue Eliquis for thromboembolic ppx  given that patient has a [id number] score of 3  - Start amiodarone load (400mg orally TID x 12 doses), then 200mg orally once daily. The side effect of amiodarone was discussed with patient in detail which includes but not limited to ventricular arrhythmia (VT/VF) due to QTC prologation, pulmonary toxicity, hypo/hyperthyroidism, hepatotoxicity, pancreatitis, optic neuritis, and rhambomylosis.  --- baseline, then routine evaluation of LFTs and TFTs; PFTs if able and routine ophthalmological examination (including fundoscopy)  - Consult HF  - Diuresis per CCU  - Plan for TROY/DCCV on 7/7/2023. Patient is consented for DCCV.   - NPO after MN  - Continue care per CCU    Nelsy Lizarraga PA-C  Patient to be staff with attending. Please await attending addendum  This is a 48 year old woman with a pmhx of HTN, CHF, Afib on Eliquis and metoprolol who presents to the ED for dizziness who was found to be in AFIB with RVR and acute decompensated heart failure s/p digoxin load and now starting amiodarone with plans to TROY/DCCV on 7/7/2023. The risks and benefits was explained in detail which included but not limited to bleeding requiring transfusion, infection, stroke, plural effusion, esophageal injury, pericardial effusion, and intubation. Patient expressed understanding and all questions were answered.     Plan:  - Continuous telemetric monitoring  - Monitor electrolytes and replete K to 4 and Mg to 2  - Continue Eliquis for thromboembolic ppx  given that patient has a [id number] score of 3  - Start amiodarone load (400mg orally TID x 12 doses), then 200mg orally once daily. The side effect of amiodarone was discussed with patient in detail which includes but not limited to ventricular arrhythmia (VT/VF) due to QTC prolongation pulmonary toxicity, hypo/hyperthyroidism, hepatotoxicity, pancreatitis, optic neuritis, and rhabdomyolysis  --- baseline, then routine evaluation of LFTs and TFTs; PFTs if able and routine ophthalmological examination (including fundoscopy)  - Consult HF  - Diuresis per CCU  - Plan for TROY/DCCV on 7/7/2023. Patient is consented for DCCV.   - NPO after MN and obtain urine pregnancy test  - Continue care per CCU    Nelsy Lizarraga PA-C  Patient to be staff with attending. Please await attending addendum

## 2023-07-07 LAB
ALBUMIN SERPL ELPH-MCNC: 3.4 G/DL — SIGNIFICANT CHANGE UP (ref 3.3–5)
ALP SERPL-CCNC: 113 U/L — SIGNIFICANT CHANGE UP (ref 40–120)
ALT FLD-CCNC: 201 U/L — HIGH (ref 4–33)
ANION GAP SERPL CALC-SCNC: 15 MMOL/L — HIGH (ref 7–14)
APTT BLD: 31.8 SEC — SIGNIFICANT CHANGE UP (ref 27–36.3)
AST SERPL-CCNC: 80 U/L — HIGH (ref 4–32)
BASE EXCESS BLDV CALC-SCNC: 5.2 MMOL/L — HIGH (ref -2–3)
BILIRUB DIRECT SERPL-MCNC: 0.5 MG/DL — HIGH (ref 0–0.3)
BILIRUB INDIRECT FLD-MCNC: 1 MG/DL — SIGNIFICANT CHANGE UP (ref 0–1)
BILIRUB SERPL-MCNC: 1.5 MG/DL — HIGH (ref 0.2–1.2)
BLOOD GAS VENOUS COMPREHENSIVE RESULT: SIGNIFICANT CHANGE UP
BUN SERPL-MCNC: 36 MG/DL — HIGH (ref 7–23)
CALCIUM SERPL-MCNC: 9.1 MG/DL — SIGNIFICANT CHANGE UP (ref 8.4–10.5)
CHLORIDE BLDV-SCNC: 105 MMOL/L — SIGNIFICANT CHANGE UP (ref 96–108)
CHLORIDE SERPL-SCNC: 104 MMOL/L — SIGNIFICANT CHANGE UP (ref 98–107)
CO2 BLDV-SCNC: 32.6 MMOL/L — HIGH (ref 22–26)
CO2 SERPL-SCNC: 23 MMOL/L — SIGNIFICANT CHANGE UP (ref 22–31)
CREAT SERPL-MCNC: 1.42 MG/DL — HIGH (ref 0.5–1.3)
EGFR: 46 ML/MIN/1.73M2 — LOW
GAS PNL BLDV: 136 MMOL/L — SIGNIFICANT CHANGE UP (ref 136–145)
GAS PNL BLDV: SIGNIFICANT CHANGE UP
GLUCOSE BLDV-MCNC: 110 MG/DL — HIGH (ref 70–99)
GLUCOSE SERPL-MCNC: 102 MG/DL — HIGH (ref 70–99)
HCG SERPL-ACNC: <1 MIU/ML — SIGNIFICANT CHANGE UP
HCO3 BLDV-SCNC: 31 MMOL/L — HIGH (ref 22–29)
HCT VFR BLD CALC: 43.7 % — SIGNIFICANT CHANGE UP (ref 34.5–45)
HCT VFR BLDA CALC: 44 % — SIGNIFICANT CHANGE UP (ref 34.5–46.5)
HGB BLD CALC-MCNC: 14.5 G/DL — SIGNIFICANT CHANGE UP (ref 11.7–16.1)
HGB BLD-MCNC: 14.2 G/DL — SIGNIFICANT CHANGE UP (ref 11.5–15.5)
INR BLD: 1.8 RATIO — HIGH (ref 0.88–1.16)
LACTATE BLDV-MCNC: 1.4 MMOL/L — SIGNIFICANT CHANGE UP (ref 0.5–2)
MAGNESIUM SERPL-MCNC: 1.7 MG/DL — SIGNIFICANT CHANGE UP (ref 1.6–2.6)
MCHC RBC-ENTMCNC: 32.5 GM/DL — SIGNIFICANT CHANGE UP (ref 32–36)
MCHC RBC-ENTMCNC: 32.8 PG — SIGNIFICANT CHANGE UP (ref 27–34)
MCV RBC AUTO: 100.9 FL — HIGH (ref 80–100)
NRBC # BLD: 0 /100 WBCS — SIGNIFICANT CHANGE UP (ref 0–0)
NRBC # FLD: 0.03 K/UL — HIGH (ref 0–0)
NT-PROBNP SERPL-SCNC: 3511 PG/ML — HIGH
PCO2 BLDV: 49 MMHG — SIGNIFICANT CHANGE UP (ref 39–52)
PH BLDV: 7.41 — SIGNIFICANT CHANGE UP (ref 7.32–7.43)
PHOSPHATE SERPL-MCNC: 4.3 MG/DL — SIGNIFICANT CHANGE UP (ref 2.5–4.5)
PLATELET # BLD AUTO: 251 K/UL — SIGNIFICANT CHANGE UP (ref 150–400)
PO2 BLDV: 29 MMHG — SIGNIFICANT CHANGE UP (ref 25–45)
POTASSIUM BLDV-SCNC: 4.4 MMOL/L — SIGNIFICANT CHANGE UP (ref 3.5–5.1)
POTASSIUM SERPL-MCNC: 4.1 MMOL/L — SIGNIFICANT CHANGE UP (ref 3.5–5.3)
POTASSIUM SERPL-SCNC: 4.1 MMOL/L — SIGNIFICANT CHANGE UP (ref 3.5–5.3)
PROT SERPL-MCNC: 6.4 G/DL — SIGNIFICANT CHANGE UP (ref 6–8.3)
PROTHROM AB SERPL-ACNC: 21 SEC — HIGH (ref 10.5–13.4)
RBC # BLD: 4.33 M/UL — SIGNIFICANT CHANGE UP (ref 3.8–5.2)
RBC # FLD: 16.9 % — HIGH (ref 10.3–14.5)
SAO2 % BLDV: 37.9 % — LOW (ref 67–88)
SODIUM SERPL-SCNC: 142 MMOL/L — SIGNIFICANT CHANGE UP (ref 135–145)
WBC # BLD: 5.28 K/UL — SIGNIFICANT CHANGE UP (ref 3.8–10.5)
WBC # FLD AUTO: 5.28 K/UL — SIGNIFICANT CHANGE UP (ref 3.8–10.5)

## 2023-07-07 PROCEDURE — 93320 DOPPLER ECHO COMPLETE: CPT | Mod: 26,GC

## 2023-07-07 PROCEDURE — 99232 SBSQ HOSP IP/OBS MODERATE 35: CPT

## 2023-07-07 PROCEDURE — 93325 DOPPLER ECHO COLOR FLOW MAPG: CPT | Mod: 26,GC

## 2023-07-07 PROCEDURE — 93312 ECHO TRANSESOPHAGEAL: CPT | Mod: 26

## 2023-07-07 PROCEDURE — 99233 SBSQ HOSP IP/OBS HIGH 50: CPT

## 2023-07-07 PROCEDURE — 71045 X-RAY EXAM CHEST 1 VIEW: CPT | Mod: 26

## 2023-07-07 RX ORDER — MAGNESIUM SULFATE 500 MG/ML
2 VIAL (ML) INJECTION ONCE
Refills: 0 | Status: COMPLETED | OUTPATIENT
Start: 2023-07-07 | End: 2023-07-07

## 2023-07-07 RX ORDER — HYDRALAZINE HCL 50 MG
25 TABLET ORAL THREE TIMES A DAY
Refills: 0 | Status: DISCONTINUED | OUTPATIENT
Start: 2023-07-07 | End: 2023-07-08

## 2023-07-07 RX ORDER — DIGOXIN 250 MCG
250 TABLET ORAL EVERY 6 HOURS
Refills: 0 | Status: COMPLETED | OUTPATIENT
Start: 2023-07-07 | End: 2023-07-08

## 2023-07-07 RX ORDER — DIGOXIN 250 MCG
500 TABLET ORAL ONCE
Refills: 0 | Status: COMPLETED | OUTPATIENT
Start: 2023-07-07 | End: 2023-07-07

## 2023-07-07 RX ORDER — FUROSEMIDE 40 MG
20 TABLET ORAL ONCE
Refills: 0 | Status: COMPLETED | OUTPATIENT
Start: 2023-07-07 | End: 2023-07-07

## 2023-07-07 RX ADMIN — APIXABAN 5 MILLIGRAM(S): 2.5 TABLET, FILM COATED ORAL at 18:06

## 2023-07-07 RX ADMIN — APIXABAN 5 MILLIGRAM(S): 2.5 TABLET, FILM COATED ORAL at 06:50

## 2023-07-07 RX ADMIN — Medication 25 GRAM(S): at 06:22

## 2023-07-07 RX ADMIN — Medication 25 MILLIGRAM(S): at 16:06

## 2023-07-07 RX ADMIN — Medication 250 MICROGRAM(S): at 19:45

## 2023-07-07 RX ADMIN — Medication 25 MILLIGRAM(S): at 22:01

## 2023-07-07 RX ADMIN — SACUBITRIL AND VALSARTAN 1 TABLET(S): 24; 26 TABLET, FILM COATED ORAL at 18:09

## 2023-07-07 RX ADMIN — SACUBITRIL AND VALSARTAN 1 TABLET(S): 24; 26 TABLET, FILM COATED ORAL at 06:22

## 2023-07-07 RX ADMIN — Medication 20 MILLIGRAM(S): at 06:23

## 2023-07-07 RX ADMIN — Medication 500 MICROGRAM(S): at 13:18

## 2023-07-07 RX ADMIN — AMIODARONE HYDROCHLORIDE 400 MILLIGRAM(S): 400 TABLET ORAL at 06:23

## 2023-07-07 RX ADMIN — Medication 20 MILLIGRAM(S): at 13:16

## 2023-07-07 RX ADMIN — CHLORHEXIDINE GLUCONATE 1 APPLICATION(S): 213 SOLUTION TOPICAL at 06:23

## 2023-07-07 NOTE — PROGRESS NOTE ADULT - ASSESSMENT
This is a 48 year old woman with a pmhx of HTN, CHF, Afib on Eliquis and metoprolol who presents to the ED for dizziness who was found to be in AFIB with RVR and acute decompensated heart failure s/p digoxin load and now starting amiodarone with plans to TROY/DCCV on 7/7/2023. The risks and benefits was explained in detail which included but not limited to bleeding requiring transfusion, infection, stroke, plural effusion, esophageal injury, pericardial effusion, and intubation. Patient expressed understanding and all questions were answered.     Plan:  - Continuous telemetric monitoring  - Monitor electrolytes and replete K to 4 and Mg to 2  - Continue Eliquis for thromboembolic ppx  given that patient has a [id number] score of 3  - Start amiodarone load (400mg orally TID x 12 doses), then 200mg orally once daily. The side effect of amiodarone was discussed with patient in detail which includes but not limited to ventricular arrhythmia (VT/VF) due to QTC prolongation pulmonary toxicity, hypo/hyperthyroidism, hepatotoxicity, pancreatitis, optic neuritis, and rhabdomyolysis  --- baseline, then routine evaluation of LFTs and TFTs; PFTs if able and routine ophthalmological examination (including fundoscopy)  - Consult HF  - Diuresis per CCU  - Plan for TROY/DCCV on 7/7/2023. Patient is consented for DCCV.   - NPO after MN and obtain urine pregnancy test  - Continue care per CCU

## 2023-07-07 NOTE — PROGRESS NOTE ADULT - ASSESSMENT
48y female with PHMx: of HTN, HF (EF 41%), Afib on Eliquis and metoprolol who presents to the ED for dizziness who was found to be in AFIB with RVR and ADHF 2/2 med noncompliance. Admitted to CCU for close HD monitoring.     Neuro:  no issues   - A/Ox3  - No issues     Resp:  - Not tachypneic   - On RA  - Patient was given Lasix     Cardiac:   A fib with RVR  - In setting of med noncompliance   - ECG with afib with RVR without ischemic changes. trops neg.  - HR 120s-140s with BP now 110s-130s but with warm extremities   - S/p lopressor x1 without effect  - Discontinued Digoxin, now on amiodarone load  - Previous TSH wnl   - TROY/DCCV today  - Zolls at bedside  - CHADS-VAS 3  - Continue Apixaban   - MAP goal>65    Acute on chronic systolic heart failure   - Euvolemic on exam    - Likely tachy induced   - NICM  - TTE: Severe segmental left ventricular systolicdysfunction.The septal, apical,  inferior, anterior, and anteroseptal are akinetic. The remaining walls are hypokinetic. LVEF calculated using biplane Arreola's method is 27%.  Sev MR  - Cath 5/23 non obstructive  - Continue Entresto    - Continue Lasix 20mg daily   - s/p bumex in ED. Cont with diuresis as needed  - Lact 3.7-->1.4, elevated LFTs     GI:  - NPO after MN  - Continue diet   - Transaminitis in setting of poor perfusion  - Cont to trend LFTS with perfusion markers     Renal:  GREY   - likely in setting of volume overload. clinically with LE edema   - s/p 1 L in ED and then diuresed. diuresis as above   - Cont to trend crn with diuresis   - replete lytes as appropriate     ID:  - afebrile, wbc nl   - Cont to monitor off abx     Heme:  - Cont home Eliquis for A/C for afib   - cbc stable    Endo:  - Previous TSH wnl

## 2023-07-07 NOTE — PROGRESS NOTE ADULT - SUBJECTIVE AND OBJECTIVE BOX
Sarath Salazar NP  CCU Service  Cardiology   Spect: [id number] ([facility])     PATIENT: SANTY BREAUX, MRN: [id number]    CHIEF COMPLAINT: Patient is a 48y old  Female who presents with a chief complaint of afib with rvr (2023 11:09)      INTERVAL HISTORY/OVERNIGHT EVENTS: Remains in rapid A fib. Denies abdominal pain, chest pain or SOB, cough. Oriented to person, place, and time. Breathing comfortably on room air.    REVIEW OF SYSTEMS:    Constitutional:     [ ] negative [ ] fevers [ ] chills [ ] weight loss [ ] weight gain  HEENT:                  [ ] negative [ ] dry eyes [ ] eye irritation [ ] postnasal drip [ ] nasal congestion  CV:                         [ ] negative  [ ] chest pain [ ] orthopnea [ ] palpitations [ ] murmur  Resp:                     [ ] negative [ ] cough [ ] shortness of breath [ ] dyspnea [ ] wheezing [ ] sputum [ ] hemoptysis  GI:                          [ ] negative [ ] nausea [ ] vomiting [ ] diarrhea [ ] constipation [ ] abd pain [ ] dysphagia   :                        [ ] negative [ ] dysuria [ ] nocturia [ ] hematuria [ ] increased urinary frequency  Musculoskeletal: [ ] negative [ ] back pain [ ] myalgias [ ] arthralgias [ ] fracture  Skin:                       [ ] negative [ ] rash [ ] itch  Neurological:        [ ] negative [ ] headache [ ] dizziness [ ] syncope [ ] weakness [ ] numbness  Psychiatric:           [ ] negative [ ] anxiety [ ] depression  Endocrine:            [ ] negative [ ] diabetes [ ] thyroid problem  Heme/Lymph:      [ ] negative [ ] anemia [ ] bleeding problem  Allergic/Immune: [ ] negative [ ] itchy eyes [ ] nasal discharge [ ] hives [ ] angioedema    [x ] All other systems negative  [ ] Unable to assess ROS because ________.    MEDICATIONS:  MEDICATIONS  (STANDING):  aMIOdarone    Tablet   Oral   aMIOdarone    Tablet 400 milliGRAM(s) Oral every 8 hours  apixaban 5 milliGRAM(s) Oral every 12 hours  chlorhexidine 2% Cloths 1 Application(s) Topical <User Schedule>  furosemide    Tablet 20 milliGRAM(s) Oral daily  sacubitril 24 mG/valsartan 26 mG 1 Tablet(s) Oral two times a day    MEDICATIONS  (PRN):  benzocaine/menthol Lozenge 1 Lozenge Oral four times a day PRN Sore Throat      ALLERGIES: Allergies    penicillin (Other)  shellfish (Angioedema)    Intolerances        OBJECTIVE:  ICU Vital Signs Last 24 Hrs  T(C): 36.6 (2023 08:00), Max: 37.2 (2023 08:00)  T(F): 97.9 (2023 08:00), Max: 98.9 (2023 08:00)  HR: 139 (2023 07:00) (99 - 174)  BP: 140/105 (2023 07:00) (109/92 - 146/112)  BP(mean): 114 (2023 07:00) (98 - 131)  RR: 27 (2023 07:00) (16 - 30)  SpO2: 97% (2023 07:00) (82% - 100%)    O2 Parameters below as of 2023 06:00  Patient On (Oxygen Delivery Method): nasal cannula  O2 Flow (L/min): 2    CAPILLARY BLOOD GLUCOSE        I&O's Summary    2023 07:01  -  2023 07:00  --------------------------------------------------------  IN: 930 mL / OUT: 3300 mL / NET: -2370 mL      Daily Weight in k.4 (2023 00:00)    PHYSICAL EXAMINATION:  General: Comfortable, no acute distress, cooperative with exam.  HEENT: Moist mucous membranes.  Respiratory: CTAB, normal respiratory effort, no coughing, wheezes, crackles, or rales.  CV: IRREG, S1S2, + murmurs, rubs or gallops. No JVD. Distal pulses intact.  Abdominal: Soft, nontender, nondistended, no rebound or guarding, normal bowel sounds.  Neurology: AOx3, no focal neuro defects, VERDIN x 4.  Extremities: No pitting edema, + Peripheral pulses. Warm   Cath site:   Tubes:    LABS:                          14.2   5.28  )-----------( 251      ( 2023 04:24 )             43.7     07-    142  |  104  |  36<H>  ----------------------------<  102<H>  4.1   |  23  |  1.42<H>    Ca    9.1      2023 04:24  Phos  4.3       Mg     1.70         TPro  7.3  /  Alb  4.2  /  TBili  2.0<H>  /  DBili  0.8<H>  /  AST  130<H>  /  ALT  271<H>  /  AlkPhos  114  0706    LIVER FUNCTIONS - ( 2023 02:10 )  Alb: 4.2 g/dL / Pro: 7.3 g/dL / ALK PHOS: 114 U/L / ALT: 271 U/L / AST: 130 U/L / GGT: x           PT/INR - ( 2023 04:24 )   PT: 21.0 sec;   INR: 1.80 ratio         PTT - ( 2023 04:24 )  PTT:31.8 sec        Urinalysis Basic - ( 2023 04:24 )    Color: x / Appearance: x / SG: x / pH: x  Gluc: 102 mg/dL / Ketone: x  / Bili: x / Urobili: x   Blood: x / Protein: x / Nitrite: x   Leuk Esterase: x / RBC: x / WBC x   Sq Epi: x / Non Sq Epi: x / Bacteria: x        TELEMETRY: A fib, -140     EKG:     IMAGING:     Transthoracic Echocardiogram (23 @ 08:24)  DIMENSIONS:  Dimensions:     Normal Values:  LA:     5.2 cm    2.0 - 4.0 cm  Ao:     3.0 cm    2.0 - 3.8 cm  SEPTUM: 0.8 cm    0.6 - 1.2 cm  PWT:    1.0 cm    0.6 - 1.1 cm  LVIDd:  5.4 cm    3.0 - 5.6 cm  LVIDs:  5.0 cm    1.8 - 4.0 cm  Derived Variables:  LVMI: 87 g/m2  RWT: 0.37  Fractional short: 7 %  Ejection Fraction (Modified Arreola Rule): 27 %  ------------------------------------------------------------------------  OBSERVATIONS:  Mitral Valve: Mitral annular calcification, otherwise  normal mitral valve. Severe mitral regurgitation. The MR  vena contracta is 0.71 cm. There was no quantitation of MR.    Aortic Root: Aortic Root: 3.0 cm. Sinotubular Junction: 2.7  cm. Ascending Aorta: 3.1 cm.  Aortic Valve: Calcified trileaflet aortic valve with normal  opening. Minimal aortic regurgitation.  Left Atrium: Severely dilated left atrium.  LA volume index  = 56 cc/m2.  Left Ventricle: Severe segmental left ventricular systolic  dysfunction.The septal, apical,  inferior, anterior, and  anteroseptal are akinetic. The remaining walls are  hypokinetic. LVEF calculated using biplane Arreola's method  is 27%. Unable to determine the presence of clot.  Normal  wall thickness and dilated left ventricular cavity.  Severe  reversible diastolic dysfunction (Stage III).  Right Heart: Normal right atrium. Normal right ventricular  size and function. Normal tricuspid valve. Moderate  tricuspid regurgitation. Normal pulmonic valve. Minimal  pulmonic regurgitation.  Pericardium/PleuraNo pericardial effusion seen.  Hemodynamic: Estimated right atrial pressure is 15 mm Hg.  Estimated right ventricular systolicpressure equals 59 mm  Hg, assuming right atrial pressure equals 15 mm Hg,  consistent with moderate pulmonary hypertension.  ------------------------------------------------------------------------  CONCLUSIONS:  1. Mitral annular calcification, otherwise normal mitral  valve. Severe mitral regurgitation. The MR vena contracta  is 0.71 cm. There was no quantitation of MR.  2. Calcified trileaflet aortic valve with normal opening.  Minimal aortic regurgitation.  3. Severely dilated left atrium.  LA volume index = 56  cc/m2. Normal wall thickness and dilated left ventricular  cavity. Severe segmental left ventricular systolic  dysfunction.The septal, apical,  inferior, anterior, and  anteroseptal are akinetic. The remaining walls are  hypokinetic. LVEF calculated using biplane Arreola's method  is 27%. Unable to determine the presence of clot. Severe  reversible diastolic dysfunction (Stage III).  4. Normal right atrium.Normal right ventricular size and  function.  5. Normal tricuspid valve. Moderate tricuspid  regurgitation.Estimated pulmonary artery systolic pressure  equals 59 mm Hg, assuming right atrial pressure equals 15  mm Hg, consistent with moderate pulmonary hypertension.

## 2023-07-07 NOTE — PROGRESS NOTE ADULT - SUBJECTIVE AND OBJECTIVE BOX
Interval History:  No acute events overnight  Telemetry: Afib 90-130s    MEDICATIONS  (STANDING):  aMIOdarone    Tablet   Oral   aMIOdarone    Tablet 400 milliGRAM(s) Oral every 8 hours  apixaban 5 milliGRAM(s) Oral every 12 hours  chlorhexidine 2% Cloths 1 Application(s) Topical <User Schedule>  furosemide    Tablet 20 milliGRAM(s) Oral daily  sacubitril 24 mG/valsartan 26 mG 1 Tablet(s) Oral two times a day    MEDICATIONS  (PRN):  benzocaine/menthol Lozenge 1 Lozenge Oral four times a day PRN Sore Throat    Vital Signs Last 24 Hrs  T(C): 36.6 (07-07-23 @ 08:00), Max: 36.6 (07-06-23 @ 16:00)  T(F): 97.9 (07-07-23 @ 08:00), Max: 97.9 (07-06-23 @ 16:00)  HR: 127 (07-07-23 @ 09:00) (99 - 144)  BP: 144/118 (07-07-23 @ 09:00) (109/92 - 146/112)  BP(mean): 127 (07-07-23 @ 09:00) (98 - 131)  RR: 15 (07-07-23 @ 09:00) (15 - 30)  SpO2: 98% (07-07-23 @ 09:00) (82% - 100%)    Appearance: Normal	  HEENT:   Normal oral mucosa, PERRL, EOMI	  Lymphatic: No lymphadenopathy  Cardiovascular: No JVD, No murmurs, No edema  Respiratory: Lungs clear to auscultation	  Psychiatry: A & O x 3, Mood & affect appropriate  Gastrointestinal:  Soft, Non-tender, + BS	  Skin: No rashes, No ecchymoses, No cyanosis	  Neurologic: Non-focal  Extremities: Normal range of motion, No clubbing, cyanosis or edema  Vascular: Peripheral pulses palpable 2+ bilaterally    LABS:	 	    CBC Full  -  ( 07 Jul 2023 04:24 )  WBC Count : 5.28 K/uL  Hemoglobin : 14.2 g/dL  Hematocrit : 43.7 %  Platelet Count - Automated : 251 K/uL  Mean Cell Volume : 100.9 fL  Mean Cell Hemoglobin : 32.8 pg  Mean Cell Hemoglobin Concentration : 32.5 gm/dL  Auto Neutrophil # : x  Auto Lymphocyte # : x  Auto Monocyte # : x  Auto Eosinophil # : x  Auto Basophil # : x  Auto Neutrophil % : x  Auto Lymphocyte % : x  Auto Monocyte % : x  Auto Eosinophil % : x  Auto Basophil % : x    07-07    142  |  104  |  36<H>  ----------------------------<  102<H>  4.1   |  23  |  1.42<H>  07-06    139  |  105  |  34<H>  ----------------------------<  114<H>  4.1   |  16<L>  |  1.28    Ca    9.1      07 Jul 2023 04:24  Ca    9.7      06 Jul 2023 02:10  Phos  4.3     07-07  Phos  4.0     07-06  Mg     1.70     07-07  Mg     2.50     07-06    TPro  6.4  /  Alb  3.4  /  TBili  1.5<H>  /  DBili  0.5<H>  /  AST  80<H>  /  ALT  201<H>  /  AlkPhos  113  07-07  TPro  7.3  /  Alb  4.2  /  TBili  2.0<H>  /  DBili  0.8<H>  /  AST  130<H>  /  ALT  271<H>  /  AlkPhos  114  07-06    PT/INR - ( 07 Jul 2023 04:24 )   PT: 21.0 sec;   INR: 1.80 ratio      PTT - ( 07 Jul 2023 04:24 )  PTT:31.8 sec    LIVER FUNCTIONS - ( 07 Jul 2023 04:24 )  Alb: 3.4 g/dL / Pro: 6.4 g/dL / ALK PHOS: 113 U/L / ALT: 201 U/L / AST: 80 U/L / GGT: x

## 2023-07-07 NOTE — PROGRESS NOTE ADULT - ATTENDING COMMENTS
1. AF w/ RVR  2. Cardiomyopathy LVEF 27% - likely arrhythmia induced non-obx CAD recent LHC  3. Severe MR  4. GREY  5. HTN    NPO for TROY/cardioversion today.
Agree with fellow's attestation  Patient initially refused cardioversion, rate not adequately controlled on digoxin  Start amiodarone as per EP, resume Eliquis  Keep NPO for possible TROY/cardioversion 7/7  Recommend continued close monitoring in CICU setting

## 2023-07-08 LAB
ALT FLD-CCNC: 190 U/L — HIGH (ref 4–33)
ANION GAP SERPL CALC-SCNC: 14 MMOL/L — SIGNIFICANT CHANGE UP (ref 7–14)
APTT BLD: 31.3 SEC — SIGNIFICANT CHANGE UP (ref 27–36.3)
AST SERPL-CCNC: 104 U/L — HIGH (ref 4–32)
BASE EXCESS BLDV CALC-SCNC: 3.7 MMOL/L — HIGH (ref -2–3)
BLOOD GAS VENOUS COMPREHENSIVE RESULT: SIGNIFICANT CHANGE UP
BUN SERPL-MCNC: 38 MG/DL — HIGH (ref 7–23)
CALCIUM SERPL-MCNC: 9.2 MG/DL — SIGNIFICANT CHANGE UP (ref 8.4–10.5)
CHLORIDE BLDV-SCNC: 102 MMOL/L — SIGNIFICANT CHANGE UP (ref 96–108)
CHLORIDE SERPL-SCNC: 102 MMOL/L — SIGNIFICANT CHANGE UP (ref 98–107)
CO2 BLDV-SCNC: 28.9 MMOL/L — HIGH (ref 22–26)
CO2 SERPL-SCNC: 22 MMOL/L — SIGNIFICANT CHANGE UP (ref 22–31)
CREAT SERPL-MCNC: 1.32 MG/DL — HIGH (ref 0.5–1.3)
EGFR: 50 ML/MIN/1.73M2 — LOW
GAS PNL BLDV: 132 MMOL/L — LOW (ref 136–145)
GLUCOSE BLDV-MCNC: 142 MG/DL — HIGH (ref 70–99)
GLUCOSE SERPL-MCNC: 144 MG/DL — HIGH (ref 70–99)
HCO3 BLDV-SCNC: 28 MMOL/L — SIGNIFICANT CHANGE UP (ref 22–29)
HCT VFR BLD CALC: 48 % — HIGH (ref 34.5–45)
HCT VFR BLDA CALC: 49 % — HIGH (ref 34.5–46.5)
HGB BLD CALC-MCNC: 16.3 G/DL — HIGH (ref 11.7–16.1)
HGB BLD-MCNC: 15.7 G/DL — HIGH (ref 11.5–15.5)
INR BLD: 1.44 RATIO — HIGH (ref 0.88–1.16)
LACTATE BLDV-MCNC: 2.2 MMOL/L — HIGH (ref 0.5–2)
LACTATE SERPL-SCNC: 1.8 MMOL/L — SIGNIFICANT CHANGE UP (ref 0.5–2)
MAGNESIUM SERPL-MCNC: 1.9 MG/DL — SIGNIFICANT CHANGE UP (ref 1.6–2.6)
MCHC RBC-ENTMCNC: 32.7 GM/DL — SIGNIFICANT CHANGE UP (ref 32–36)
MCHC RBC-ENTMCNC: 33.1 PG — SIGNIFICANT CHANGE UP (ref 27–34)
MCV RBC AUTO: 101.3 FL — HIGH (ref 80–100)
NRBC # BLD: 0 /100 WBCS — SIGNIFICANT CHANGE UP (ref 0–0)
NRBC # FLD: 0.03 K/UL — HIGH (ref 0–0)
NT-PROBNP SERPL-SCNC: 1528 PG/ML — HIGH
PCO2 BLDV: 39 MMHG — SIGNIFICANT CHANGE UP (ref 39–52)
PH BLDV: 7.46 — HIGH (ref 7.32–7.43)
PHOSPHATE SERPL-MCNC: 3.6 MG/DL — SIGNIFICANT CHANGE UP (ref 2.5–4.5)
PLATELET # BLD AUTO: 269 K/UL — SIGNIFICANT CHANGE UP (ref 150–400)
PO2 BLDV: 55 MMHG — HIGH (ref 25–45)
POTASSIUM BLDV-SCNC: 3.6 MMOL/L — SIGNIFICANT CHANGE UP (ref 3.5–5.1)
POTASSIUM SERPL-MCNC: 3.8 MMOL/L — SIGNIFICANT CHANGE UP (ref 3.5–5.3)
POTASSIUM SERPL-SCNC: 3.8 MMOL/L — SIGNIFICANT CHANGE UP (ref 3.5–5.3)
PROTHROM AB SERPL-ACNC: 16.8 SEC — HIGH (ref 10.5–13.4)
RBC # BLD: 4.74 M/UL — SIGNIFICANT CHANGE UP (ref 3.8–5.2)
RBC # FLD: 16.8 % — HIGH (ref 10.3–14.5)
SAO2 % BLDV: 86.5 % — SIGNIFICANT CHANGE UP (ref 67–88)
SODIUM SERPL-SCNC: 138 MMOL/L — SIGNIFICANT CHANGE UP (ref 135–145)
WBC # BLD: 5.76 K/UL — SIGNIFICANT CHANGE UP (ref 3.8–10.5)
WBC # FLD AUTO: 5.76 K/UL — SIGNIFICANT CHANGE UP (ref 3.8–10.5)

## 2023-07-08 PROCEDURE — 99232 SBSQ HOSP IP/OBS MODERATE 35: CPT

## 2023-07-08 RX ORDER — CARVEDILOL PHOSPHATE 80 MG/1
3.12 CAPSULE, EXTENDED RELEASE ORAL EVERY 12 HOURS
Refills: 0 | Status: DISCONTINUED | OUTPATIENT
Start: 2023-07-08 | End: 2023-07-10

## 2023-07-08 RX ORDER — POTASSIUM CHLORIDE 20 MEQ
20 PACKET (EA) ORAL ONCE
Refills: 0 | Status: COMPLETED | OUTPATIENT
Start: 2023-07-08 | End: 2023-07-08

## 2023-07-08 RX ORDER — CARVEDILOL PHOSPHATE 80 MG/1
3.12 CAPSULE, EXTENDED RELEASE ORAL EVERY 12 HOURS
Refills: 0 | Status: DISCONTINUED | OUTPATIENT
Start: 2023-07-08 | End: 2023-07-08

## 2023-07-08 RX ORDER — DIGOXIN 250 MCG
125 TABLET ORAL DAILY
Refills: 0 | Status: DISCONTINUED | OUTPATIENT
Start: 2023-07-08 | End: 2023-07-10

## 2023-07-08 RX ORDER — MAGNESIUM SULFATE 500 MG/ML
1 VIAL (ML) INJECTION ONCE
Refills: 0 | Status: COMPLETED | OUTPATIENT
Start: 2023-07-08 | End: 2023-07-08

## 2023-07-08 RX ADMIN — Medication 100 GRAM(S): at 05:08

## 2023-07-08 RX ADMIN — Medication 20 MILLIEQUIVALENT(S): at 05:07

## 2023-07-08 RX ADMIN — CARVEDILOL PHOSPHATE 3.12 MILLIGRAM(S): 80 CAPSULE, EXTENDED RELEASE ORAL at 21:03

## 2023-07-08 RX ADMIN — CARVEDILOL PHOSPHATE 3.12 MILLIGRAM(S): 80 CAPSULE, EXTENDED RELEASE ORAL at 11:30

## 2023-07-08 RX ADMIN — SACUBITRIL AND VALSARTAN 1 TABLET(S): 24; 26 TABLET, FILM COATED ORAL at 05:07

## 2023-07-08 RX ADMIN — APIXABAN 5 MILLIGRAM(S): 2.5 TABLET, FILM COATED ORAL at 05:08

## 2023-07-08 RX ADMIN — Medication 250 MICROGRAM(S): at 02:02

## 2023-07-08 RX ADMIN — Medication 25 MILLIGRAM(S): at 15:35

## 2023-07-08 RX ADMIN — CHLORHEXIDINE GLUCONATE 1 APPLICATION(S): 213 SOLUTION TOPICAL at 05:08

## 2023-07-08 RX ADMIN — Medication 25 MILLIGRAM(S): at 06:02

## 2023-07-08 RX ADMIN — SACUBITRIL AND VALSARTAN 1 TABLET(S): 24; 26 TABLET, FILM COATED ORAL at 18:23

## 2023-07-08 RX ADMIN — BENZOCAINE AND MENTHOL 1 LOZENGE: 5; 1 LIQUID ORAL at 03:05

## 2023-07-08 RX ADMIN — APIXABAN 5 MILLIGRAM(S): 2.5 TABLET, FILM COATED ORAL at 18:23

## 2023-07-08 RX ADMIN — Medication 125 MICROGRAM(S): at 10:47

## 2023-07-08 NOTE — PROGRESS NOTE ADULT - ASSESSMENT
48y female with PHMx: of HTN, HF (EF 41%), Afib on Eliquis and metoprolol who presents to the ED for dizziness who was found to be in AFIB with RVR and ADHF 2/2 med noncompliance. Admitted to CCU for close HD monitoring.     Neuro:  no issues   - A/Ox3  - No issues     Resp:  - Not tachypneic   - On RA  - Patient was given Lasix     Cardiac:   A fib with RVR  - In setting of med noncompliance   - ECG with afib with RVR without ischemic changes. trops neg.  - HR 120s-140s with BP now 110s-130s but with warm extremities   - S/p lopressor x1 without effect  - Discontinued Digoxin, now on amiodarone load  - Previous TSH wnl   - TROY/DCCV today  - Zolls at bedside  - CHADS-VAS 3  - Continue Apixaban   - MAP goal>65    Acute on chronic systolic heart failure   - Euvolemic on exam    - Likely tachy induced   - NICM  - TTE: Severe segmental left ventricular systolicdysfunction.The septal, apical,  inferior, anterior, and anteroseptal are akinetic. The remaining walls are hypokinetic. LVEF calculated using biplane Arreola's method is 27%.  Sev MR  - Cath 5/23 non obstructive  - Continue Entresto    - Continue Lasix 20mg daily   - s/p bumex in ED. Cont with diuresis as needed  - Lact 3.7-->1.4, elevated LFTs     GI:  - NPO after MN  - Continue diet   - Transaminitis in setting of poor perfusion  - Cont to trend LFTS with perfusion markers     Renal:  GREY   - likely in setting of volume overload. clinically with LE edema   - s/p 1 L in ED and then diuresed. diuresis as above   - Cont to trend crn with diuresis   - replete lytes as appropriate     ID:  - afebrile, wbc nl   - Cont to monitor off abx     Heme:  - Cont home Eliquis for A/C for afib   - cbc stable    Endo:  - Previous TSH wnl    48y female with PHMx: of HTN, HF (EF 41%), Afib on Eliquis and metoprolol who presents to the ED for dizziness who was found to be in AFIB with RVR and ADHF 2/2 med noncompliance. Admitted to CCU for close HD monitoring.     Neuro:  no issues   - A/Ox3  - No issues     Resp:  - Not tachypneic   - On RA      Cardiac:   A fib with RVR  - ECG  with afib  with RVR without ischemic changes. trops neg.  - In setting of med noncompliance   - CHADS-VAS 3  - Previous TSH wnl   - TROY/DCCV on 7/7 showed LV thrombus  - change amiodarone to digoxin IV load  - Continue Apixaban   - MAP goal>65    Acute on chronic systolic heart failure   - Euvolemic on exam    - Likely tachy induced   - NICM on [facility] on 5/5/2023  - TTE: Severe segmental left ventricular systolicdysfunction.The septal, apical,  inferior, anterior, and anteroseptal are akinetic. The remaining walls are hypokinetic. LVEF calculated using biplane Arreola's method is 27%.  Sev MR  - Cath 5/23 non obstructive  - Continue Entresto    - d/c  Lasix 20mg on 7/7  -  Cont with diuresis as needed  - Lact 3.7-->1.4,-> 2.2    GI:  - Continue diet   - Transaminitis in setting of poor perfusion  - down trending  - Cont to trend LFTS with perfusion markers     Renal:  GREY   - likely in setting of overdiuresis.   - holding lasix  - replete lytes as appropriate     ID:  - afebrile, wbc nl   - Cont to monitor off abx     Heme:  - Cont home Eliquis for A/C for afib   - cbc stable    Endo:  - Previous TSH wnl    48y female with PHMx: of HTN, HF (EF 41%), Afib on Eliquis and metoprolol who presents to the ED for dizziness who was found to be in AFIB with RVR and ADHF 2/2 med noncompliance. Admitted to CCU for close HD monitoring.     Neuro:  no issues   - A/Ox3  - No issues     Resp:  - Not tachypneic   - On RA      Cardiac:   #A fib with RVR  - ECG  with afib  with RVR without ischemic changes. trops neg.  - In setting of med noncompliance   - CHADS-VAS 3  - Previous TSH wnl   - TROY/DCCV on 7/7 showed atrial thrombus  - change amiodarone to digoxin IV load  - Continue Apixaban   -start digoxin 125mcg and coreg 3.25mg Po bid  - MAP goal>65  - EP following    #Acute on chronic systolic heart failure   - Likely tachy induced   - Euvolemic on exam    - NICM on C on 5/5/2023  - TTE: Severe segmental left ventricular systolicdysfunction.The septal, apical,  inferior, anterior, and anteroseptal are akinetic. The remaining walls are hypokinetic. LVEF calculated using biplane Arreola's method is 27%.  Sev MR  - Cath 5/23 non obstructive  - Continue Entresto    - d/c standing  Lasix 20mg on 7/7  -  Cont with diuresis as needed  -start on coreg 3.25mg bid  - Lact 3.7-->1.4-> 2.2    GI:  - Continue diet   - Transaminitis in setting of poor perfusion  - down trending  - Cont to trend LFTS with perfusion markers     Renal:  #GREY   - likely in setting of overdiuresis.   - holding lasix  - replete lytes as appropriate     ID:  - afebrile, wbc nl   - Cont to monitor off abx     Heme:  - Cont home Eliquis for A/C for afib   - cbc stable    Endo:  - Previous TSH wnl    48y female with PHMx: of HTN, HF (EF 41%), Afib on Eliquis and metoprolol who presents to the ED for dizziness who was found to be in AFIB with RVR and ADHF 2/2 med noncompliance. Admitted to CCU for close HD monitoring.     Neuro:  no issues   - A/Ox3  - No issues     Resp:  - Not tachypneic   - On RA      Cardiac:   #A fib with RVR  - ECG  with afib  with RVR without ischemic changes. trops neg.  - In setting of med noncompliance   - CHADS-VAS 3  - Previous TSH wnl   - TROY/DCCV on 7/7 showed LA thrombus  - change amiodarone to digoxin IV load  - Continue Apixaban   -start digoxin 125mcg and coreg 3.25mg Po bid  - MAP goal>65  - EP following    #Acute on chronic systolic heart failure   - Likely tachy induced   - Euvolemic on exam    - NICM on C on 5/5/2023  - TTE: Severe segmental left ventricular systolicdysfunction.The septal, apical,  inferior, anterior, and anteroseptal are akinetic. The remaining walls are hypokinetic. LVEF calculated using biplane Arreola's method is 27%.  Sev MR  - Cath 5/23 non obstructive  - Continue Entresto    - d/c standing  Lasix 20mg on 7/7  -  Cont with diuresis as needed  -start on coreg 3.25mg bid  - Lact 3.7-->1.4-> 2.2    GI:  - Continue diet   - Transaminitis in setting of poor perfusion  - down trending  - Cont to trend LFTS with perfusion markers     Renal:  #GREY   - likely in setting of overdiuresis.   - holding lasix  - replete lytes as appropriate     ID:  - afebrile, wbc nl   - Cont to monitor off abx     Heme:  - Cont home Eliquis for A/C for afib   - cbc stable    Endo:  - Previous TSH wnl

## 2023-07-09 LAB
ALBUMIN SERPL ELPH-MCNC: 3.3 G/DL — SIGNIFICANT CHANGE UP (ref 3.3–5)
ALP SERPL-CCNC: 103 U/L — SIGNIFICANT CHANGE UP (ref 40–120)
ALT FLD-CCNC: 142 U/L — HIGH (ref 4–33)
ANION GAP SERPL CALC-SCNC: 9 MMOL/L — SIGNIFICANT CHANGE UP (ref 7–14)
AST SERPL-CCNC: 58 U/L — HIGH (ref 4–32)
BILIRUB SERPL-MCNC: 1 MG/DL — SIGNIFICANT CHANGE UP (ref 0.2–1.2)
BUN SERPL-MCNC: 28 MG/DL — HIGH (ref 7–23)
CALCIUM SERPL-MCNC: 9.1 MG/DL — SIGNIFICANT CHANGE UP (ref 8.4–10.5)
CHLORIDE SERPL-SCNC: 104 MMOL/L — SIGNIFICANT CHANGE UP (ref 98–107)
CO2 SERPL-SCNC: 25 MMOL/L — SIGNIFICANT CHANGE UP (ref 22–31)
CREAT SERPL-MCNC: 1.2 MG/DL — SIGNIFICANT CHANGE UP (ref 0.5–1.3)
DIGOXIN SERPL-MCNC: 2.4 NG/ML — HIGH (ref 0.8–2)
EGFR: 56 ML/MIN/1.73M2 — LOW
GLUCOSE SERPL-MCNC: 97 MG/DL — SIGNIFICANT CHANGE UP (ref 70–99)
HCT VFR BLD CALC: 48 % — HIGH (ref 34.5–45)
HGB BLD-MCNC: 15.5 G/DL — SIGNIFICANT CHANGE UP (ref 11.5–15.5)
INR BLD: 1.15 RATIO — SIGNIFICANT CHANGE UP (ref 0.88–1.16)
MAGNESIUM SERPL-MCNC: 2.3 MG/DL — SIGNIFICANT CHANGE UP (ref 1.6–2.6)
MCHC RBC-ENTMCNC: 32.3 GM/DL — SIGNIFICANT CHANGE UP (ref 32–36)
MCHC RBC-ENTMCNC: 32.5 PG — SIGNIFICANT CHANGE UP (ref 27–34)
MCV RBC AUTO: 100.6 FL — HIGH (ref 80–100)
NRBC # BLD: 0 /100 WBCS — SIGNIFICANT CHANGE UP (ref 0–0)
NRBC # FLD: 0 K/UL — SIGNIFICANT CHANGE UP (ref 0–0)
PHOSPHATE SERPL-MCNC: 3.6 MG/DL — SIGNIFICANT CHANGE UP (ref 2.5–4.5)
PLATELET # BLD AUTO: 260 K/UL — SIGNIFICANT CHANGE UP (ref 150–400)
POTASSIUM SERPL-MCNC: 4.1 MMOL/L — SIGNIFICANT CHANGE UP (ref 3.5–5.3)
POTASSIUM SERPL-SCNC: 4.1 MMOL/L — SIGNIFICANT CHANGE UP (ref 3.5–5.3)
PROT SERPL-MCNC: 6.3 G/DL — SIGNIFICANT CHANGE UP (ref 6–8.3)
PROTHROM AB SERPL-ACNC: 13.4 SEC — SIGNIFICANT CHANGE UP (ref 10.5–13.4)
RBC # BLD: 4.77 M/UL — SIGNIFICANT CHANGE UP (ref 3.8–5.2)
RBC # FLD: 17.2 % — HIGH (ref 10.3–14.5)
SODIUM SERPL-SCNC: 138 MMOL/L — SIGNIFICANT CHANGE UP (ref 135–145)
WBC # BLD: 4.27 K/UL — SIGNIFICANT CHANGE UP (ref 3.8–10.5)
WBC # FLD AUTO: 4.27 K/UL — SIGNIFICANT CHANGE UP (ref 3.8–10.5)

## 2023-07-09 RX ORDER — SENNA PLUS 8.6 MG/1
2 TABLET ORAL AT BEDTIME
Refills: 0 | Status: DISCONTINUED | OUTPATIENT
Start: 2023-07-09 | End: 2023-07-12

## 2023-07-09 RX ORDER — SACUBITRIL AND VALSARTAN 24; 26 MG/1; MG/1
1 TABLET, FILM COATED ORAL
Refills: 0 | Status: DISCONTINUED | OUTPATIENT
Start: 2023-07-09 | End: 2023-07-12

## 2023-07-09 RX ADMIN — CARVEDILOL PHOSPHATE 3.12 MILLIGRAM(S): 80 CAPSULE, EXTENDED RELEASE ORAL at 21:24

## 2023-07-09 RX ADMIN — APIXABAN 5 MILLIGRAM(S): 2.5 TABLET, FILM COATED ORAL at 17:15

## 2023-07-09 RX ADMIN — Medication 125 MICROGRAM(S): at 05:22

## 2023-07-09 RX ADMIN — APIXABAN 5 MILLIGRAM(S): 2.5 TABLET, FILM COATED ORAL at 05:20

## 2023-07-09 RX ADMIN — CHLORHEXIDINE GLUCONATE 1 APPLICATION(S): 213 SOLUTION TOPICAL at 05:20

## 2023-07-09 RX ADMIN — SACUBITRIL AND VALSARTAN 1 TABLET(S): 24; 26 TABLET, FILM COATED ORAL at 05:20

## 2023-07-09 RX ADMIN — SACUBITRIL AND VALSARTAN 1 TABLET(S): 24; 26 TABLET, FILM COATED ORAL at 17:16

## 2023-07-09 RX ADMIN — CARVEDILOL PHOSPHATE 3.12 MILLIGRAM(S): 80 CAPSULE, EXTENDED RELEASE ORAL at 10:50

## 2023-07-09 NOTE — PROGRESS NOTE ADULT - SUBJECTIVE AND OBJECTIVE BOX
PATIENT:  SANTY BREAUX  [id number]    CHIEF COMPLAINT:  Patient is a 48y old  Female who presents with a chief complaint of afib with rvr (2023 09:00)      · Subjective and Objective:   HPi: 48 year old woman with a pmhx of HTN, CHF, Afib on Eliquis and metoprolol who presents to the ED for dizziness who was found to be in AFIB with RVR and acute decompensated heart failure s/p digoxin load and now starting amiodarone with plans to TROY/DCCV on 2023.    Subjective/Objective: patient alert, awake. Denies chest pain, palpitation, sob    Tele event: afib with t PVCs, Bigeminy -120    overnight: Ambulated to BR HR, HR increased to 180. Returned to normal when back in bed. C/o R hand weakness. Neuro exam without finding. strength equal bilatrally    MEDICATIONS    apixaban 5 milliGRAM(s) Oral every 12 hours  hydrALAZINE 25 milliGRAM(s) Oral three times a day  sacubitril 24 mG/valsartan 26 mG 1 Tablet(s) Oral two times a day  benzocaine/menthol Lozenge 1 Lozenge Oral four times a day PRN  chlorhexidine 2% Cloths 1 Application(s) Topical <User Schedule>        ICU Vital Signs Last 24 Hrs  T(C): 36.2 (2023 06:00), Max: 36.7 (2023 15:48)  T(F): 97.1 (2023 06:00), Max: 98 (2023 15:48)  HR: 82 (2023 08:00) (82 - 197)  BP: 109/84 (2023 08:00) (102/81 - 172/154)  BP(mean): 91 (2023 08:00) (82 - 161)    RR: 14 (2023 08:00) (11 - 34)  SpO2: 96% (2023 08:00) (92% - 100%)    O2 Parameters below as of 2023 06:00  Patient On (Oxygen Delivery Method): room air            PHYSICAL EXAMINATION  Appearance: NAD, no distress  HEENT: Moist Mucous Membranes, Anicteric, PERRL, EOMI  Cardiovascular: Regular rate and rhythm, Normal S1 S2, No JVD, No murmurs  Respiratory: Lungs clear to auscultation. No rales, No rhonchi, No wheezing.  Gastrointestinal:  Soft, Non-tender, + BS  Neurologic: Non-focal, A&Ox3  Skin: Warm and dry, No rashes, No ecchymosis, No cyanosis  Musculoskeletal: No clubbing, No cyanosis, No edema  Vascular: Peripheral pulses palpable 2+ bilaterally  Psychiatry: Mood & affect appropriate    ALLERGIES:  Allergies    penicillin (Other)  shellfish (Angioedema)    Intolerances        OBJECTIVE:  ICU Vital Signs Last 24 Hrs  T(C): 36.7 (2023 04:00), Max: 36.9 (2023 00:00)  T(F): 98.1 (2023 04:00), Max: 98.5 (2023 00:00)  HR: 85 (2023 06:00) (81 - 129)  BP: 133/99 (2023 06:00) (91/63 - 133/99)  BP(mean): 112 (2023 06:00) (66 - 123)  ABP: --  ABP(mean): --  RR: 12 (2023 06:00) (12 - 25)  SpO2: 100% (2023 06:00) (92% - 100%)    O2 Parameters below as of 2023 06:00  Patient On (Oxygen Delivery Method): room air            Adult Advanced Hemodynamics Last 24 Hrs  CVP(mm Hg): --  CVP(cm H2O): --  CO: --  CI: --  PA: --  PA(mean): --  PCWP: --  SVR: --  SVRI: --  PVR: --  PVRI: --  CAPILLARY BLOOD GLUCOSE        CAPILLARY BLOOD GLUCOSE        I&O's Summary    2023 07:01  -  2023 07:00  --------------------------------------------------------  IN: 930 mL / OUT: 1025 mL / NET: -95 mL      Daily     Daily Weight in k.7 (2023 05:00)        LABS:                          15.5   4.27  )-----------( 260      ( 2023 05:09 )             48.0     07-09    138  |  104  |  28<H>  ----------------------------<  97  4.1   |  25  |  1.20    Ca    9.1      2023 05:09  Phos  3.6     07-09  Mg     2.30     07-    TPro  6.3  /  Alb  3.3  /  TBili  1.0  /  DBili  x   /  AST  58<H>  /  ALT  142<H>  /  AlkPhos  103  07-09    LIVER FUNCTIONS - ( 2023 05:09 )  Alb: 3.3 g/dL / Pro: 6.3 g/dL / ALK PHOS: 103 U/L / ALT: 142 U/L / AST: 58 U/L / GGT: x           PT/INR - ( 2023 05:09 )   PT: 13.4 sec;   INR: 1.15 ratio         PTT - ( 2023 02:17 )  PTT:31.3 sec        Urinalysis Basic - ( 2023 05:09 )    Color: x / Appearance: x / SG: x / pH: x  Gluc: 97 mg/dL / Ketone: x  / Bili: x / Urobili: x   Blood: x / Protein: x / Nitrite: x   Leuk Esterase: x / RBC: x / WBC x   Sq Epi: x / Non Sq Epi: x / Bacteria: x        Assessment      Assessment and Plan:   · Assessment    48y female with PHMx: of HTN, HF (EF 41%), Afib on Eliquis and metoprolol who presents to the ED for dizziness who was found to be in AFIB with RVR and ADHF 2/2 med noncompliance. Admitted to CCU for close HD monitoring.     Neuro:  no issues   - A/Ox3  - No issues     Resp:  - Not tachypneic   - On RA      Cardiac:   #A fib with RVR  - ECG  with afib  with RVR without ischemic changes. trops neg.  - In setting of med noncompliance   - CHADS-VAS 3  - Previous TSH wnl   - TROY/DCCV on  showed LA thrombus  - change amiodarone to digoxin IV load  - Continue Apixaban   -start digoxin 125mcg and coreg 3.25mg Po bid  - Increased Coreg to 6.25 mg  - MAP goal>65  - EP following    #Acute on chronic systolic heart failure   - Likely tachy induced   - Euvolemic on exam    - NICM on C on 2023  - TTE: Severe segmental left ventricular systolicdysfunction.The septal, apical,  inferior, anterior, and anteroseptal are akinetic. The remaining walls are hypokinetic. LVEF calculated using biplane Arreola's method is 27%.  Sev MR  - Cath  non obstructive  - Continue Entresto    - d/c standing  Lasix 20mg on   -  Cont with diuresis as needed  -start on coreg 3.25mg bid  - Lact 3.7-->1.4-> 2.2    GI:  - Continue diet   - Transaminitis in setting of poor perfusion  - down trending  - Cont to trend LFTS with perfusion markers     Renal:  #GREY   - likely in setting of overdiuresis.   - holding lasix  - replete lytes as appropriate     ID:  - afebrile, wbc nl   - Cont to monitor off abx     Heme:  - Cont home Eliquis for A/C for afib   - cbc stable    Endo:  - Previous TSH wnl                  PATIENT:  SANTY BREAUX  [id number]    CHIEF COMPLAINT:  Patient is a 48y old  Female who presents with a chief complaint of afib with rvr (2023 09:00)      · Subjective and Objective:   HPi: 48 year old woman with a pmhx of HTN, CHF, Afib on Eliquis and metoprolol who presents to the ED for dizziness who was found to be in AFIB with RVR and acute decompensated heart failure s/p digoxin load and now starting amiodarone with plans to TROY/DCCV on 2023.    Subjective/Objective: patient alert, awake. Denies chest pain, palpitation, sob    Tele event: afib with t PVCs, Bigeminy -120    overnight: Ambulated to BR HR, strained during BM. HR increased to 180. Returned to normal when back in bed. C/o R hand weakness. Neuro exam without finding. strength equal bilatrally    MEDICATIONS    apixaban 5 milliGRAM(s) Oral every 12 hours  hydrALAZINE 25 milliGRAM(s) Oral three times a day  sacubitril 24 mG/valsartan 26 mG 1 Tablet(s) Oral two times a day  benzocaine/menthol Lozenge 1 Lozenge Oral four times a day PRN  chlorhexidine 2% Cloths 1 Application(s) Topical <User Schedule>        ICU Vital Signs Last 24 Hrs  T(C): 36.2 (2023 06:00), Max: 36.7 (2023 15:48)  T(F): 97.1 (2023 06:00), Max: 98 (2023 15:48)  HR: 82 (2023 08:00) (82 - 197)  BP: 109/84 (2023 08:00) (102/81 - 172/154)  BP(mean): 91 (2023 08:00) (82 - 161)    RR: 14 (2023 08:00) (11 - 34)  SpO2: 96% (2023 08:00) (92% - 100%)    O2 Parameters below as of 2023 06:00  Patient On (Oxygen Delivery Method): room air            PHYSICAL EXAMINATION  Appearance: NAD, no distress  HEENT: Moist Mucous Membranes, Anicteric, PERRL, EOMI  Cardiovascular: Regular rate and rhythm, Normal S1 S2, No JVD, No murmurs  Respiratory: Lungs clear to auscultation. No rales, No rhonchi, No wheezing.  Gastrointestinal:  Soft, Non-tender, + BS  Neurologic: Non-focal, A&Ox3  Skin: Warm and dry, No rashes, No ecchymosis, No cyanosis  Musculoskeletal: No clubbing, No cyanosis, No edema  Vascular: Peripheral pulses palpable 2+ bilaterally  Psychiatry: Mood & affect appropriate    ALLERGIES:  Allergies    penicillin (Other)  shellfish (Angioedema)    Intolerances        OBJECTIVE:  ICU Vital Signs Last 24 Hrs  T(C): 36.7 (2023 04:00), Max: 36.9 (2023 00:00)  T(F): 98.1 (2023 04:00), Max: 98.5 (2023 00:00)  HR: 85 (2023 06:00) (81 - 129)  BP: 133/99 (2023 06:00) (91/63 - 133/99)  BP(mean): 112 (2023 06:00) (66 - 123)  ABP: --  ABP(mean): --  RR: 12 (2023 06:00) (12 - 25)  SpO2: 100% (2023 06:00) (92% - 100%)    O2 Parameters below as of 2023 06:00  Patient On (Oxygen Delivery Method): room air            Adult Advanced Hemodynamics Last 24 Hrs  CVP(mm Hg): --  CVP(cm H2O): --  CO: --  CI: --  PA: --  PA(mean): --  PCWP: --  SVR: --  SVRI: --  PVR: --  PVRI: --  CAPILLARY BLOOD GLUCOSE        CAPILLARY BLOOD GLUCOSE        I&O's Summary    2023 07:01  -  2023 07:00  --------------------------------------------------------  IN: 930 mL / OUT: 1025 mL / NET: -95 mL      Daily     Daily Weight in k.7 (2023 05:00)        LABS:                          15.5   4.27  )-----------( 260      ( 2023 05:09 )             48.0     07-    138  |  104  |  28<H>  ----------------------------<  97  4.1   |  25  |  1.20    Ca    9.1      2023 05:09  Phos  3.6     07-09  Mg     2.30     07-    TPro  6.3  /  Alb  3.3  /  TBili  1.0  /  DBili  x   /  AST  58<H>  /  ALT  142<H>  /  AlkPhos  103  07-09    LIVER FUNCTIONS - ( 2023 05:09 )  Alb: 3.3 g/dL / Pro: 6.3 g/dL / ALK PHOS: 103 U/L / ALT: 142 U/L / AST: 58 U/L / GGT: x           PT/INR - ( 2023 05:09 )   PT: 13.4 sec;   INR: 1.15 ratio         PTT - ( 2023 02:17 )  PTT:31.3 sec        Urinalysis Basic - ( 2023 05:09 )    Color: x / Appearance: x / SG: x / pH: x  Gluc: 97 mg/dL / Ketone: x  / Bili: x / Urobili: x   Blood: x / Protein: x / Nitrite: x   Leuk Esterase: x / RBC: x / WBC x   Sq Epi: x / Non Sq Epi: x / Bacteria: x        Assessment      Assessment and Plan:   · Assessment    48y female with PHMx: of HTN, HF (EF 41%), Afib on Eliquis and metoprolol who presents to the ED for dizziness who was found to be in AFIB with RVR and ADHF 2/2 med noncompliance. Admitted to CCU for close HD monitoring.     Neuro:  no issues   - A/Ox3  - No issues     Resp:  - Not tachypneic   - On RA      Cardiac:   #A fib with RVR  - ECG  with afib  with RVR without ischemic changes. trops neg.  - In setting of med noncompliance   - CHADS-VAS 3  - Previous TSH wnl   - TROY/DCCV on  showed LA thrombus  - change amiodarone to digoxin IV load  - Continue Apixaban   -start digoxin 125mcg and coreg 3.25mg Po bid  - Increased Coreg to 6.25 mg  - MAP goal>65  - EP following    #Acute on chronic systolic heart failure   - Likely tachy induced   - Euvolemic on exam    - NICM on [facility] on 2023  - TTE: Severe segmental left ventricular systolicdysfunction.The septal, apical,  inferior, anterior, and anteroseptal are akinetic. The remaining walls are hypokinetic. LVEF calculated using biplane Arreola's method is 27%.  Sev MR  - Cath  non obstructive  - Continue Entresto    - d/c standing  Lasix 20mg on   -  restart home lasix 20 mg daily when creatinine normalizes  -start on coreg 3.25mg bid  - Lact 3.7-->1.4-> 2.2    GI:  - Continue diet   - Transaminitis in setting of poor perfusion  - down trending  - Cont to trend LFTS with perfusion markers  - Bowel regime - start senna    Renal:  #GREY   - likely in setting of overdiuresis.   - holding lasix  - replete lytes as appropriate     ID:  - afebrile, wbc nl   - Cont to monitor off abx     Heme:  - Cont home Eliquis for A/C for afib   - cbc stable    Endo:  - Previous TSH wnl

## 2023-07-10 LAB
ALBUMIN SERPL ELPH-MCNC: 3.3 G/DL — SIGNIFICANT CHANGE UP (ref 3.3–5)
ALP SERPL-CCNC: 102 U/L — SIGNIFICANT CHANGE UP (ref 40–120)
ALT FLD-CCNC: 123 U/L — HIGH (ref 4–33)
ANION GAP SERPL CALC-SCNC: 12 MMOL/L — SIGNIFICANT CHANGE UP (ref 7–14)
AST SERPL-CCNC: 53 U/L — HIGH (ref 4–32)
BILIRUB SERPL-MCNC: 0.9 MG/DL — SIGNIFICANT CHANGE UP (ref 0.2–1.2)
BUN SERPL-MCNC: 22 MG/DL — SIGNIFICANT CHANGE UP (ref 7–23)
CALCIUM SERPL-MCNC: 9.5 MG/DL — SIGNIFICANT CHANGE UP (ref 8.4–10.5)
CHLORIDE SERPL-SCNC: 103 MMOL/L — SIGNIFICANT CHANGE UP (ref 98–107)
CO2 SERPL-SCNC: 26 MMOL/L — SIGNIFICANT CHANGE UP (ref 22–31)
CREAT SERPL-MCNC: 1.18 MG/DL — SIGNIFICANT CHANGE UP (ref 0.5–1.3)
EGFR: 57 ML/MIN/1.73M2 — LOW
GLUCOSE SERPL-MCNC: 101 MG/DL — HIGH (ref 70–99)
HCT VFR BLD CALC: 48.6 % — HIGH (ref 34.5–45)
HGB BLD-MCNC: 15.7 G/DL — HIGH (ref 11.5–15.5)
MAGNESIUM SERPL-MCNC: 2.2 MG/DL — SIGNIFICANT CHANGE UP (ref 1.6–2.6)
MCHC RBC-ENTMCNC: 32.3 GM/DL — SIGNIFICANT CHANGE UP (ref 32–36)
MCHC RBC-ENTMCNC: 33.5 PG — SIGNIFICANT CHANGE UP (ref 27–34)
MCV RBC AUTO: 103.6 FL — HIGH (ref 80–100)
NRBC # BLD: 0 /100 WBCS — SIGNIFICANT CHANGE UP (ref 0–0)
NRBC # FLD: 0 K/UL — SIGNIFICANT CHANGE UP (ref 0–0)
PHOSPHATE SERPL-MCNC: 4 MG/DL — SIGNIFICANT CHANGE UP (ref 2.5–4.5)
PLATELET # BLD AUTO: 268 K/UL — SIGNIFICANT CHANGE UP (ref 150–400)
POTASSIUM SERPL-MCNC: 4.9 MMOL/L — SIGNIFICANT CHANGE UP (ref 3.5–5.3)
POTASSIUM SERPL-SCNC: 4.9 MMOL/L — SIGNIFICANT CHANGE UP (ref 3.5–5.3)
PROT SERPL-MCNC: 6.3 G/DL — SIGNIFICANT CHANGE UP (ref 6–8.3)
RBC # BLD: 4.69 M/UL — SIGNIFICANT CHANGE UP (ref 3.8–5.2)
RBC # FLD: 17.1 % — HIGH (ref 10.3–14.5)
SODIUM SERPL-SCNC: 141 MMOL/L — SIGNIFICANT CHANGE UP (ref 135–145)
WBC # BLD: 3.93 K/UL — SIGNIFICANT CHANGE UP (ref 3.8–10.5)
WBC # FLD AUTO: 3.93 K/UL — SIGNIFICANT CHANGE UP (ref 3.8–10.5)

## 2023-07-10 PROCEDURE — 99233 SBSQ HOSP IP/OBS HIGH 50: CPT

## 2023-07-10 RX ORDER — LANOLIN ALCOHOL/MO/W.PET/CERES
3 CREAM (GRAM) TOPICAL AT BEDTIME
Refills: 0 | Status: DISCONTINUED | OUTPATIENT
Start: 2023-07-10 | End: 2023-07-12

## 2023-07-10 RX ORDER — CARVEDILOL PHOSPHATE 80 MG/1
6.25 CAPSULE, EXTENDED RELEASE ORAL ONCE
Refills: 0 | Status: COMPLETED | OUTPATIENT
Start: 2023-07-10 | End: 2023-07-10

## 2023-07-10 RX ORDER — CARVEDILOL PHOSPHATE 80 MG/1
6.25 CAPSULE, EXTENDED RELEASE ORAL EVERY 12 HOURS
Refills: 0 | Status: DISCONTINUED | OUTPATIENT
Start: 2023-07-10 | End: 2023-07-12

## 2023-07-10 RX ORDER — DAPAGLIFLOZIN 10 MG/1
10 TABLET, FILM COATED ORAL EVERY 24 HOURS
Refills: 0 | Status: DISCONTINUED | OUTPATIENT
Start: 2023-07-10 | End: 2023-07-12

## 2023-07-10 RX ORDER — ACETAMINOPHEN 500 MG
650 TABLET ORAL ONCE
Refills: 0 | Status: COMPLETED | OUTPATIENT
Start: 2023-07-10 | End: 2023-07-10

## 2023-07-10 RX ADMIN — SACUBITRIL AND VALSARTAN 1 TABLET(S): 24; 26 TABLET, FILM COATED ORAL at 06:07

## 2023-07-10 RX ADMIN — DAPAGLIFLOZIN 10 MILLIGRAM(S): 10 TABLET, FILM COATED ORAL at 14:34

## 2023-07-10 RX ADMIN — APIXABAN 5 MILLIGRAM(S): 2.5 TABLET, FILM COATED ORAL at 06:07

## 2023-07-10 RX ADMIN — APIXABAN 5 MILLIGRAM(S): 2.5 TABLET, FILM COATED ORAL at 17:35

## 2023-07-10 RX ADMIN — SENNA PLUS 2 TABLET(S): 8.6 TABLET ORAL at 21:32

## 2023-07-10 RX ADMIN — Medication 3 MILLIGRAM(S): at 01:44

## 2023-07-10 RX ADMIN — Medication 650 MILLIGRAM(S): at 23:25

## 2023-07-10 RX ADMIN — Medication 3 MILLIGRAM(S): at 21:32

## 2023-07-10 RX ADMIN — Medication 650 MILLIGRAM(S): at 22:25

## 2023-07-10 RX ADMIN — Medication 125 MICROGRAM(S): at 06:07

## 2023-07-10 RX ADMIN — CHLORHEXIDINE GLUCONATE 1 APPLICATION(S): 213 SOLUTION TOPICAL at 06:35

## 2023-07-10 RX ADMIN — SACUBITRIL AND VALSARTAN 1 TABLET(S): 24; 26 TABLET, FILM COATED ORAL at 17:35

## 2023-07-10 RX ADMIN — CARVEDILOL PHOSPHATE 6.25 MILLIGRAM(S): 80 CAPSULE, EXTENDED RELEASE ORAL at 17:36

## 2023-07-10 RX ADMIN — CARVEDILOL PHOSPHATE 6.25 MILLIGRAM(S): 80 CAPSULE, EXTENDED RELEASE ORAL at 10:26

## 2023-07-10 NOTE — PROGRESS NOTE ADULT - SUBJECTIVE AND OBJECTIVE BOX
Telemetry:   No acute events overnight  Telemetry: course afib - rate controlled     MEDICATIONS  (STANDING):  apixaban 5 milliGRAM(s) Oral every 12 hours  carvedilol 6.25 milliGRAM(s) Oral every 12 hours  chlorhexidine 2% Cloths 1 Application(s) Topical <User Schedule>  digoxin     Tablet 125 MICROGram(s) Oral daily  melatonin 3 milliGRAM(s) Oral at bedtime  sacubitril 49 mG/valsartan 51 mG 1 Tablet(s) Oral two times a day  senna 2 Tablet(s) Oral at bedtime    MEDICATIONS  (PRN):  benzocaine/menthol Lozenge 1 Lozenge Oral four times a day PRN Sore Throat    Vital Signs Last 24 Hrs  T(C): 36.7 (07-10-23 @ 08:00), Max: 36.9 (07-09-23 @ 16:00)  T(F): 98 (07-10-23 @ 08:00), Max: 98.4 (07-09-23 @ 16:00)  HR: 97 (07-10-23 @ 09:00) (81 - 121)  BP: 121/76 (07-10-23 @ 09:00) (111/83 - 150/94)  BP(mean): 89 (07-10-23 @ 09:00) (85 - 122)  RR: 22 (07-10-23 @ 09:00) (12 - 34)  SpO2: 97% (07-10-23 @ 09:00) (91% - 100%)    Appearance: Normal	  HEENT:   Normal oral mucosa, PERRL, EOMI	  Lymphatic: No lymphadenopathy  Cardiovascular: No JVD, No murmurs, No edema  Respiratory: Lungs clear to auscultation	  Psychiatry: A & O x 3, Mood & affect appropriate  Gastrointestinal:  Soft, Non-tender, + BS	  Skin: No rashes, No ecchymoses, No cyanosis	  Neurologic: Non-focal  Extremities: Normal range of motion, No clubbing, cyanosis or edema  Vascular: Peripheral pulses palpable 2+ bilaterally    LABS:	 	    CBC Full  -  ( 10 Jul 2023 06:30 )  WBC Count : 3.93 K/uL  Hemoglobin : 15.7 g/dL  Hematocrit : 48.6 %  Platelet Count - Automated : 268 K/uL  Mean Cell Volume : 103.6 fL  Mean Cell Hemoglobin : 33.5 pg  Mean Cell Hemoglobin Concentration : 32.3 gm/dL  Auto Neutrophil # : x  Auto Lymphocyte # : x  Auto Monocyte # : x  Auto Eosinophil # : x  Auto Basophil # : x  Auto Neutrophil % : x  Auto Lymphocyte % : x  Auto Monocyte % : x  Auto Eosinophil % : x  Auto Basophil % : x    07-10    141  |  103  |  22  ----------------------------<  101<H>  4.9   |  26  |  1.18  07-09    138  |  104  |  28<H>  ----------------------------<  97  4.1   |  25  |  1.20    Ca    9.5      10 Jul 2023 06:30  Ca    9.1      09 Jul 2023 05:09  Phos  4.0     07-10  Phos  3.6     07-09  Mg     2.20     07-10  Mg     2.30     07-09    TPro  6.3  /  Alb  3.3  /  TBili  0.9  /  DBili  x   /  AST  53<H>  /  ALT  123<H>  /  AlkPhos  102  07-10  TPro  6.3  /  Alb  3.3  /  TBili  1.0  /  DBili  x   /  AST  58<H>  /  ALT  142<H>  /  AlkPhos  103  07-09    PT/INR - ( 09 Jul 2023 05:09 )   PT: 13.4 sec;   INR: 1.15 ratio        LIVER FUNCTIONS - ( 10 Jul 2023 06:30 )  Alb: 3.3 g/dL / Pro: 6.3 g/dL / ALK PHOS: 102 U/L / ALT: 123 U/L / AST: 53 U/L / GGT: x

## 2023-07-10 NOTE — PROGRESS NOTE ADULT - ASSESSMENT
This is a 48 year old woman with a pmhx of HTN, CHF, Afib on Eliquis and metoprolol who presents to the ED for dizziness who was found to be in AFIB with RVR and acute decompensated heart failure s/p digoxin load and now starting amiodarone with plans to TROY/DCCV on 7/7/2023. The risks and benefits was explained in detail which included but not limited to bleeding requiring transfusion, infection, stroke, plural effusion, esophageal injury, pericardial effusion, and intubation. Patient expressed understanding and all questions were answered.     Plan:  - Continuous telemetric monitoring  - Monitor electrolytes and replete K to 4 and Mg to 2  - Continue Eliquis for thromboembolic ppx  given that patient has a [id number] score of 3  - Will follow with EPS outpatient

## 2023-07-10 NOTE — CHART NOTE - NSCHARTNOTEFT_GEN_A_CORE
CCU Transfer Note    Transfer from: CCU  Transfer to:  (  ) Medicine    (  ) Telemetry    (  ) RCU    (  ) Palliative    (  ) Stroke Unit    (  ) _______________  Accepting physician:    MEDICATIONS:  STANDING MEDICATIONS  apixaban 5 milliGRAM(s) Oral every 12 hours  carvedilol 3.125 milliGRAM(s) Oral every 12 hours  chlorhexidine 2% Cloths 1 Application(s) Topical <User Schedule>  digoxin     Tablet 125 MICROGram(s) Oral daily  melatonin 3 milliGRAM(s) Oral at bedtime  sacubitril 49 mG/valsartan 51 mG 1 Tablet(s) Oral two times a day  senna 2 Tablet(s) Oral at bedtime    PRN MEDICATIONS  benzocaine/menthol Lozenge 1 Lozenge Oral four times a day PRN      VITAL SIGNS: Last 24 Hours  T(C): 36.7 (10 Jul 2023 08:00), Max: 36.9 (09 Jul 2023 16:00)  T(F): 98 (10 Jul 2023 08:00), Max: 98.4 (09 Jul 2023 16:00)  HR: 86 (10 Jul 2023 08:00) (81 - 121)  BP: 132/100 (10 Jul 2023 08:00) (111/83 - 150/94)  BP(mean): 112 (10 Jul 2023 08:00) (85 - 122)  RR: 20 (10 Jul 2023 08:00) (12 - 34)  SpO2: 100% (10 Jul 2023 08:00) (91% - 100%)    LABS:                        15.7   3.93  )-----------( 268      ( 10 Jul 2023 06:30 )             48.6     07-10    141  |  103  |  22  ----------------------------<  101<H>  4.9   |  26  |  1.18    Ca    9.5      10 Jul 2023 06:30  Phos  4.0     07-10  Mg     2.20     07-10    TPro  6.3  /  Alb  3.3  /  TBili  0.9  /  DBili  x   /  AST  53<H>  /  ALT  123<H>  /  AlkPhos  102  07-10    PT/INR - ( 09 Jul 2023 05:09 )   PT: 13.4 sec;   INR: 1.15 ratio           Urinalysis Basic - ( 10 Jul 2023 06:30 )    Color: x / Appearance: x / SG: x / pH: x  Gluc: 101 mg/dL / Ketone: x  / Bili: x / Urobili: x   Blood: x / Protein: x / Nitrite: x   Leuk Esterase: x / RBC: x / WBC x   Sq Epi: x / Non Sq Epi: x / Bacteria: x              RADIOLOGY:    CCU COURSE:          ASSESSMENT & PLAN:         For Follow-Up: CCU Transfer Note    Transfer from: CCU  Transfer to:  (  ) Medicine    (  ) Telemetry    (  ) RCU    (  ) Palliative    (  ) Stroke Unit    (  ) _______________  Accepting physician:  sign out to ACP :    MEDICATIONS:  STANDING MEDICATIONS  apixaban 5 milliGRAM(s) Oral every 12 hours  carvedilol 3.125 milliGRAM(s) Oral every 12 hours  chlorhexidine 2% Cloths 1 Application(s) Topical <User Schedule>  digoxin     Tablet 125 MICROGram(s) Oral daily  melatonin 3 milliGRAM(s) Oral at bedtime  sacubitril 49 mG/valsartan 51 mG 1 Tablet(s) Oral two times a day  senna 2 Tablet(s) Oral at bedtime    PRN MEDICATIONS  benzocaine/menthol Lozenge 1 Lozenge Oral four times a day PRN      VITAL SIGNS: Last 24 Hours  T(C): 36.7 (10 Jul 2023 08:00), Max: 36.9 (09 Jul 2023 16:00)  T(F): 98 (10 Jul 2023 08:00), Max: 98.4 (09 Jul 2023 16:00)  HR: 86 (10 Jul 2023 08:00) (81 - 121)  BP: 132/100 (10 Jul 2023 08:00) (111/83 - 150/94)  BP(mean): 112 (10 Jul 2023 08:00) (85 - 122)  RR: 20 (10 Jul 2023 08:00) (12 - 34)  SpO2: 100% (10 Jul 2023 08:00) (91% - 100%)    LABS:                        15.7   3.93  )-----------( 268      ( 10 Jul 2023 06:30 )             48.6     07-10    141  |  103  |  22  ----------------------------<  101<H>  4.9   |  26  |  1.18    Ca    9.5      10 Jul 2023 06:30  Phos  4.0     07-10  Mg     2.20     07-10    TPro  6.3  /  Alb  3.3  /  TBili  0.9  /  DBili  x   /  AST  53<H>  /  ALT  123<H>  /  AlkPhos  102  07-10    PT/INR - ( 09 Jul 2023 05:09 )   PT: 13.4 sec;   INR: 1.15 ratio           Urinalysis Basic - ( 10 Jul 2023 06:30 )    Color: x / Appearance: x / SG: x / pH: x  Gluc: 101 mg/dL / Ketone: x  / Bili: x / Urobili: x   Blood: x / Protein: x / Nitrite: x   Leuk Esterase: x / RBC: x / WBC x   Sq Epi: x / Non Sq Epi: x / Bacteria: x              RADIOLOGY:    CCU COURSE:          ASSESSMENT & PLAN:         For Follow-Up: CCU Transfer Note    Transfer from: CCU  Transfer to:  (  ) Medicine    (X ) Telemetry    (  ) RCU    (  ) Palliative    (  ) Stroke Unit    (  ) _______________  Accepting physician:  Sign out to ACP :    MEDICATIONS:  STANDING MEDICATIONS  apixaban 5 milliGRAM(s) Oral every 12 hours  carvedilol 6.125 milliGRAM(s) Oral every 12 hours  chlorhexidine 2% Cloths 1 Application(s) Topical <User Schedule>  digoxin     Tablet 125 MICROGram(s) Oral daily  melatonin 3 milliGRAM(s) Oral at bedtime  sacubitril 49 mG/valsartan 51 mG 1 Tablet(s) Oral two times a day  senna 2 Tablet(s) Oral at bedtime    PRN MEDICATIONS  benzocaine/menthol Lozenge 1 Lozenge Oral four times a day PRN      VITAL SIGNS: Last 24 Hours  T(C): 36.7 (10 Jul 2023 08:00), Max: 36.9 (09 Jul 2023 16:00)  T(F): 98 (10 Jul 2023 08:00), Max: 98.4 (09 Jul 2023 16:00)  HR: 86 (10 Jul 2023 08:00) (81 - 121)  BP: 132/100 (10 Jul 2023 08:00) (111/83 - 150/94)  BP(mean): 112 (10 Jul 2023 08:00) (85 - 122)  RR: 20 (10 Jul 2023 08:00) (12 - 34)  SpO2: 100% (10 Jul 2023 08:00) (91% - 100%)    LABS:                        15.7   3.93  )-----------( 268      ( 10 Jul 2023 06:30 )             48.6     07-10    141  |  103  |  22  ----------------------------<  101<H>  4.9   |  26  |  1.18    Ca    9.5      10 Jul 2023 06:30  Phos  4.0     07-10  Mg     2.20     07-10    TPro  6.3  /  Alb  3.3  /  TBili  0.9  /  DBili  x   /  AST  53<H>  /  ALT  123<H>  /  AlkPhos  102  07-10    PT/INR - ( 09 Jul 2023 05:09 )   PT: 13.4 sec;   INR: 1.15 ratio           Urinalysis Basic - ( 10 Jul 2023 06:30 )    Color: x / Appearance: x / SG: x / pH: x  Gluc: 101 mg/dL / Ketone: x  / Bili: x / Urobili: x   Blood: x / Protein: x / Nitrite: x   Leuk Esterase: x / RBC: x / WBC x   Sq Epi: x / Non Sq Epi: x / Bacteria: x        CCU COURSE:  48y female with pmhx of HTN, CHF (EF 41%), Afib on Eliquis and metoprolol who presented to the ED for dizziness and found to be in AFIB with RVR. Patient states that her medication was changed last week from carvedilol to metoprolol (she was on maximum dose carvedilol but still not achieving rate control). She reports worsening dizziness and fatigue on this medication, reports medication noncompliance as well as 1 week of diarrhea.    Pt previously admitted to [facility] 5/8; at that time presented w/ 2 weeks of nausea, fatigue and generalized weakness, shortly after a vacation in [place] with recreational alcohol binging. She had been transferred over to [facility] for a diagnostic LHC in the setting of NSTEMI (without EKG changes or chest pain) w/ HFrEF (negative prior stress test but never angiogram), and atrial fibrillation of unknown duration. Cath was nonobstructive. Patient is precontemplative to DCCV.  She was discharged on entresto 24/26bid, carvedilol, and apixaban.    In the ED HR 140s-170s BP 80/50s - 120s/90s. Pt received metoprolol 5mg IV x1, 1L LR bolus. HR persistently 130s-150s.  Lactate 3.7 with neg trops but GREY and transaminitis. Pt admitted to CCU for management of afib with RVR and acute decompensated heart failure.     In CCU, patient was transitioned from digoxin to amiodarone and diuresed with bumex 1mg po x2 with good response. Initially home entresto and BB was held for concern of cardiogenic shock (lact 3.7). On 7/6 lact improved and Entresto and Eliquis were restarted however patient had subsequent episaode of afib 's. On 7/7patient was taken for planned TROY with DCCV which has cancelled due to presence of LA appendage thrombus. Pt was then reloaded with digoxin, started on hydralazine, and lasix was d/c due to worsening GREY. On 7/8 pt was started on po dig and hydralazine was d/c. Entresto was increased to 7/9 and coreg dose was increased to her home dose on 7/10.       ASSESSMENT & PLAN:   48y female with PHMx: of HTN, HF (EF 41%), Afib on Eliquis and metoprolol who presents to the ED for dizziness who was found to be in AFIB with RVR and ADHF 2/2 med noncompliance. Admitted to CCU for close HD monitoring.     Neuro:  - No active issues  - A/Ox3  - No issues     Resp:  - No active issues    Cardiac:   #A fib with RVR   - ECG with afib with RVR without ischemic changes. trops neg.  - s/p amio and IV dig load  - CHADS-VAS 3  - TROY/DCCV on 7/7 showed LA thrombus  - Continue Apixaban   - start digoxin 125mcg and coreg increased to 6.125mg PO bid  - MAP goal>65  - EP following    #Acute on chronic systolic heart failure  - iso medication noncompliance  - NICM on [facility] on 5/5/2023  - TTE: Severe segmental left ventricular systolic dysfunction.The septal, apical,  inferior, anterior, and anteroseptal are akinetic. The remaining walls are hypokinetic. LVEF calculated using biplane Arreola's method is 27%.  Sev MR  - Cath 5/23 non obstructive  - Continue Entresto and coreg 6.125mg bid  - d/c standing Lasix 20mg on 7/7  - Restart home dose lasix 20mg once CRT normalizes  - Lact 3.7-->1.8    GI:  #Transaminitis iso ADHF  - Continue diet   - LFTs downtrending, asymptomatic   - Cont to trend LFTS with perfusion markers  - Bowel regimen - start senna    Renal:  #GREY likely prerenal   - holding lasix  - replete lytes as appropriate     ID:  - afebrile, wbc nl   - Cont to monitor off abx     Heme:  - Cont home Eliquis for A/C for afib   - cbc stable    Endo:  - Previous TSH wnl       For Follow-Up:  - Per EP, 1 month follow-up for outpatient TROY/DCCV  - LA Thrombus on TROY, continue Eliquis  - Trend LFTs CCU Transfer Note    Transfer from: CCU  Transfer to:  (  ) Medicine    (X ) Telemetry    (  ) RCU    (  ) Palliative    (  ) Stroke Unit    (  ) _______________  Accepting physician: Dr. Allen  Sign out to ACP :    MEDICATIONS:  STANDING MEDICATIONS  apixaban 5 milliGRAM(s) Oral every 12 hours  carvedilol 6.125 milliGRAM(s) Oral every 12 hours  chlorhexidine 2% Cloths 1 Application(s) Topical <User Schedule>  digoxin     Tablet 125 MICROGram(s) Oral daily  melatonin 3 milliGRAM(s) Oral at bedtime  sacubitril 49 mG/valsartan 51 mG 1 Tablet(s) Oral two times a day  senna 2 Tablet(s) Oral at bedtime    PRN MEDICATIONS  benzocaine/menthol Lozenge 1 Lozenge Oral four times a day PRN    VITAL SIGNS: Last 24 Hours  T(C): 36.7 (10 Jul 2023 08:00), Max: 36.9 (09 Jul 2023 16:00)  T(F): 98 (10 Jul 2023 08:00), Max: 98.4 (09 Jul 2023 16:00)  HR: 86 (10 Jul 2023 08:00) (81 - 121)  BP: 132/100 (10 Jul 2023 08:00) (111/83 - 150/94)  BP(mean): 112 (10 Jul 2023 08:00) (85 - 122)  RR: 20 (10 Jul 2023 08:00) (12 - 34)  SpO2: 100% (10 Jul 2023 08:00) (91% - 100%)    LABS:                        15.7   3.93  )-----------( 268      ( 10 Jul 2023 06:30 )             48.6     07-10    141  |  103  |  22  ----------------------------<  101<H>  4.9   |  26  |  1.18    Ca    9.5      10 Jul 2023 06:30  Phos  4.0     07-10  Mg     2.20     07-10    TPro  6.3  /  Alb  3.3  /  TBili  0.9  /  DBili  x   /  AST  53<H>  /  ALT  123<H>  /  AlkPhos  102  07-10    PT/INR - ( 09 Jul 2023 05:09 )   PT: 13.4 sec;   INR: 1.15 ratio           Urinalysis Basic - ( 10 Jul 2023 06:30 )    Color: x / Appearance: x / SG: x / pH: x  Gluc: 101 mg/dL / Ketone: x  / Bili: x / Urobili: x   Blood: x / Protein: x / Nitrite: x   Leuk Esterase: x / RBC: x / WBC x   Sq Epi: x / Non Sq Epi: x / Bacteria: x      CCU COURSE:  48y female with pmhx of HTN, CHF (EF 41%), Afib on Eliquis and metoprolol who presented to the ED for dizziness and found to be in AFIB with RVR. Patient states that her medication was changed last week from carvedilol to metoprolol (she was on maximum dose carvedilol but still not achieving rate control). She reports worsening dizziness and fatigue on this medication, reports medication noncompliance as well as 1 week of diarrhea.    Pt previously admitted to [facility] 5/8; at that time presented w/ 2 weeks of nausea, fatigue and generalized weakness, shortly after a vacation in [place] with recreational alcohol binging. She had been transferred over to [facility] for a diagnostic LHC in the setting of NSTEMI (without EKG changes or chest pain) w/ HFrEF (negative prior stress test but never angiogram), and atrial fibrillation of unknown duration. Cath was nonobstructive. Patient is precontemplative to DCCV.  She was discharged on entresto 24/26bid, carvedilol, and apixaban.    In the ED HR 140s-170s BP 80/50s - 120s/90s. Pt received metoprolol 5mg IV x1, 1L LR bolus. HR persistently 130s-150s.  Lactate 3.7 with neg trops but GREY and transaminitis. Pt admitted to CCU for management of afib with RVR and acute decompensated heart failure.     In CCU, patient was transitioned from digoxin to amiodarone and diuresed with bumex 1mg po x2 with good response. Initially home entresto and BB was held for concern of cardiogenic shock (lact 3.7). On 7/6 lact improved and Entresto and Eliquis were restarted however patient had subsequent episaode of afib 's. On 7/7patient was taken for planned TROY with DCCV which has cancelled due to presence of LA appendage thrombus. Pt was then reloaded with digoxin, started on hydralazine, and lasix was d/c due to worsening GREY. On 7/8 pt was started on po dig and hydralazine was d/c. Entresto was increased to 7/9 and coreg dose was increased to her home dose on 7/10. Pt was also started on Farxiga and dig was held due to elevated dig level.       ASSESSMENT & PLAN:   48y female with PHMx: of HTN, HF (EF 41%), Afib on Eliquis and metoprolol who presents to the ED for dizziness who was found to be in AFIB with RVR and ADHF 2/2 med noncompliance. Admitted to CCU for close HD monitoring.     Neuro:  - No active issues  - A/Ox3  - No issues     Resp:  - No active issues    Cardiac:   #A fib with RVR   - ECG with afib with RVR without ischemic changes. trops neg.  - s/p amio and IV dig load  - CHADS-VAS 3  - TROY/DCCV on 7/7 showed LA thrombus  - Continue Apixaban   - start digoxin 125mcg and coreg increased to 6.125mg PO bid  - Dig level on 7/10 2.4 > dig held and to be restarted every other day pending dig level tomorrow am  - MAP goal>65  - EP following    #Acute on chronic systolic heart failure (EF 27%)  - iso medication noncompliance  - NICM on [facility] on 5/5/2023  - TTE: Severe segmental left ventricular systolic dysfunction. The septal, apical,  inferior, anterior, and anteroseptal are akinetic. The remaining walls are hypokinetic. LVEF calculated using biplane Arreola's method is 27%.  Sev MR  - Cath 5/23 non obstructive  - Continue Entresto and coreg 6.125mg bid  - Starting farxiga 10mg qd  - d/c standing Lasix 20mg on 7/7  - Restart home dose lasix 20mg once CRT normalizes  - Lact 3.7-->1.8    GI:  #Transaminitis iso ADHF  - Continue diet   - LFTs downtrending, asymptomatic   - Cont to trend LFTS with perfusion markers  - Bowel regimen - start senna    Renal:  #GREY likely prerenal   - holding lasix  - replete lytes as appropriate     ID:  - afebrile, wbc nl     Heme:  - Cont home Eliquis for A/C for afib   - cbc stable    Endo:  - Previous TSH wnl     For Follow-Up:  - Dig held today, recheck AM dig level and if ok restarted every other day  - LA Thrombus on TROY, continue Eliquis  - Trend LFTs  - Per EP, 1 month follow-up for outpatient TROY/DCCV CCU Transfer Note    Transfer from: CCU  Transfer to:  (  ) Medicine    (X ) Telemetry    (  ) RCU    (  ) Palliative    (  ) Stroke Unit    (  ) _______________  Accepting physician: Dr. Allen  Sign out to ACP :    MEDICATIONS:  STANDING MEDICATIONS  apixaban 5 milliGRAM(s) Oral every 12 hours  carvedilol 6.125 milliGRAM(s) Oral every 12 hours  chlorhexidine 2% Cloths 1 Application(s) Topical <User Schedule>  digoxin     Tablet 125 MICROGram(s) Oral daily  melatonin 3 milliGRAM(s) Oral at bedtime  sacubitril 49 mG/valsartan 51 mG 1 Tablet(s) Oral two times a day  senna 2 Tablet(s) Oral at bedtime    PRN MEDICATIONS  benzocaine/menthol Lozenge 1 Lozenge Oral four times a day PRN    VITAL SIGNS: Last 24 Hours  T(C): 36.7 (10 Jul 2023 08:00), Max: 36.9 (09 Jul 2023 16:00)  T(F): 98 (10 Jul 2023 08:00), Max: 98.4 (09 Jul 2023 16:00)  HR: 86 (10 Jul 2023 08:00) (81 - 121)  BP: 132/100 (10 Jul 2023 08:00) (111/83 - 150/94)  BP(mean): 112 (10 Jul 2023 08:00) (85 - 122)  RR: 20 (10 Jul 2023 08:00) (12 - 34)  SpO2: 100% (10 Jul 2023 08:00) (91% - 100%)    LABS:                        15.7   3.93  )-----------( 268      ( 10 Jul 2023 06:30 )             48.6     07-10    141  |  103  |  22  ----------------------------<  101<H>  4.9   |  26  |  1.18    Ca    9.5      10 Jul 2023 06:30  Phos  4.0     07-10  Mg     2.20     07-10    TPro  6.3  /  Alb  3.3  /  TBili  0.9  /  DBili  x   /  AST  53<H>  /  ALT  123<H>  /  AlkPhos  102  07-10    PT/INR - ( 09 Jul 2023 05:09 )   PT: 13.4 sec;   INR: 1.15 ratio           Urinalysis Basic - ( 10 Jul 2023 06:30 )    Color: x / Appearance: x / SG: x / pH: x  Gluc: 101 mg/dL / Ketone: x  / Bili: x / Urobili: x   Blood: x / Protein: x / Nitrite: x   Leuk Esterase: x / RBC: x / WBC x   Sq Epi: x / Non Sq Epi: x / Bacteria: x      CCU COURSE:  48y female with pmhx of HTN, CHF (EF 41%), Afib on Eliquis and metoprolol who presented to the ED for dizziness and found to be in AFIB with RVR. Patient states that her medication was changed last week from carvedilol to metoprolol (she was on maximum dose carvedilol but still not achieving rate control). She reports worsening dizziness and fatigue on this medication, reports medication noncompliance as well as 1 week of diarrhea.    Pt previously admitted to [facility] 5/8; at that time presented w/ 2 weeks of nausea, fatigue and generalized weakness, shortly after a vacation in [place] with recreational alcohol binging. She had been transferred over to [facility] for a diagnostic LHC in the setting of NSTEMI (without EKG changes or chest pain) w/ HFrEF (negative prior stress test but never angiogram), and atrial fibrillation of unknown duration. Cath was nonobstructive. Patient is precontemplative to DCCV.  She was discharged on entresto 24/26bid, carvedilol, and apixaban.    In the ED HR 140s-170s BP 80/50s - 120s/90s. Pt received metoprolol 5mg IV x1, 1L LR bolus. HR persistently 130s-150s.  Lactate 3.7 with neg trops but GREY and transaminitis. Pt admitted to CCU for management of afib with RVR and acute decompensated heart failure.     In CCU, patient was transitioned from digoxin to amiodarone and diuresed with bumex 1mg po x2 with good response. Initially home entresto and BB was held for concern of cardiogenic shock (lact 3.7). On 7/6 lact improved and Entresto and Eliquis were restarted however patient had subsequent episaode of afib 's. On 7/7patient was taken for planned TROY with DCCV which has cancelled due to presence of LA appendage thrombus. Pt was then reloaded with digoxin, started on hydralazine, and lasix was d/c due to worsening GREY. On 7/8 pt was started on po dig and hydralazine was d/c. Entresto was increased to 7/9 and coreg dose was increased to her home dose on 7/10. Pt was also started on Farxiga and dig was held due to elevated dig level.       ASSESSMENT & PLAN:   48y female with PHMx: of HTN, HF (EF 41%), Afib on Eliquis and metoprolol who presents to the ED for dizziness who was found to be in AFIB with RVR and ADHF 2/2 med noncompliance. Admitted to CCU for close HD monitoring.     Neuro:  - No active issues  - A/Ox3  - No issues     Resp:  - No active issues    Cardiac:   #A fib with RVR   - ECG with afib with RVR without ischemic changes. trops neg.  - s/p amio and IV dig load  - CHADS-VAS 3  - TROY/DCCV on 7/7 showed LA thrombus  - Continue Apixaban   - start digoxin 125mcg and coreg increased to 6.125mg PO bid  - Dig level on 7/10 2.4 > dig held and to be restarted every other day pending dig level tomorrow am  - MAP goal>65  - EP following    #Acute on chronic systolic heart failure (EF 27%)  - iso medication noncompliance  - NICM on [facility] on 5/5/2023  - TTE: Severe segmental left ventricular systolic dysfunction. The septal, apical,  inferior, anterior, and anteroseptal are akinetic. The remaining walls are hypokinetic. LVEF calculated using biplane Arreola's method is 27%.  Sev MR  - Cath 5/23 non obstructive  - Continue Entresto and coreg 6.125mg bid  - Starting farxiga 10mg qd  - d/c standing Lasix 20mg on 7/7  - Lact 3.7-->1.8    GI:  #Transaminitis iso ADHF  - Continue diet   - LFTs downtrending, asymptomatic   - Cont to trend LFTS with perfusion markers  - Bowel regimen - start senna    Renal:  #GREY likely prerenal   - holding lasix  - replete lytes as appropriate     ID:  - afebrile, wbc nl     Heme:  - Cont home Eliquis for A/C for afib   - cbc stable    Endo:  - Previous TSH wnl     For Follow-Up:  - Dig held today, recheck AM dig level and if ok restarted every other day  - LA Thrombus on TROY, continue Eliquis  - Trend LFTs  - Per EP, 1 month follow-up for outpatient TROY/DCCV CCU Transfer Note    Transfer from: CCU  Transfer to:  (  ) Medicine    (X ) Telemetry    (  ) RCU    (  ) Palliative    (  ) Stroke Unit    (  ) _______________  Accepting physician: Dr. Allen  Sign out to ACP : NP E    MEDICATIONS:  STANDING MEDICATIONS  apixaban 5 milliGRAM(s) Oral every 12 hours  carvedilol 6.125 milliGRAM(s) Oral every 12 hours  chlorhexidine 2% Cloths 1 Application(s) Topical <User Schedule>  digoxin     Tablet 125 MICROGram(s) Oral daily  melatonin 3 milliGRAM(s) Oral at bedtime  sacubitril 49 mG/valsartan 51 mG 1 Tablet(s) Oral two times a day  senna 2 Tablet(s) Oral at bedtime    PRN MEDICATIONS  benzocaine/menthol Lozenge 1 Lozenge Oral four times a day PRN    VITAL SIGNS: Last 24 Hours  T(C): 36.7 (10 Jul 2023 08:00), Max: 36.9 (09 Jul 2023 16:00)  T(F): 98 (10 Jul 2023 08:00), Max: 98.4 (09 Jul 2023 16:00)  HR: 86 (10 Jul 2023 08:00) (81 - 121)  BP: 132/100 (10 Jul 2023 08:00) (111/83 - 150/94)  BP(mean): 112 (10 Jul 2023 08:00) (85 - 122)  RR: 20 (10 Jul 2023 08:00) (12 - 34)  SpO2: 100% (10 Jul 2023 08:00) (91% - 100%)    LABS:                        15.7   3.93  )-----------( 268      ( 10 Jul 2023 06:30 )             48.6     07-10    141  |  103  |  22  ----------------------------<  101<H>  4.9   |  26  |  1.18    Ca    9.5      10 Jul 2023 06:30  Phos  4.0     07-10  Mg     2.20     07-10    TPro  6.3  /  Alb  3.3  /  TBili  0.9  /  DBili  x   /  AST  53<H>  /  ALT  123<H>  /  AlkPhos  102  07-10    PT/INR - ( 09 Jul 2023 05:09 )   PT: 13.4 sec;   INR: 1.15 ratio           Urinalysis Basic - ( 10 Jul 2023 06:30 )    Color: x / Appearance: x / SG: x / pH: x  Gluc: 101 mg/dL / Ketone: x  / Bili: x / Urobili: x   Blood: x / Protein: x / Nitrite: x   Leuk Esterase: x / RBC: x / WBC x   Sq Epi: x / Non Sq Epi: x / Bacteria: x      CCU COURSE:  48y female with pmhx of HTN, CHF (EF 41%), Afib on Eliquis and metoprolol who presented to the ED for dizziness and found to be in AFIB with RVR. Patient states that her medication was changed last week from carvedilol to metoprolol (she was on maximum dose carvedilol but still not achieving rate control). She reports worsening dizziness and fatigue on this medication, reports medication noncompliance as well as 1 week of diarrhea.    Pt previously admitted to [facility] 5/8; at that time presented w/ 2 weeks of nausea, fatigue and generalized weakness, shortly after a vacation in [place] with recreational alcohol binging. She had been transferred over to [facility] for a diagnostic LHC in the setting of NSTEMI (without EKG changes or chest pain) w/ HFrEF (negative prior stress test but never angiogram), and atrial fibrillation of unknown duration. Cath was nonobstructive. Patient is precontemplative to DCCV.  She was discharged on entresto 24/26bid, carvedilol, and apixaban.    In the ED HR 140s-170s BP 80/50s - 120s/90s. Pt received metoprolol 5mg IV x1, 1L LR bolus. HR persistently 130s-150s.  Lactate 3.7 with neg trops but GREY and transaminitis. Pt admitted to CCU for management of afib with RVR and acute decompensated heart failure.     In CCU, patient was transitioned from digoxin to amiodarone and diuresed with bumex 1mg po x2 with good response. Initially home entresto and BB was held for concern of cardiogenic shock (lact 3.7). On 7/6 lact improved and Entresto and Eliquis were restarted however patient had subsequent episaode of afib 's. On 7/7patient was taken for planned TROY with DCCV which has cancelled due to presence of LA appendage thrombus. Pt was then reloaded with digoxin, started on hydralazine, and lasix was d/c due to worsening GREY. On 7/8 pt was started on po dig and hydralazine was d/c. Entresto was increased to 7/9 and coreg dose was increased to her home dose on 7/10. Pt was also started on Farxiga and dig was held due to elevated dig level.       ASSESSMENT & PLAN:   48y female with PHMx: of HTN, HF (EF 41%), Afib on Eliquis and metoprolol who presents to the ED for dizziness who was found to be in AFIB with RVR and ADHF 2/2 med noncompliance. Admitted to CCU for close HD monitoring.     Neuro:  - No active issues  - A/Ox3  - No issues     Resp:  - No active issues    Cardiac:   #A fib with RVR   - ECG with afib with RVR without ischemic changes. trops neg.  - s/p amio and IV dig load  - CHADS-VAS 3  - TROY/DCCV on 7/7 showed LA thrombus  - Continue Apixaban   - start digoxin 125mcg and coreg increased to 6.125mg PO bid  - Dig level on 7/10 2.4 > dig held and to be restarted every other day pending dig level tomorrow am  - MAP goal>65  - EP following    #Acute on chronic systolic heart failure (EF 27%)  - iso medication noncompliance  - NICM on [facility] on 5/5/2023  - TTE: Severe segmental left ventricular systolic dysfunction. The septal, apical,  inferior, anterior, and anteroseptal are akinetic. The remaining walls are hypokinetic. LVEF calculated using biplane Arreola's method is 27%.  Sev MR  - Cath 5/23 non obstructive  - Continue Entresto and coreg 6.125mg bid  - Starting farxiga 10mg qd  - d/c standing Lasix 20mg on 7/7  - Lact 3.7-->1.8    GI:  #Transaminitis iso ADHF  - Continue diet   - LFTs downtrending, asymptomatic   - Cont to trend LFTS with perfusion markers  - Bowel regimen - start senna    Renal:  #GREY likely prerenal   - holding lasix  - replete lytes as appropriate     ID:  - afebrile, wbc nl     Heme:  - Cont home Eliquis for A/C for afib   - cbc stable    Endo:  - Previous TSH wnl     For Follow-Up:  - Dig held today, recheck AM dig level and if ok restarted every other day  - LA Thrombus on TROY, continue Eliquis  - Trend LFTs  - Per EP, 1 month follow-up for outpatient TROY/DCCV CCU Transfer Note    Transfer from: CCU  Transfer to:  (  ) Medicine    (X ) Telemetry    (  ) RCU    (  ) Palliative    (  ) Stroke Unit    (  ) _______________  Accepting physician: Dr. Allen  Sign out to ACP : NP Rachellelyn    MEDICATIONS:  STANDING MEDICATIONS  apixaban 5 milliGRAM(s) Oral every 12 hours  carvedilol 6.125 milliGRAM(s) Oral every 12 hours  chlorhexidine 2% Cloths 1 Application(s) Topical <User Schedule>  digoxin     Tablet 125 MICROGram(s) Oral daily  melatonin 3 milliGRAM(s) Oral at bedtime  sacubitril 49 mG/valsartan 51 mG 1 Tablet(s) Oral two times a day  senna 2 Tablet(s) Oral at bedtime    PRN MEDICATIONS  benzocaine/menthol Lozenge 1 Lozenge Oral four times a day PRN    VITAL SIGNS: Last 24 Hours  T(C): 36.7 (10 Jul 2023 08:00), Max: 36.9 (09 Jul 2023 16:00)  T(F): 98 (10 Jul 2023 08:00), Max: 98.4 (09 Jul 2023 16:00)  HR: 86 (10 Jul 2023 08:00) (81 - 121)  BP: 132/100 (10 Jul 2023 08:00) (111/83 - 150/94)  BP(mean): 112 (10 Jul 2023 08:00) (85 - 122)  RR: 20 (10 Jul 2023 08:00) (12 - 34)  SpO2: 100% (10 Jul 2023 08:00) (91% - 100%)    LABS:                        15.7   3.93  )-----------( 268      ( 10 Jul 2023 06:30 )             48.6     07-10    141  |  103  |  22  ----------------------------<  101<H>  4.9   |  26  |  1.18    Ca    9.5      10 Jul 2023 06:30  Phos  4.0     07-10  Mg     2.20     07-10    TPro  6.3  /  Alb  3.3  /  TBili  0.9  /  DBili  x   /  AST  53<H>  /  ALT  123<H>  /  AlkPhos  102  07-10    PT/INR - ( 09 Jul 2023 05:09 )   PT: 13.4 sec;   INR: 1.15 ratio           Urinalysis Basic - ( 10 Jul 2023 06:30 )    Color: x / Appearance: x / SG: x / pH: x  Gluc: 101 mg/dL / Ketone: x  / Bili: x / Urobili: x   Blood: x / Protein: x / Nitrite: x   Leuk Esterase: x / RBC: x / WBC x   Sq Epi: x / Non Sq Epi: x / Bacteria: x      CCU COURSE:  48y female with pmhx of HTN, CHF (EF 41%), Afib on Eliquis and metoprolol who presented to the ED for dizziness and found to be in AFIB with RVR. Patient states that her medication was changed last week from carvedilol to metoprolol (she was on maximum dose carvedilol but still not achieving rate control). She reports worsening dizziness and fatigue on this medication, reports medication noncompliance as well as 1 week of diarrhea.    Pt previously admitted to [facility] 5/8; at that time presented w/ 2 weeks of nausea, fatigue and generalized weakness, shortly after a vacation in [place] with recreational alcohol binging. She had been transferred over to [facility] for a diagnostic LHC in the setting of NSTEMI (without EKG changes or chest pain) w/ HFrEF (negative prior stress test but never angiogram), and atrial fibrillation of unknown duration. Cath was nonobstructive. Patient is precontemplative to DCCV.  She was discharged on entresto 24/26bid, carvedilol, and apixaban.    In the ED HR 140s-170s BP 80/50s - 120s/90s. Pt received metoprolol 5mg IV x1, 1L LR bolus. HR persistently 130s-150s.  Lactate 3.7 with neg trops but GREY and transaminitis. Pt admitted to CCU for management of afib with RVR and acute decompensated heart failure.     In CCU, patient was transitioned from digoxin to amiodarone and diuresed with bumex 1mg po x2 with good response. Initially home entresto and BB was held for concern of cardiogenic shock (lact 3.7). On 7/6 lact improved and Entresto and Eliquis were restarted however patient had subsequent episaode of afib 's. On 7/7patient was taken for planned TROY with DCCV which has cancelled due to presence of LA appendage thrombus. Pt was then reloaded with digoxin, started on hydralazine, and lasix was d/c due to worsening GREY. On 7/8 pt was started on po dig and hydralazine was d/c. Entresto was increased to 7/9 and coreg dose was increased to her home dose on 7/10. Pt was also started on Farxiga and dig was held due to elevated dig level.       ASSESSMENT & PLAN:   48y female with PHMx: of HTN, HF (EF 41%), Afib on Eliquis and metoprolol who presents to the ED for dizziness who was found to be in AFIB with RVR and ADHF 2/2 med noncompliance. Admitted to CCU for close HD monitoring.     Neuro:  - No active issues  - A/Ox3  - No issues     Resp:  - No active issues    Cardiac:   #A fib with RVR   - ECG with afib with RVR without ischemic changes. trops neg.  - s/p amio and IV dig load  - CHADS-VAS 3  - TROY/DCCV on 7/7 showed LA thrombus  - Continue Apixaban   - start digoxin 125mcg and coreg increased to 6.125mg PO bid  - Dig level on 7/10 2.4 > dig held and to be restarted every other day pending dig level tomorrow am  - MAP goal>65  - EP following    #Acute on chronic systolic heart failure (EF 27%)  - iso medication noncompliance  - NICM on [facility] on 5/5/2023  - TTE: Severe segmental left ventricular systolic dysfunction. The septal, apical,  inferior, anterior, and anteroseptal are akinetic. The remaining walls are hypokinetic. LVEF calculated using biplane Arreola's method is 27%.  Sev MR  - Cath 5/23 non obstructive  - Continue Entresto and coreg 6.125mg bid  - Starting farxiga 10mg qd  - d/c standing Lasix 20mg on 7/7  - Lact 3.7-->1.8    GI:  #Transaminitis iso ADHF  - Continue diet   - LFTs downtrending, asymptomatic   - Cont to trend LFTS with perfusion markers  - Bowel regimen - start senna    Renal:  #GREY likely prerenal   - holding lasix  - replete lytes as appropriate     ID:  - afebrile, wbc nl     Heme:  - Cont home Eliquis for A/C for afib   - cbc stable    Endo:  - Previous TSH wnl     For Follow-Up:  - Dig held today, recheck AM dig level and if ok restarted every other day  - LA Thrombus on TROY, continue Eliquis  - Trend LFTs  - Per EP, 1 month follow-up for outpatient TROY/DCCV

## 2023-07-10 NOTE — PROGRESS NOTE ADULT - SUBJECTIVE AND OBJECTIVE BOX
Patient is a 48y old  Female who presents with a chief complaint of afib with rvr (2023 07:41)    HPI:  48y female with pmhx of HTN, CHF (EF 41%), Afib on Eliquis and metoprolol who presents to the ED for dizziness who was found to be in AFIB with RVR. Patient states that her medication was changed last week from carvedilol to metoprolol (she was on maximum dose carvedilol but still not achieving rate control). She reports worsening dizziness and fatigue on this medication, and through the holiday weekend she has been noncompliant with her medications. ROS + 1 week of diarrhea  Currently, she is denying any chest pain, shortness of breath (although she is sometimes speaking in abbreviated phrases and appears winded). +cough when supine that is worsened. Denies any fevers, chills, night sweats, leg swelling.    Pt previously admitted to [facility] ; at that time presented w/ 2 weeks of nausea, fatigue and generalized weakness, shortly after a vacation in [place] with recreational alcohol binging. She had been transferred over to [facility] for a diagnostic LHC in the setting of NSTEMI (without EKG changes or chest pain) w/ HFrEF (negative prior stress test but never angiogram), and atrial fibrillation of unknown duration.  Cath was nonobstructive. Patient is precontemplative to DCC.  She was discharged on entresto bid, carvedilol, and apixaban.    In the ED HR 140s-170s BP 80/50s - 120s/90s. Pt received metoprolol 5mg IV x1, 1L LR bolus. HR persistently 130s-150s.   Lactate 3.7 with neg trops but GREY and transaminitis.        (2023 19:21)       INTERVAL HPI/OVERNIGHT EVENTS:   No overnight events   Afebrile, hemodynamically stable     Subjective:    ICU Vital Signs Last 24 Hrs  T(C): 36.7 (10 Jul 2023 04:00), Max: 36.9 (2023 16:00)  T(F): 98.1 (10 Jul 2023 04:00), Max: 98.4 (2023 16:00)  HR: 112 (10 Jul 2023 06:00) (81 - 121)  BP: 139/95 (10 Jul 2023 06:00) (111/83 - 161/73)  BP(mean): 105 (10 Jul 2023 06:00) (85 - 122)  ABP: --  ABP(mean): --  RR: 21 (10 Jul 2023 06:00) (12 - 34)  SpO2: 93% (10 Jul 2023 06:00) (91% - 100%)    O2 Parameters below as of 10 Jul 2023 06:00  Patient On (Oxygen Delivery Method): room air          I&O's Summary    2023 07:01  -  10 Jul 2023 07:00  --------------------------------------------------------  IN: 300 mL / OUT: 750 mL / NET: -450 mL          Daily     Daily Weight in k.9 (10 Jul 2023 06:00)    Adult Advanced Hemodynamics Last 24 Hrs  CVP(mm Hg): --  CVP(cm H2O): --  CO: --  CI: --  PA: --  PA(mean): --  PCWP: --  SVR: --  SVRI: --  PVR: --  PVRI: --    EKG/Telemetry Events:    MEDICATIONS  (STANDING):  apixaban 5 milliGRAM(s) Oral every 12 hours  carvedilol 3.125 milliGRAM(s) Oral every 12 hours  chlorhexidine 2% Cloths 1 Application(s) Topical <User Schedule>  digoxin     Tablet 125 MICROGram(s) Oral daily  melatonin 3 milliGRAM(s) Oral at bedtime  sacubitril 49 mG/valsartan 51 mG 1 Tablet(s) Oral two times a day  senna 2 Tablet(s) Oral at bedtime    MEDICATIONS  (PRN):  benzocaine/menthol Lozenge 1 Lozenge Oral four times a day PRN Sore Throat      PHYSICAL EXAM:  GENERAL:   HEAD:  Atraumatic, Normocephalic  EYES: EOMI, PERRLA, conjunctiva and sclera clear  NECK: Supple, No JVD, Normal thyroid, no enlarged nodes  NERVOUS SYSTEM:  Alert & Awake.   CHEST/LUNG: B/L good air entry; No rales, rhonchi, or wheezing  HEART: S1S2 normal, no S3, Regular rate and rhythm; No murmurs  ABDOMEN: Soft, Nontender, Nondistended; Bowel sounds present  EXTREMITIES:  2+ Peripheral Pulses, No clubbing, cyanosis, or edema  LYMPH: No lymphadenopathy noted  SKIN: No rashes or lesions    LABS:                        15.7   3.93  )-----------( 268      ( 10 Jul 2023 06:30 )             48.6     07-    138  |  104  |  28<H>  ----------------------------<  97  4.1   |  25  |  1.20    Ca    9.1      2023 05:09  Phos  3.6     07-  Mg     2.30     -    TPro  6.3  /  Alb  3.3  /  TBili  1.0  /  DBili  x   /  AST  58<H>  /  ALT  142<H>  /  AlkPhos  103  07-09    LIVER FUNCTIONS - ( 2023 05:09 )  Alb: 3.3 g/dL / Pro: 6.3 g/dL / ALK PHOS: 103 U/L / ALT: 142 U/L / AST: 58 U/L / GGT: x           PT/INR - ( 2023 05:09 )   PT: 13.4 sec;   INR: 1.15 ratio           CAPILLARY BLOOD GLUCOSE                Urinalysis Basic - ( 2023 05:09 )    Color: x / Appearance: x / SG: x / pH: x  Gluc: 97 mg/dL / Ketone: x  / Bili: x / Urobili: x   Blood: x / Protein: x / Nitrite: x   Leuk Esterase: x / RBC: x / WBC x   Sq Epi: x / Non Sq Epi: x / Bacteria: x          RADIOLOGY & ADDITIONAL TESTS:  CXR:        Care Discussed with Consultants/Other Providers [ x] YES  [ ] NO           INTERVAL HPI/OVERNIGHT EVENTS:   No overnight events   Afebrile, hemodynamically stable     Subjective:    ICU Vital Signs Last 24 Hrs  T(C): 36.7 (10 Jul 2023 04:00), Max: 36.9 (2023 16:00)  T(F): 98.1 (10 Jul 2023 04:00), Max: 98.4 (2023 16:00)  HR: 112 (10 Jul 2023 06:00) (81 - 121)  BP: 139/95 (10 Jul 2023 06:00) (111/83 - 161/73)  BP(mean): 105 (10 Jul 2023 06:00) (85 - 122)  ABP: --  ABP(mean): --  RR: 21 (10 Jul 2023 06:00) (12 - 34)  SpO2: 93% (10 Jul 2023 06:00) (91% - 100%)    O2 Parameters below as of 10 Jul 2023 06:00  Patient On (Oxygen Delivery Method): room air      I&O's Summary    2023 07:01  -  10 Jul 2023 07:00  --------------------------------------------------------  IN: 300 mL / OUT: 750 mL / NET: -450 mL      Daily     Daily Weight in k.9 (10 Jul 2023 06:00)    EKG/Telemetry Events:    MEDICATIONS  (STANDING):  apixaban 5 milliGRAM(s) Oral every 12 hours  carvedilol 3.125 milliGRAM(s) Oral every 12 hours  chlorhexidine 2% Cloths 1 Application(s) Topical <User Schedule>  digoxin     Tablet 125 MICROGram(s) Oral daily  melatonin 3 milliGRAM(s) Oral at bedtime  sacubitril 49 mG/valsartan 51 mG 1 Tablet(s) Oral two times a day  senna 2 Tablet(s) Oral at bedtime    MEDICATIONS  (PRN):  benzocaine/menthol Lozenge 1 Lozenge Oral four times a day PRN Sore Throat      PHYSICAL EXAM:      LABS:                        15.7   3.93  )-----------( 268      ( 10 Jul 2023 06:30 )             48.6     07-    138  |  104  |  28<H>  ----------------------------<  97  4.1   |  25  |  1.20    Ca    9.1      2023 05:09  Phos  3.6     07-  Mg     2.30     07-    TPro  6.3  /  Alb  3.3  /  TBili  1.0  /  DBili  x   /  AST  58<H>  /  ALT  142<H>  /  AlkPhos  103  07-09    LIVER FUNCTIONS - ( 2023 05:09 )  Alb: 3.3 g/dL / Pro: 6.3 g/dL / ALK PHOS: 103 U/L / ALT: 142 U/L / AST: 58 U/L / GGT: x           PT/INR - ( 2023 05:09 )   PT: 13.4 sec;   INR: 1.15 ratio           CAPILLARY BLOOD GLUCOSE      Urinalysis Basic - ( 2023 05:09 )    Color: x / Appearance: x / SG: x / pH: x  Gluc: 97 mg/dL / Ketone: x  / Bili: x / Urobili: x   Blood: x / Protein: x / Nitrite: x   Leuk Esterase: x / RBC: x / WBC x   Sq Epi: x / Non Sq Epi: x / Bacteria: x          RADIOLOGY & ADDITIONAL TESTS:  CXR:        Care Discussed with Consultants/Other Providers [ x] YES  [ ] NO         INTERVAL HPI/OVERNIGHT EVENTS:   - Patient assessed at bedside this AM. Denies any pain or discomfort. Noted to be supine in bed eating breakfast.   - No acute events overnight.     Subjective:    ICU Vital Signs Last 24 Hrs  T(C): 36.7 (10 Jul 2023 04:00), Max: 36.9 (2023 16:00)  T(F): 98.1 (10 Jul 2023 04:00), Max: 98.4 (2023 16:00)  HR: 112 (10 Jul 2023 06:00) (81 - 121)  BP: 139/95 (10 Jul 2023 06:00) (111/83 - 161/73)  BP(mean): 105 (10 Jul 2023 06:00) (85 - 122)  ABP: --  ABP(mean): --  RR: 21 (10 Jul 2023 06:00) (12 - 34)  SpO2: 93% (10 Jul 2023 06:00) (91% - 100%)    O2 Parameters below as of 10 Jul 2023 06:00  Patient On (Oxygen Delivery Method): room air      I&O's Summary    2023 07:01  -  10 Jul 2023 07:00  --------------------------------------------------------  IN: 300 mL / OUT: 750 mL / NET: -450 mL      Daily     Daily Weight in k.9 (10 Jul 2023 06:00)    EKG/Telemetry Events:    MEDICATIONS  (STANDING):  apixaban 5 milliGRAM(s) Oral every 12 hours  carvedilol 3.125 milliGRAM(s) Oral every 12 hours  chlorhexidine 2% Cloths 1 Application(s) Topical <User Schedule>  digoxin     Tablet 125 MICROGram(s) Oral daily  melatonin 3 milliGRAM(s) Oral at bedtime  sacubitril 49 mG/valsartan 51 mG 1 Tablet(s) Oral two times a day  senna 2 Tablet(s) Oral at bedtime    MEDICATIONS  (PRN):  benzocaine/menthol Lozenge 1 Lozenge Oral four times a day PRN Sore Throat      PHYSICAL EXAM:  GENERAL: NAD, well-groomed, well-developed  HEAD:  Atraumatic, Normocephalic  EYES: EOMI, PERRL, conjunctiva and sclera clear  ENMT: No oropharyngeal exudates, erythema or lesions,  Moist mucous membranes  NECK: Supple, no cervical lymphadenopathy, no JVD  NERVOUS SYSTEM: Alert & Oriented X3, CN II-XII intact, Moves all extremities  ; Upper extremities  5/5; Lower extremities 5/5, full sensation to light touch   CHEST/LUNG:   Breath sounds bilaterally without crackles, rhonchi, wheezes, rubs.   HEART: Cardiac monitor rate controlled afib with PVCs; S1/S2 without murmurs.  ABDOMEN: Soft, Nontender, Nondistended; Bowel sounds present, Bladder non distended, non palpable.  EXTREMITIES: Pulses palpable radial pulses 2+ bilat, DP/PT 1+/1+ bilat, without clubbing, cyanosis. Digits warm to touch with good cap refill <3 secs.  SKIN: Warm, dry, intact, normal color, no rash or abnormal lesions, without palpable nodes    LABS:                        15.7   3.93  )-----------( 268      ( 10 Jul 2023 06:30 )             48.6     07-09    138  |  104  |  28<H>  ----------------------------<  97  4.1   |  25  |  1.20    Ca    9.1      2023 05:09  Phos  3.6     07-  Mg     2.30     07-    TPro  6.3  /  Alb  3.3  /  TBili  1.0  /  DBili  x   /  AST  58<H>  /  ALT  142<H>  /  AlkPhos  103  07-09    LIVER FUNCTIONS - ( 2023 05:09 )  Alb: 3.3 g/dL / Pro: 6.3 g/dL / ALK PHOS: 103 U/L / ALT: 142 U/L / AST: 58 U/L / GGT: x           PT/INR - ( 2023 05:09 )   PT: 13.4 sec;   INR: 1.15 ratio      CAPILLARY BLOOD GLUCOSE    Urinalysis Basic - ( 2023 05:09 )    Color: x / Appearance: x / SG: x / pH: x  Gluc: 97 mg/dL / Ketone: x  / Bili: x / Urobili: x   Blood: x / Protein: x / Nitrite: x   Leuk Esterase: x / RBC: x / WBC x   Sq Epi: x / Non Sq Epi: x / Bacteria: x        Care Discussed with Consultants/Other Providers [x] YES  [ ] NO         INTERVAL HPI/OVERNIGHT EVENTS:   - Patient assessed at bedside this AM. Denies any pain or discomfort. Noted to be supine in bed eating breakfast.   - No acute events overnight.     Subjective:    ICU Vital Signs Last 24 Hrs  T(C): 36.7 (10 Jul 2023 04:00), Max: 36.9 (2023 16:00)  T(F): 98.1 (10 Jul 2023 04:00), Max: 98.4 (2023 16:00)  HR: 112 (10 Jul 2023 06:00) (81 - 121)  BP: 139/95 (10 Jul 2023 06:00) (111/83 - 161/73)  BP(mean): 105 (10 Jul 2023 06:00) (85 - 122)  ABP: --  ABP(mean): --  RR: 21 (10 Jul 2023 06:00) (12 - 34)  SpO2: 93% (10 Jul 2023 06:00) (91% - 100%)    O2 Parameters below as of 10 Jul 2023 06:00  Patient On (Oxygen Delivery Method): room air      I&O's Summary    2023 07:01  -  10 Jul 2023 07:00  --------------------------------------------------------  IN: 300 mL / OUT: 750 mL / NET: -450 mL      Daily     Daily Weight in k.9 (10 Jul 2023 06:00)    EKG/Telemetry Events:    MEDICATIONS  (STANDING):  apixaban 5 milliGRAM(s) Oral every 12 hours  carvedilol 3.125 milliGRAM(s) Oral every 12 hours  chlorhexidine 2% Cloths 1 Application(s) Topical <User Schedule>  digoxin     Tablet 125 MICROGram(s) Oral daily  melatonin 3 milliGRAM(s) Oral at bedtime  sacubitril 49 mG/valsartan 51 mG 1 Tablet(s) Oral two times a day  senna 2 Tablet(s) Oral at bedtime    MEDICATIONS  (PRN):  benzocaine/menthol Lozenge 1 Lozenge Oral four times a day PRN Sore Throat      PHYSICAL EXAM:  GENERAL: NAD, well-groomed, well-developed  HEAD:  Atraumatic, Normocephalic  EYES: EOMI, PERRL, conjunctiva and sclera clear  ENMT: No oropharyngeal exudates, erythema or lesions,  Moist mucous membranes  NECK: Supple, no cervical lymphadenopathy, no JVD  NERVOUS SYSTEM: Alert & Oriented X3, CN II-XII intact, Moves all extremities  ; Upper extremities  5/5; Lower extremities 5/5, full sensation to light touch   CHEST/LUNG: Breath sounds bilaterally without crackles, rhonchi, wheezes, rubs.   HEART: Cardiac monitor rate controlled afib with PVCs; S1/S2. No JVD.  ABDOMEN: Soft, Nontender, Nondistended; Bowel sounds present, Bladder non distended, non palpable.  EXTREMITIES: Pulses palpable radial pulses 2+ bilat, DP/PT 1+/1+ bilat, without clubbing, cyanosis. Digits warm to touch with good cap refill <3 secs.  SKIN: Warm, dry, intact, normal color, no rash or abnormal lesions, without palpable nodes    LABS:                        15.7   3.93  )-----------( 268      ( 10 Jul 2023 06:30 )             48.6     07-09    138  |  104  |  28<H>  ----------------------------<  97  4.1   |  25  |  1.20    Ca    9.1      2023 05:09  Phos  3.6     07-  Mg     2.30     07-    TPro  6.3  /  Alb  3.3  /  TBili  1.0  /  DBili  x   /  AST  58<H>  /  ALT  142<H>  /  AlkPhos  103  07-09    LIVER FUNCTIONS - ( 2023 05:09 )  Alb: 3.3 g/dL / Pro: 6.3 g/dL / ALK PHOS: 103 U/L / ALT: 142 U/L / AST: 58 U/L / GGT: x           PT/INR - ( 2023 05:09 )   PT: 13.4 sec;   INR: 1.15 ratio      CAPILLARY BLOOD GLUCOSE    Urinalysis Basic - ( 2023 05:09 )    Color: x / Appearance: x / SG: x / pH: x  Gluc: 97 mg/dL / Ketone: x  / Bili: x / Urobili: x   Blood: x / Protein: x / Nitrite: x   Leuk Esterase: x / RBC: x / WBC x   Sq Epi: x / Non Sq Epi: x / Bacteria: x        Care Discussed with Consultants/Other Providers [x] YES  [ ] NO         INTERVAL HPI/OVERNIGHT EVENTS:   - Patient assessed at bedside this AM. Denies any pain or discomfort. Noted to be supine in bed eating breakfast.   - No acute events overnight.     Subjective:    ICU Vital Signs Last 24 Hrs  T(C): 36.7 (10 Jul 2023 04:00), Max: 36.9 (2023 16:00)  T(F): 98.1 (10 Jul 2023 04:00), Max: 98.4 (2023 16:00)  HR: 112 (10 Jul 2023 06:00) (81 - 121)  BP: 139/95 (10 Jul 2023 06:00) (111/83 - 161/73)  BP(mean): 105 (10 Jul 2023 06:00) (85 - 122)  ABP: --  ABP(mean): --  RR: 21 (10 Jul 2023 06:00) (12 - 34)  SpO2: 93% (10 Jul 2023 06:00) (91% - 100%)    O2 Parameters below as of 10 Jul 2023 06:00  Patient On (Oxygen Delivery Method): room air      I&O's Summary    2023 07:01  -  10 Jul 2023 07:00  --------------------------------------------------------  IN: 300 mL / OUT: 750 mL / NET: -450 mL      Daily     Daily Weight in k.9 (10 Jul 2023 06:00)    EKG/Telemetry Events:  Afib with PVC's     MEDICATIONS  (STANDING):  apixaban 5 milliGRAM(s) Oral every 12 hours  carvedilol 3.125 milliGRAM(s) Oral every 12 hours  chlorhexidine 2% Cloths 1 Application(s) Topical <User Schedule>  digoxin     Tablet 125 MICROGram(s) Oral daily  melatonin 3 milliGRAM(s) Oral at bedtime  sacubitril 49 mG/valsartan 51 mG 1 Tablet(s) Oral two times a day  senna 2 Tablet(s) Oral at bedtime    MEDICATIONS  (PRN):  benzocaine/menthol Lozenge 1 Lozenge Oral four times a day PRN Sore Throat      PHYSICAL EXAM:  GENERAL: NAD, well-groomed, well-developed  HEAD:  Atraumatic, Normocephalic  EYES: EOMI, PERRL, conjunctiva and sclera clear  ENMT: No oropharyngeal exudates, erythema or lesions,  Moist mucous membranes  NECK: Supple, no cervical lymphadenopathy, no JVD  NERVOUS SYSTEM: Alert & Oriented X3, CN II-XII intact, Moves all extremities  ; Upper extremities  5/5; Lower extremities 5/5, full sensation to light touch   CHEST/LUNG: Breath sounds bilaterally without crackles, rhonchi, wheezes, rubs.   HEART: Cardiac monitor rate controlled afib with PVCs; S1/S2. No JVD.  ABDOMEN: Soft, Nontender, Nondistended; Bowel sounds present, Bladder non distended, non palpable.  EXTREMITIES: Pulses palpable radial pulses 2+ bilat, DP/PT 1+/1+ bilat, without clubbing, cyanosis. Digits warm to touch with good cap refill <3 secs.  SKIN: Warm, dry, intact, normal color, no rash or abnormal lesions, without palpable nodes    LABS:                        15.7   3.93  )-----------( 268      ( 10 Jul 2023 06:30 )             48.6     07-09    138  |  104  |  28<H>  ----------------------------<  97  4.1   |  25  |  1.20    Ca    9.1      2023 05:09  Phos  3.6     07-09  Mg     2.30     07-09    TPro  6.3  /  Alb  3.3  /  TBili  1.0  /  DBili  x   /  AST  58<H>  /  ALT  142<H>  /  AlkPhos  103  07-09    LIVER FUNCTIONS - ( 2023 05:09 )  Alb: 3.3 g/dL / Pro: 6.3 g/dL / ALK PHOS: 103 U/L / ALT: 142 U/L / AST: 58 U/L / GGT: x           PT/INR - ( 2023 05:09 )   PT: 13.4 sec;   INR: 1.15 ratio      CAPILLARY BLOOD GLUCOSE    Urinalysis Basic - ( 2023 05:09 )    Color: x / Appearance: x / SG: x / pH: x  Gluc: 97 mg/dL / Ketone: x  / Bili: x / Urobili: x   Blood: x / Protein: x / Nitrite: x   Leuk Esterase: x / RBC: x / WBC x   Sq Epi: x / Non Sq Epi: x / Bacteria: x      Care Discussed with Consultants/Other Providers [x] YES  [ ] NO

## 2023-07-10 NOTE — PROGRESS NOTE ADULT - ASSESSMENT
48y female with PHMx: of HTN, HF (EF 41%), Afib on Eliquis and metoprolol who presents to the ED for dizziness who was found to be in AFIB with RVR and ADHF 2/2 med noncompliance. Admitted to CCU for close HD monitoring.     Neuro:  no issues   - A/Ox3  - No issues     Resp:  - No active issues      Cardiac:   #A fib with RVR  - ECG  with afib with RVR without ischemic changes. trops neg.  - In setting of med noncompliance   - CHADS-VAS 3  - Previous TSH wnl   - TROY/DCCV on 7/7 showed LA thrombus  - change amiodarone to digoxin IV load  - Continue Apixaban   - start digoxin 125mcg and coreg 3.25mg Po bid  - Increased Coreg to 6.25 mg  - MAP goal>65  - EP following    #Acute on chronic systolic heart failure   - Likely tachy induced   - Euvolemic on exam    - NICM on [facility] on 5/5/2023  - TTE: Severe segmental left ventricular systolic dysfunction.The septal, apical,  inferior, anterior, and anteroseptal are akinetic. The remaining walls are hypokinetic. LVEF calculated using biplane Arreola's method is 27%.  Sev MR  - Cath 5/23 non obstructive  - Continue Entresto    - d/c standing  Lasix 20mg on 7/7  - restart home lasix 20 mg daily when creatinine normalizes  -start on coreg 3.25mg bid  - Lact 3.7-->1.4-> 2.2    GI:  - Continue diet   - Transaminitis in setting of poor perfusion  - down trending  - Cont to trend LFTS with perfusion markers  - Bowel regime - start senna    Renal:  #GREY   - likely in setting of overdiuresis.   - holding lasix  - replete lytes as appropriate     ID:  - afebrile, wbc nl   - Cont to monitor off abx     Heme:  - Cont home Eliquis for A/C for afib   - cbc stable    Endo:  - Previous TSH wnl  48y female with PHMx: of HTN, HF (EF 41%), Afib on Eliquis and metoprolol who presents to the ED for dizziness who was found to be in AFIB with RVR and ADHF 2/2 med noncompliance. Admitted to CCU for close HD monitoring.     Neuro:  - No active issues  - A/Ox3  - No issues     Resp:  - No active issues    Cardiac:   #A fib with RVR  - ECG with afib with RVR without ischemic changes. trops neg.  - CHADS-VAS 3  - TROY/DCCV on 7/7 showed LA thrombus  - change amiodarone to digoxin IV load  - Continue Apixaban   - start digoxin 125mcg and coreg 3.25mg Po bid  - MAP goal>65  - EP following    #Acute on chronic systolic heart failure  - iso medication noncompliance  - Euvolemic on exam    - NICM on [facility] on 5/5/2023  - TTE: Severe segmental left ventricular systolic dysfunction.The septal, apical,  inferior, anterior, and anteroseptal are akinetic. The remaining walls are hypokinetic. LVEF calculated using biplane Arreola's method is 27%.  Sev MR  - Cath 5/23 non obstructive  - Continue Entresto    - d/c standing Lasix 20mg on 7/7  - restart home lasix 20 mg daily when creatinine normalizes  - start on coreg 3.25mg bid  - Lact 3.7-->1.8    GI:  #Transaminitis iso ADHF  - Continue diet   - LFTs downtrending, asymptomatic   - Cont to trend LFTS with perfusion markers  - Bowel regime - start senna    Renal:  #GREY likely prerenal   - holding lasix  - replete lytes as appropriate     ID:  - afebrile, wbc nl   - Cont to monitor off abx     Heme:  - Cont home Eliquis for A/C for afib   - cbc stable    Endo:  - Previous TSH wnl  48y female with PHMx: of HTN, HF (EF 41%), Afib on Eliquis and metoprolol who presents to the ED for dizziness who was found to be in AFIB with RVR and ADHF 2/2 med noncompliance. Admitted to CCU for close HD monitoring.     Neuro:  - No active issues  - A/Ox3  - No issues     Resp:  - No active issues    Cardiac:   #A fib with RVR  - ECG with afib with RVR without ischemic changes. trops neg.  - CHADS-VAS 3  - TROY/DCCV on 7/7 showed LA thrombus  - change amiodarone to digoxin IV load  - Continue Apixaban   - start digoxin 125mcg and coreg increased to 6.125mg PO bid  - MAP goal>65  - EP following    #Acute on chronic systolic heart failure  - iso medication noncompliance  - Euvolemic on exam    - NICM on [facility] on 5/5/2023  - TTE: Severe segmental left ventricular systolic dysfunction.The septal, apical,  inferior, anterior, and anteroseptal are akinetic. The remaining walls are hypokinetic. LVEF calculated using biplane Arreola's method is 27%.  Sev MR  - Cath 5/23 non obstructive  - Continue Entresto    - d/c standing Lasix 20mg on 7/7  - restart home lasix 20 mg daily when creatinine normalizes  - start on coreg 3.25mg bid  - Lact 3.7-->1.8    GI:  #Transaminitis iso ADHF  - Continue diet   - LFTs downtrending, asymptomatic   - Cont to trend LFTS with perfusion markers  - Bowel regimem - start senna    Renal:  #GREY likely prerenal   - holding lasix  - replete lytes as appropriate     ID:  - afebrile, wbc nl   - Cont to monitor off abx     Heme:  - Cont home Eliquis for A/C for afib   - cbc stable    Endo:  - Previous TSH wnl  Neuro:  - No active issues  - A/Ox3  - No issues     Resp:  - No active issues    Cardiac:   #A fib with RVR   - ECG with afib with RVR without ischemic changes. trops neg.  - s/p amio and IV dig load  - CHADS-VAS 3  - TROY/DCCV on 7/7 showed LA thrombus  - Continue Apixaban   - start digoxin 125mcg and coreg increased to 6.125mg PO bid  - Dig level on 7/10 2.4 > dig held and to be restarted every other day pending dig level tomorrow am  - MAP goal>65  - EP following    #Acute on chronic systolic heart failure (EF 27%)  - iso medication noncompliance  - NICM on [facility] on 5/5/2023  - TTE: Severe segmental left ventricular systolic dysfunction. The septal, apical,  inferior, anterior, and anteroseptal are akinetic. The remaining walls are hypokinetic. LVEF calculated using biplane Arreola's method is 27%.  Sev MR  - Cath 5/23 non obstructive  - Continue Entresto and coreg 6.125mg bid  - Starting farxiga 10mg qd  - d/c standing Lasix 20mg on 7/7  - Restart home dose lasix 20mg once CRT normalizes  - Lact 3.7-->1.8    GI:  #Transaminitis iso ADHF  - Continue diet   - LFTs downtrending, asymptomatic   - Cont to trend LFTS with perfusion markers  - Bowel regimen - start senna    Renal:  #GREY likely prerenal   - holding lasix  - replete lytes as appropriate     ID:  - afebrile, wbc nl     Heme:  - Cont home Eliquis for A/C for afib   - cbc stable  - Hep subq for dvt ppx    Endo:  - Previous TSH wnl  Neuro:  - No active issues  - A/Ox3  - No issues     Resp:  - No active issues    Cardiac:   #A fib with RVR   - ECG with afib with RVR without ischemic changes. trops neg.  - s/p amio and IV dig load  - CHADS-VAS 3  - TROY/DCCV on 7/7 showed LA thrombus  - Continue Apixaban   - start digoxin 125mcg and coreg increased to 6.125mg PO bid  - Dig level on 7/10 2.4 > dig held and to be restarted every other day pending dig level tomorrow am  - MAP goal>65  - EP following    #Acute on chronic systolic heart failure (EF 27%)  - iso medication noncompliance  - NICM on [facility] on 5/5/2023  - TTE: Severe segmental left ventricular systolic dysfunction. The septal, apical,  inferior, anterior, and anteroseptal are akinetic. The remaining walls are hypokinetic. LVEF calculated using biplane Arreola's method is 27%.  Sev MR  - Cath 5/23 non obstructive  - Continue Entresto and coreg 6.125mg bid  - Starting farxiga 10mg qd  - d/c standing Lasix 20mg on 7/7  - Restart home dose lasix 20mg once CRT normalizes  - Lact 3.7-->1.8    GI:  #Transaminitis iso ADHF  - Continue diet   - LFTs downtrending, asymptomatic   - Cont to trend LFTS with perfusion markers  - Bowel regimen - start senna    Renal:  #GREY likely prerenal   - holding lasix  - replete lytes as appropriate     ID:  - afebrile, wbc nl     Heme:  - Cont home Eliquis for A/C for afib   - cbc stable  - SCDs     Endo:  - Previous TSH wnl  Neuro:  - No active issues  - A/Ox3  - No issues     Resp:  - No active issues    Cardiac:   #A fib with RVR   - ECG with afib with RVR without ischemic changes. trops neg.  - s/p amio and IV dig load  - CHADS-VAS 3  - TROY/DCCV on 7/7 showed LA thrombus  - Continue Apixaban   - start digoxin 125mcg and coreg increased to 6.125mg PO bid  - Dig level on 7/10 2.4 > dig held and to be restarted every other day pending dig level tomorrow am  - MAP goal>65  - EP following    #Acute on chronic systolic heart failure (EF 27%)  - iso medication noncompliance  - NICM on [facility] on 5/5/2023  - TTE: Severe segmental left ventricular systolic dysfunction. The septal, apical,  inferior, anterior, and anteroseptal are akinetic. The remaining walls are hypokinetic. LVEF calculated using biplane Arreola's method is 27%.  Sev MR  - Cath 5/23 non obstructive  - Continue Entresto and coreg 6.125mg bid  - Starting farxiga 10mg qd  - d/c standing Lasix 20mg on 7/7  - Lact 3.7-->1.8    GI:  #Transaminitis iso ADHF  - Continue diet   - LFTs downtrending, asymptomatic   - Cont to trend LFTS with perfusion markers  - Bowel regimen - start senna    Renal:  #GREY likely prerenal   - holding lasix  - replete lytes as appropriate     ID:  - afebrile, wbc nl     Heme:  - Cont home Eliquis for A/C for afib   - cbc stable  - SCDs     Endo:  - Previous TSH wnl

## 2023-07-11 DIAGNOSIS — R74.01 ELEVATION OF LEVELS OF LIVER TRANSAMINASE LEVELS: ICD-10-CM

## 2023-07-11 DIAGNOSIS — R79.89 OTHER SPECIFIED ABNORMAL FINDINGS OF BLOOD CHEMISTRY: ICD-10-CM

## 2023-07-11 DIAGNOSIS — I51.3 INTRACARDIAC THROMBOSIS, NOT ELSEWHERE CLASSIFIED: ICD-10-CM

## 2023-07-11 DIAGNOSIS — I50.9 HEART FAILURE, UNSPECIFIED: ICD-10-CM

## 2023-07-11 DIAGNOSIS — I48.20 CHRONIC ATRIAL FIBRILLATION, UNSPECIFIED: ICD-10-CM

## 2023-07-11 LAB
ALBUMIN SERPL ELPH-MCNC: 3.2 G/DL — LOW (ref 3.3–5)
ALP SERPL-CCNC: 101 U/L — SIGNIFICANT CHANGE UP (ref 40–120)
ALT FLD-CCNC: 117 U/L — HIGH (ref 4–33)
ANION GAP SERPL CALC-SCNC: 14 MMOL/L — SIGNIFICANT CHANGE UP (ref 7–14)
AST SERPL-CCNC: 60 U/L — HIGH (ref 4–32)
BILIRUB SERPL-MCNC: 0.7 MG/DL — SIGNIFICANT CHANGE UP (ref 0.2–1.2)
BUN SERPL-MCNC: 25 MG/DL — HIGH (ref 7–23)
CALCIUM SERPL-MCNC: 9.4 MG/DL — SIGNIFICANT CHANGE UP (ref 8.4–10.5)
CHLORIDE SERPL-SCNC: 99 MMOL/L — SIGNIFICANT CHANGE UP (ref 98–107)
CO2 SERPL-SCNC: 21 MMOL/L — LOW (ref 22–31)
CREAT SERPL-MCNC: 1.3 MG/DL — SIGNIFICANT CHANGE UP (ref 0.5–1.3)
DIGOXIN SERPL-MCNC: 1.7 NG/ML — SIGNIFICANT CHANGE UP (ref 0.8–2)
EGFR: 51 ML/MIN/1.73M2 — LOW
GLUCOSE SERPL-MCNC: 98 MG/DL — SIGNIFICANT CHANGE UP (ref 70–99)
MAGNESIUM SERPL-MCNC: 2.3 MG/DL — SIGNIFICANT CHANGE UP (ref 1.6–2.6)
PHOSPHATE SERPL-MCNC: 4.5 MG/DL — SIGNIFICANT CHANGE UP (ref 2.5–4.5)
POTASSIUM SERPL-MCNC: 4.6 MMOL/L — SIGNIFICANT CHANGE UP (ref 3.5–5.3)
POTASSIUM SERPL-SCNC: 4.6 MMOL/L — SIGNIFICANT CHANGE UP (ref 3.5–5.3)
PROT SERPL-MCNC: 6.3 G/DL — SIGNIFICANT CHANGE UP (ref 6–8.3)
SODIUM SERPL-SCNC: 134 MMOL/L — LOW (ref 135–145)

## 2023-07-11 PROCEDURE — 99232 SBSQ HOSP IP/OBS MODERATE 35: CPT

## 2023-07-11 PROCEDURE — 93975 VASCULAR STUDY: CPT | Mod: 26

## 2023-07-11 RX ORDER — FUROSEMIDE 40 MG
20 TABLET ORAL
Refills: 0 | Status: DISCONTINUED | OUTPATIENT
Start: 2023-07-11 | End: 2023-07-12

## 2023-07-11 RX ORDER — DIGOXIN 250 MCG
125 TABLET ORAL EVERY OTHER DAY
Refills: 0 | Status: DISCONTINUED | OUTPATIENT
Start: 2023-07-11 | End: 2023-07-12

## 2023-07-11 RX ADMIN — CHLORHEXIDINE GLUCONATE 1 APPLICATION(S): 213 SOLUTION TOPICAL at 05:10

## 2023-07-11 RX ADMIN — Medication 125 MICROGRAM(S): at 11:45

## 2023-07-11 RX ADMIN — CARVEDILOL PHOSPHATE 6.25 MILLIGRAM(S): 80 CAPSULE, EXTENDED RELEASE ORAL at 18:18

## 2023-07-11 RX ADMIN — APIXABAN 5 MILLIGRAM(S): 2.5 TABLET, FILM COATED ORAL at 18:18

## 2023-07-11 RX ADMIN — SACUBITRIL AND VALSARTAN 1 TABLET(S): 24; 26 TABLET, FILM COATED ORAL at 18:18

## 2023-07-11 RX ADMIN — CARVEDILOL PHOSPHATE 6.25 MILLIGRAM(S): 80 CAPSULE, EXTENDED RELEASE ORAL at 05:08

## 2023-07-11 RX ADMIN — SACUBITRIL AND VALSARTAN 1 TABLET(S): 24; 26 TABLET, FILM COATED ORAL at 05:08

## 2023-07-11 RX ADMIN — DAPAGLIFLOZIN 10 MILLIGRAM(S): 10 TABLET, FILM COATED ORAL at 13:56

## 2023-07-11 RX ADMIN — SENNA PLUS 2 TABLET(S): 8.6 TABLET ORAL at 22:33

## 2023-07-11 RX ADMIN — BENZOCAINE AND MENTHOL 1 LOZENGE: 5; 1 LIQUID ORAL at 05:13

## 2023-07-11 RX ADMIN — APIXABAN 5 MILLIGRAM(S): 2.5 TABLET, FILM COATED ORAL at 05:07

## 2023-07-11 RX ADMIN — Medication 20 MILLIGRAM(S): at 13:56

## 2023-07-11 NOTE — PROGRESS NOTE ADULT - PROBLEM SELECTOR PLAN 2
- CHADS-VAS 3  - Continue Apixaban for AC   - C/w digoxin 125mcg PO every other day  - Continue Coreg to 6.25 mg BID, currently rate controlled   - Outpatient follow-up with EP

## 2023-07-11 NOTE — PROGRESS NOTE ADULT - SUBJECTIVE AND OBJECTIVE BOX
[facility] Division of Hospital Medicine  Lauren Chicas MD  Available on [employer] TEAMS    SUBJECTIVE / OVERNIGHT EVENTS: Patient seen and examined. Reports      MEDICATIONS  (STANDING):  apixaban 5 milliGRAM(s) Oral every 12 hours  carvedilol 6.25 milliGRAM(s) Oral every 12 hours  chlorhexidine 2% Cloths 1 Application(s) Topical <User Schedule>  dapagliflozin 10 milliGRAM(s) Oral every 24 hours  digoxin     Tablet 125 MICROGram(s) Oral every other day  furosemide    Tablet 20 milliGRAM(s) Oral <User Schedule>  melatonin 3 milliGRAM(s) Oral at bedtime  sacubitril 49 mG/valsartan 51 mG 1 Tablet(s) Oral two times a day  senna 2 Tablet(s) Oral at bedtime    MEDICATIONS  (PRN):  benzocaine/menthol Lozenge 1 Lozenge Oral four times a day PRN Sore Throat      I&O's Summary    10 Jul 2023 07:01  -  11 Jul 2023 07:00  --------------------------------------------------------  IN: 1320 mL / OUT: 0 mL / NET: 1320 mL        PHYSICAL EXAM:  Vital Signs Last 24 Hrs  T(C): 36.7 (11 Jul 2023 12:00), Max: 36.7 (11 Jul 2023 12:00)  T(F): 98 (11 Jul 2023 12:00), Max: 98 (11 Jul 2023 12:00)  HR: 97 (11 Jul 2023 12:00) (70 - 101)  BP: 121/75 (11 Jul 2023 12:00) (99/67 - 144/76)  BP(mean): 85 (11 Jul 2023 12:00) (70 - 108)  RR: 18 (11 Jul 2023 12:00) (14 - 25)  SpO2: 99% (11 Jul 2023 12:00) (95% - 100%)    Parameters below as of 11 Jul 2023 16:00  Patient On (Oxygen Delivery Method): room air      CONSTITUTIONAL: NAD, well-developed, well-groomed  EYES: Conjunctiva and sclera clear  ENMT: Moist oral mucosa, no pharyngeal injection or exudates; normal dentition  RESPIRATORY: Normal respiratory effort; lungs are clear to auscultation bilaterally; No wheezes or rales  CARDIOVASCULAR: Regular rate and rhythm; Normal S1 and S2; No murmurs, rubs, or gallops; No lower extremity edema  ABDOMEN: Soft, Nontender, Nondistended; Bowel sounds present  MUSCULOSKELETAL:  No clubbing or cyanosis of digits; No joint swelling or tenderness to palpation  PSYCH: AAOx3 (oriented to person, place, and time); affect appropriate  NEUROLOGY: CN II-XII are intact and symmetric; no gross sensory deficits  SKIN: No rashes; No palpable lesions    LABS:                        15.7   3.93  )-----------( 268      ( 10 Jul 2023 06:30 )             48.6     07-11    134<L>  |  99  |  25<H>  ----------------------------<  98  4.6   |  21<L>  |  1.30    Ca    9.4      11 Jul 2023 03:20  Phos  4.5     07-11  Mg     2.30     07-11    TPro  6.3  /  Alb  3.2<L>  /  TBili  0.7  /  DBili  x   /  AST  60<H>  /  ALT  117<H>  /  AlkPhos  101  07-11          Urinalysis Basic - ( 11 Jul 2023 03:20 )    Color: x / Appearance: x / SG: x / pH: x  Gluc: 98 mg/dL / Ketone: x  / Bili: x / Urobili: x   Blood: x / Protein: x / Nitrite: x   Leuk Esterase: x / RBC: x / WBC x   Sq Epi: x / Non Sq Epi: x / Bacteria: x        SARS-CoV-2: NotDetec (05 Jul 2023 17:30)  COVID-19 PCR: NotDetec (03 May 2023 13:50)      RADIOLOGY & ADDITIONAL TESTS:  New Results Reviewed Today:   New Imaging Personally Reviewed Today:  New Electrocardiogram Personally Reviewed Today:  Prior or Outpatient Records Reviewed Today:    COMMUNICATION:  Care Discussed with Consultants/Other Providers and Details of Discussion:  Discussions with Patient/Family:  PCP Communication:

## 2023-07-11 NOTE — PROGRESS NOTE ADULT - ASSESSMENT
48F with hx of HTN, HF (EF 41%), Afib on Eliquis and metoprolol who presents to the ED for dizziness who was found to be in AFIB with RVR and ADHF. Admitted to CCU for diureses and rate control. TROY/DCCV cancelled dt LA appendage thrombus, now transferred to medicine.

## 2023-07-11 NOTE — PROGRESS NOTE ADULT - PROBLEM SELECTOR PLAN 5
LFTs elevated, previously elevated in May as well   - Suspect hepatic congestion from ADHF   - Abd US showing Mild hepatomegaly  - Hepatitis panel in AM   - Holding statin for now

## 2023-07-11 NOTE — PROGRESS NOTE ADULT - PROBLEM SELECTOR PLAN 1
S/p diuresis in CCU   - Likely tachy induced   - Euvolemic on exam today    - NICM on [facility] on 5/5/2023  - TTE: Severe segmental left ventricular systolic dysfunction. The septal, apical,  inferior, anterior, and anteroseptal are akinetic. The remaining walls are hypokinetic. LVEF calculated using biplane Arreola's method is 27%.  Sev MR  - C/w meds per cardiology: Coreg, entresto, lasix 20mg q48h, Farxiga

## 2023-07-11 NOTE — PROGRESS NOTE ADULT - PROBLEM SELECTOR PLAN 4
Cr peaked 1.42, suspect has component of CKD as previous Cr as high as 1.7  - Monitor Cr  - Renally dose meds  - Avoid nephrotoxins

## 2023-07-11 NOTE — PROGRESS NOTE ADULT - ASSESSMENT
{\rtf1\riwbeh18056\ansi\almosog3599\ftnbj\uc1\deff0  {\fonttbl{\f0 \fnil Segoe UI;}{\f1 \fnil \fcharset0 Segoe UI;}{\f2 \fnil Times New Al;}}  {\colortbl ;\dia226\udibk978\rrei064 ;\red0\green0\blue0 ;\red0\green0\umcz849 ;\red0\green0\blue0 ;}  {\stylesheet{\f0\fs20 Normal;}{\cs1 Default Paragraph Font;}{\cs2\f0\fs16 Line Number;}{\cs3\f2\fs24\ul\cf3 Hyperlink;}}  {\*\revtbl{Unknown;}}  \qqsfwq08758\idlxcc08882\mfwcg1515\yijqg1682\xtvfq9440\lqnak0072\cxylxij298\sznklwy333\nogrowautofit\pgigde537\formshade\nofeaturethrottle1\dntblnsbdb\fet4\aendnotes\aftnnrlc\pgbrdrhead\pgbrdrfoot  \sectd\ffllad62507\frzjpp80563\guttersxn0\xcuhqmmb1179\izfhpriv3395\crjtipcd0960\zolhflvi6126\oolxjvf327\oxymekd638\sbkpage\pgncont\pgndec  \plain\plain\f0\fs24\ql\plain\f0\fs24{\*\bkmkstart [id number]}{\*\bkmkend [id number]}\plain\f0\fs20\xpad1841\hich\f0\dbch\f0\loch\f0\fs20 48y female with PHMx: of HTN, HF (EF 41%), Afib on Eliquis and metoprolol who presents to the ED for dizziness   who was found to be in AFIB with RVR and ADHF 2/2 med noncompliance. Admitted to CCU for diureses and rate control. TROY/DCCV cancelled dt LA appendage thrombus.\par  \par  \par  Cardiac: \par  #A fib with RVR - - In setting of med noncompliance \par  - ECG  with afib  with RVR without ischemic changes. trops neg.\par  - CHADS-VAS 3\par  PLan\par  - Continue Apixaban \par  - digoxin level 1.7. Start digoxin 125mcg PO every other day\par  - Continue Coreg to 6.25 mg BID\par  - EP following\par  \par  #Acute on chronic systolic heart failure \par  - Likely tachy induced \par  - Euvolemic on exam  \par  - NICM on [facility] on 5/5/2023\par  - TTE: Severe segmental left ventricular systolic dysfunction.The septal, apical,  inferior, anterior, and anteroseptal are akinetic. The remaining walls are hypokinetic. LVEF calculated using biplane Arreola's method is 27%.  Sev MR\par  \par  Plan\par  - Continue Entresto  \par  -  restart home lasix 20 mg daily when creatinine normalizes\par  - Continue Coreg 6.25 PO bid\par  - Lact 3.7-->1.4-> 2.2.\plain\f1\fs20\odje2144\hich\f1\dbch\f1\loch\f1\cf2\fs20\strike\plain\f1\fs20\lqfa2155\hich\f1\dbch\f1\loch\f1\cf2\fs20\plain\f0\fs20\bkpl9302\hich\f0\dbch\f0\loch\f0\fs20\par  \par  \par  }   48y female with PHMx: of HTN, HF (EF 41%), Afib on Eliquis and metoprolol who presents to the ED for dizziness who was found to be in AFIB with RVR and ADHF 2/2 med noncompliance. Admitted to CCU for diureses and rate control. TROY/DCCV cancelled dt LA appendage thrombus.      Cardiac:   #A fib with RVR - - In setting of med noncompliance   - ECG  with afib  with RVR without ischemic changes. trops neg.  - CHADS-VAS 3  PLan  - Continue Apixaban   - digoxin level 1.7. Start digoxin 125mcg PO every other day  - Continue Coreg to 6.25 mg BID  - f/u ep out PT    #Acute on chronic systolic heart failure   - Likely tachy induced   - Euvolemic on exam    - NICM on [facility] on 5/5/2023  - TTE: Severe segmental left ventricular systolic dysfunction.The septal, apical,  inferior, anterior, and anteroseptal are akinetic. The remaining walls are hypokinetic. LVEF calculated using biplane Arreola's method is 27%.  Sev MR    Plan  -  Continue Entresto    -  restart home lasix 20 mg daily when creatinine normalizes  - Continue Coreg 6.25 PO bid  - Lact 3.7-->1.4-> 2.2.     48y female with PHMx: of HTN, HF (EF 41%), Afib on Eliquis and metoprolol who presents to the ED for dizziness who was found to be in AFIB with RVR and ADHF 2/2 med noncompliance. Admitted to CCU for diureses and rate control. TROY/DCCV cancelled dt LA appendage thrombus.      Cardiac:   #A fib with RVR - - In setting of med noncompliance   - ECG  with afib  with RVR without ischemic changes. trops neg.  - CHADS-VAS 3  PLan  - Continue Apixaban   - digoxin level 1.7. Start digoxin 125mcg PO every other day  - Continue Coreg to 6.25 mg BID  - f/u ep out PT    #Acute on chronic systolic heart failure   - Likely tachy induced   - Euvolemic on exam    - NICM on [facility] on 5/5/2023  - TTE: Severe segmental left ventricular systolic dysfunction.The septal, apical,  inferior, anterior, and anteroseptal are akinetic. The remaining walls are hypokinetic. LVEF calculated using biplane Arreola's method is 27%.  Sev MR    Plan  -  Continue Entresto    -  restart home lasix 20 mg daily when creatinine normalizes  - Continue Coreg 6.25 PO bid  - creatinine: 1.2 -- . 1.18 --> 1.3  - sodium -->138 -->141 -->134     48y female with PHMx: of HTN, HF (EF 41%), Afib on Eliquis and metoprolol who presents to the ED for dizziness who was found to be in AFIB with RVR and ADHF 2/2 med noncompliance. Admitted to CCU for diureses and rate control. TROY/DCCV cancelled dt LA appendage thrombus.      Cardiac:   #A fib with RVR - - In setting of med noncompliance   - ECG  with afib  with RVR without ischemic changes. trops neg.  - CHADS-VAS 3  PLan  - Continue Apixaban   - digoxin level 1.7. Start digoxin 125mcg PO every other day  - Continue Coreg to 6.25 mg BID  - f/u ep out PT    #Acute on chronic systolic heart failure   - Likely tachy induced   - Euvolemic on exam    - NICM on [facility] on 5/5/2023  - TTE: Severe segmental left ventricular systolic dysfunction.The septal, apical,  inferior, anterior, and anteroseptal are akinetic. The remaining walls are hypokinetic. LVEF calculated using biplane Arreola's method is 27%.  Sev MR    Plan  -  Continue Entresto    -  restart home lasix 20 mg every other day. 1st dose today  - RUQ US for transaminitis - given results of ruq us will discharge with zetia v statin  - rec hepatitis panel  - Continue Coreg 6.25 PO bid  - creatinine: 1.2 -- . 1.18 --> 1.3  - sodium -->138 -->141 -->134

## 2023-07-12 ENCOUNTER — TRANSCRIPTION ENCOUNTER (OUTPATIENT)
Age: 48
End: 2023-07-12

## 2023-07-12 VITALS
SYSTOLIC BLOOD PRESSURE: 101 MMHG | HEART RATE: 93 BPM | DIASTOLIC BLOOD PRESSURE: 78 MMHG | OXYGEN SATURATION: 100 % | RESPIRATION RATE: 20 BRPM

## 2023-07-12 LAB
ALBUMIN SERPL ELPH-MCNC: 3.4 G/DL — SIGNIFICANT CHANGE UP (ref 3.3–5)
ALP SERPL-CCNC: 120 U/L — SIGNIFICANT CHANGE UP (ref 40–120)
ALT FLD-CCNC: 119 U/L — HIGH (ref 4–33)
ANION GAP SERPL CALC-SCNC: 13 MMOL/L — SIGNIFICANT CHANGE UP (ref 7–14)
AST SERPL-CCNC: 66 U/L — HIGH (ref 4–32)
BILIRUB DIRECT SERPL-MCNC: 0.2 MG/DL — SIGNIFICANT CHANGE UP (ref 0–0.3)
BILIRUB INDIRECT FLD-MCNC: 0.3 MG/DL — SIGNIFICANT CHANGE UP (ref 0–1)
BILIRUB SERPL-MCNC: 0.5 MG/DL — SIGNIFICANT CHANGE UP (ref 0.2–1.2)
BUN SERPL-MCNC: 31 MG/DL — HIGH (ref 7–23)
CALCIUM SERPL-MCNC: 9.5 MG/DL — SIGNIFICANT CHANGE UP (ref 8.4–10.5)
CHLORIDE SERPL-SCNC: 102 MMOL/L — SIGNIFICANT CHANGE UP (ref 98–107)
CO2 SERPL-SCNC: 22 MMOL/L — SIGNIFICANT CHANGE UP (ref 22–31)
CREAT SERPL-MCNC: 1.23 MG/DL — SIGNIFICANT CHANGE UP (ref 0.5–1.3)
EGFR: 54 ML/MIN/1.73M2 — LOW
FOLATE SERPL-MCNC: 19.3 NG/ML — HIGH (ref 3.1–17.5)
GLUCOSE SERPL-MCNC: 106 MG/DL — HIGH (ref 70–99)
HAV IGM SER-ACNC: SIGNIFICANT CHANGE UP
HBV CORE IGM SER-ACNC: SIGNIFICANT CHANGE UP
HBV SURFACE AG SER-ACNC: SIGNIFICANT CHANGE UP
HCT VFR BLD CALC: 45.1 % — HIGH (ref 34.5–45)
HCV AB S/CO SERPL IA: 0.13 S/CO — SIGNIFICANT CHANGE UP (ref 0–0.99)
HCV AB SERPL-IMP: SIGNIFICANT CHANGE UP
HGB BLD-MCNC: 14.5 G/DL — SIGNIFICANT CHANGE UP (ref 11.5–15.5)
MAGNESIUM SERPL-MCNC: 2.2 MG/DL — SIGNIFICANT CHANGE UP (ref 1.6–2.6)
MCHC RBC-ENTMCNC: 32.2 GM/DL — SIGNIFICANT CHANGE UP (ref 32–36)
MCHC RBC-ENTMCNC: 32.3 PG — SIGNIFICANT CHANGE UP (ref 27–34)
MCV RBC AUTO: 100.4 FL — HIGH (ref 80–100)
NRBC # BLD: 0 /100 WBCS — SIGNIFICANT CHANGE UP (ref 0–0)
NRBC # FLD: 0 K/UL — SIGNIFICANT CHANGE UP (ref 0–0)
PHOSPHATE SERPL-MCNC: 4.5 MG/DL — SIGNIFICANT CHANGE UP (ref 2.5–4.5)
PLATELET # BLD AUTO: 277 K/UL — SIGNIFICANT CHANGE UP (ref 150–400)
POTASSIUM SERPL-MCNC: 4.5 MMOL/L — SIGNIFICANT CHANGE UP (ref 3.5–5.3)
POTASSIUM SERPL-SCNC: 4.5 MMOL/L — SIGNIFICANT CHANGE UP (ref 3.5–5.3)
PROT SERPL-MCNC: 6.5 G/DL — SIGNIFICANT CHANGE UP (ref 6–8.3)
RBC # BLD: 4.49 M/UL — SIGNIFICANT CHANGE UP (ref 3.8–5.2)
RBC # FLD: 16.2 % — HIGH (ref 10.3–14.5)
SODIUM SERPL-SCNC: 137 MMOL/L — SIGNIFICANT CHANGE UP (ref 135–145)
VIT B12 SERPL-MCNC: 1031 PG/ML — HIGH (ref 200–900)
WBC # BLD: 4.28 K/UL — SIGNIFICANT CHANGE UP (ref 3.8–10.5)
WBC # FLD AUTO: 4.28 K/UL — SIGNIFICANT CHANGE UP (ref 3.8–10.5)

## 2023-07-12 PROCEDURE — 99239 HOSP IP/OBS DSCHRG MGMT >30: CPT

## 2023-07-12 RX ORDER — DAPAGLIFLOZIN 10 MG/1
1 TABLET, FILM COATED ORAL
Qty: 30 | Refills: 0
Start: 2023-07-12 | End: 2023-08-10

## 2023-07-12 RX ORDER — SACUBITRIL AND VALSARTAN 24; 26 MG/1; MG/1
1 TABLET, FILM COATED ORAL
Qty: 60 | Refills: 0
Start: 2023-07-12 | End: 2023-08-10

## 2023-07-12 RX ORDER — FUROSEMIDE 40 MG
1 TABLET ORAL
Qty: 15 | Refills: 0
Start: 2023-07-12 | End: 2023-08-10

## 2023-07-12 RX ORDER — METOPROLOL TARTRATE 50 MG
1 TABLET ORAL
Refills: 0 | DISCHARGE

## 2023-07-12 RX ORDER — CARVEDILOL PHOSPHATE 80 MG/1
1 CAPSULE, EXTENDED RELEASE ORAL
Qty: 60 | Refills: 0
Start: 2023-07-12 | End: 2023-08-10

## 2023-07-12 RX ORDER — DIGOXIN 250 MCG
1 TABLET ORAL
Qty: 15 | Refills: 0
Start: 2023-07-12 | End: 2023-08-10

## 2023-07-12 RX ORDER — APIXABAN 2.5 MG/1
1 TABLET, FILM COATED ORAL
Qty: 60 | Refills: 0
Start: 2023-07-12 | End: 2023-08-10

## 2023-07-12 RX ADMIN — APIXABAN 5 MILLIGRAM(S): 2.5 TABLET, FILM COATED ORAL at 05:49

## 2023-07-12 RX ADMIN — DAPAGLIFLOZIN 10 MILLIGRAM(S): 10 TABLET, FILM COATED ORAL at 14:58

## 2023-07-12 RX ADMIN — SACUBITRIL AND VALSARTAN 1 TABLET(S): 24; 26 TABLET, FILM COATED ORAL at 05:50

## 2023-07-12 RX ADMIN — CARVEDILOL PHOSPHATE 6.25 MILLIGRAM(S): 80 CAPSULE, EXTENDED RELEASE ORAL at 05:49

## 2023-07-12 RX ADMIN — CHLORHEXIDINE GLUCONATE 1 APPLICATION(S): 213 SOLUTION TOPICAL at 07:17

## 2023-07-12 NOTE — DISCHARGE NOTE PROVIDER - NSDCFUSCHEDAPPT_GEN_ALL_CORE_FT
Piyush Arambula  [facility]  CARDIOLOGY [address]  Scheduled Appointment: 08/02/2023    Brad Reed  [facility] [phone]  Scheduled Appointment: 08/09/2023

## 2023-07-12 NOTE — PROGRESS NOTE ADULT - SUBJECTIVE AND OBJECTIVE BOX
FOLLOW UP:    SUBJECTIVE/OBSERVATIONS:  OVERNIGHT EVENTS:  TELE EVENTS:     Vital Signs Last 24 Hrs  T(C): 36.6 (12 Jul 2023 04:00), Max: 37 (11 Jul 2023 20:00)  T(F): 97.9 (12 Jul 2023 04:00), Max: 98.6 (11 Jul 2023 20:00)  HR: 82 (12 Jul 2023 04:00) (76 - 97)  BP: 133/91 (12 Jul 2023 04:00) (112/89 - 133/91)  BP(mean): 102 (12 Jul 2023 04:00) (85 - 103)  RR: 22 (12 Jul 2023 04:00) (14 - 22)  SpO2: 97% (12 Jul 2023 04:00) (96% - 99%)    Parameters below as of 12 Jul 2023 04:00  Patient On (Oxygen Delivery Method): room air      I&O's Summary    11 Jul 2023 07:01  -  12 Jul 2023 07:00  --------------------------------------------------------  IN: 600 mL / OUT: 0 mL / NET: 600 mL        MEDICATIONS:  apixaban 5 milliGRAM(s) Oral every 12 hours  carvedilol 6.25 milliGRAM(s) Oral every 12 hours  digoxin     Tablet 125 MICROGram(s) Oral every other day  furosemide    Tablet 20 milliGRAM(s) Oral <User Schedule>  sacubitril 49 mG/valsartan 51 mG 1 Tablet(s) Oral two times a day        melatonin 3 milliGRAM(s) Oral at bedtime    senna 2 Tablet(s) Oral at bedtime    dapagliflozin 10 milliGRAM(s) Oral every 24 hours    benzocaine/menthol Lozenge 1 Lozenge Oral four times a day PRN  chlorhexidine 2% Cloths 1 Application(s) Topical <User Schedule>      REVIEW OF SYSTEMS:  Complete 10point ROS negative.    PHYSICAL EXAM:  General: NAD  Cardiovascular: Normal S1 S2, No JVD, No murmurs, No edema  Respiratory: Lungs clear to auscultation	  Gastrointestinal:  Soft, Non-tender, + BS	  Skin: warm and dry, No rashes, No ecchymoses, No cyanosis	  Extremities: Normal range of motion, No clubbing, cyanosis or edema  Vascular: Peripheral pulses palpable 2+ bilaterally    LABS:	 	    CBC Full  -  ( 12 Jul 2023 03:03 )  WBC Count : 4.28 K/uL  Hemoglobin : 14.5 g/dL  Hematocrit : 45.1 %  Platelet Count - Automated : 277 K/uL  Mean Cell Volume : 100.4 fL  Mean Cell Hemoglobin : 32.3 pg  Mean Cell Hemoglobin Concentration : 32.2 gm/dL  Auto Neutrophil # : x  Auto Lymphocyte # : x  Auto Monocyte # : x  Auto Eosinophil # : x  Auto Basophil # : x  Auto Neutrophil % : x  Auto Lymphocyte % : x  Auto Monocyte % : x  Auto Eosinophil % : x  Auto Basophil % : x    07-12    137  |  102  |  31<H>  ----------------------------<  106<H>  4.5   |  22  |  1.23  07-11    134<L>  |  99  |  25<H>  ----------------------------<  98  4.6   |  21<L>  |  1.30    Ca    9.5      12 Jul 2023 03:03  Ca    9.4      11 Jul 2023 03:20  Phos  4.5     07-12  Phos  4.5     07-11  Mg     2.20     07-12  Mg     2.30     07-11    TPro  6.5  /  Alb  3.4  /  TBili  0.5  /  DBili  0.2  /  AST  66<H>  /  ALT  119<H>  /  AlkPhos  120  07-12  TPro  6.3  /  Alb  3.2<L>  /  TBili  0.7  /  DBili  x   /  AST  60<H>  /  ALT  117<H>  /  AlkPhos  101  07-11      proBNP:   Lipid Profile:   HgA1c:   TSH:       CARDIAC MARKERS:

## 2023-07-12 NOTE — CHART NOTE - NSCHARTNOTEFT_GEN_A_CORE
Patient seen and examined. Please see discharge note for full physical examination and discharge details. Patient is stable for discharge today.     Lauren Chicas MD  Hospitalist  Pager #: [phone]

## 2023-07-12 NOTE — PROGRESS NOTE ADULT - REASON FOR ADMISSION
afib with rvr

## 2023-07-12 NOTE — DISCHARGE NOTE PROVIDER - HOSPITAL COURSE
48y female with pmhx of HTN, CHF (EF 41%), Afib on Eliquis and metoprolol who presented to the ED for dizziness and found to be in AFIB with RVR. Patient states that her medication was changed last week from carvedilol to metoprolol (she was on maximum dose carvedilol but still not achieving rate control). She reports worsening dizziness and fatigue on this medication, reports medication noncompliance as well as 1 week of diarrhea.    Pt previously admitted to [facility] 5/8; at that time presented w/ 2 weeks of nausea, fatigue and generalized weakness, shortly after a vacation in [place] with recreational alcohol binging. She had been transferred over to [facility] for a diagnostic LHC in the setting of NSTEMI (without EKG changes or chest pain) w/ HFrEF (negative prior stress test but never angiogram), and atrial fibrillation of unknown duration. Cath was nonobstructive. Patient is precontemplative to DCCV.  She was discharged on entresto 24/26bid, carvedilol, and apixaban.    In the ED HR 140s-170s BP 80/50s - 120s/90s. Pt received metoprolol 5mg IV x1, 1L LR bolus. HR persistently 130s-150s.  Lactate 3.7 with neg trops but GREY and transaminitis. Pt admitted to CCU for management of afib with RVR and acute decompensated heart failure.     In CCU, patient was transitioned from digoxin to amiodarone and diuresed with bumex 1mg po x2 with good response. Her medications were optimized from cardiac standpoint. TROY/DCCV on 7/7 showed LA thrombus, so procedure was aborted. Patient stable for dc home with close interval follow-up with cardiology. 48y female with pmhx of HTN, CHF (EF 41%), Afib on Eliquis and metoprolol who presented to the ED for dizziness and found to be in AFIB with RVR. Patient states that her medication was changed last week from carvedilol to metoprolol (she was on maximum dose carvedilol but still not achieving rate control). She reports worsening dizziness and fatigue on this medication, reports medication noncompliance as well as 1 week of diarrhea. Pt previously admitted to [facility] 5/8/23 had a diagnostic LHC in the setting of NSTEMI (without EKG changes or chest pain) w/ HFrEF (negative prior stress test but never angiogram), and atrial fibrillation of unknown duration. Cath was nonobstructive.     In the ED HR 140s-170s BP 80/50s - 120s/90s. Pt received metoprolol 5mg IV x1, 1L LR bolus. HR persistently 130s-150s.  Lactate 3.7 with neg trops but GREY and transaminitis. Pt admitted to CCU for management of afib with RVR and acute decompensated heart failure.     In CCU, patient was transitioned from digoxin to amiodarone and diuresed with bumex 1mg po x2 with good response. Her medications were optimized from cardiac standpoint. TROY/DCCV on 7/7 showed LA thrombus, so procedure was aborted. Patient stable for dc home with close interval follow-up with cardiology. 48y female with pmhx of HTN, CHF (EF 41%), Afib on Eliquis and metoprolol who presented to the ED for dizziness and found to be in AFIB with RVR. Patient states that her medication was changed last week from carvedilol to metoprolol (she was on maximum dose carvedilol but still not achieving rate control). She reports worsening dizziness and fatigue on this medication, reports medication noncompliance as well as 1 week of diarrhea. Pt previously admitted to [facility] 5/8/23 had a diagnostic LHC in the setting of NSTEMI (without EKG changes or chest pain) w/ HFrEF (negative prior stress test but never angiogram), and atrial fibrillation of unknown duration. Cath was nonobstructive.       In the ED HR 140s-170s BP 80/50s - 120s/90s. Pt received metoprolol 5mg IV x1, 1L LR bolus. HR persistently 130s-150s.  Lactate 3.7 with neg trops but GREY and transaminitis. Pt admitted to CCU for management of afib with RVR and acute decompensated heart failure.     In CCU, patient was transitioned from digoxin to amiodarone and diuresed with bumex 1mg po x2 with good response. Her medications were optimized from cardiac standpoint. TROY/DCCV on 7/7 showed LA thrombus, so procedure was aborted. Patient stable for dc home with close interval follow-up with cardiology.

## 2023-07-12 NOTE — DISCHARGE NOTE PROVIDER - NSDCCPCAREPLAN_GEN_ALL_CORE_FT
PRINCIPAL DISCHARGE DIAGNOSIS  Diagnosis: Acute decompensated heart failure  Assessment and Plan of Treatment: You were found to be in acute heart failure, likely due to fast heart rate from your Afib. It is very important that you continue to take your medications as prescribed. Please follow-up closely with Dr. Arambula during your scheduled appointment. Please follow a low sodium diet and limit fluid intake to about 1.5 liters per day.   If you develop chest pain, trouble breathing, swelling of your legs or abdomen, please call your cardiologist and return to the ER.      SECONDARY DISCHARGE DIAGNOSES  Diagnosis: Chronic atrial fibrillation  Assessment and Plan of Treatment: You came to the hospital with a fast and irregular heart rate. It is very important for you to take your medications as prescribed. Please follow-up with your EP doctor Dr. Reed during your scheduled appointment.   If you develop dizziness, lightheadedness, palpitations, please return to the ER.    Diagnosis: LA thrombus  Assessment and Plan of Treatment: You were found to have a blood clot around one area of the heart. Please continue to take your blood thinner as prescribed.   If you notice black or tarry stool or blood in your stool, please stop taking Eliquis and return to the ER.

## 2023-07-12 NOTE — DISCHARGE NOTE PROVIDER - NSDCMRMEDTOKEN_GEN_ALL_CORE_FT
albuterol 90 mcg/inh inhalation aerosol: 2 puff(s) inhaled every 6 hours, As needed, Shortness of Breath and/or Wheezing  apixaban 5 mg oral tablet: 1 tab(s) orally 2 times a day  furosemide 20 mg oral tablet: 1 tab(s) orally once a day  Lopressor 50 mg oral tablet: 1 orally 2 times a day  sacubitril-valsartan 24 mg-26 mg oral tablet: 1 tab(s) orally 2 times a day   apixaban 5 mg oral tablet: 1 tab(s) orally 2 times a day  carvedilol 6.25 mg oral tablet: 1 tab(s) orally every 12 hours  dapagliflozin 10 mg oral tablet: 1 tab(s) orally every 24 hours  digoxin 125 mcg (0.125 mg) oral tablet: 1 tab(s) orally every other day  furosemide 20 mg oral tablet: 1 tab(s) orally every other day Last dose was 7/11/23, next dose 7/13/23  sacubitril-valsartan 49 mg-51 mg oral tablet: 1 tab(s) orally 2 times a day

## 2023-07-12 NOTE — DISCHARGE NOTE PROVIDER - PROVIDER TOKENS
PROVIDER:[TOKEN:[[id number]],SCHEDULEDAPPT:[08/02/2023]],PROVIDER:[TOKEN:[[id number]],SCHEDULEDAPPT:[08/09/2023]]

## 2023-07-12 NOTE — PROGRESS NOTE ADULT - PROVIDER SPECIALTY LIST ADULT
CCU
Cardiology
Electrophysiology
Electrophysiology
CCU
CCU
Electrophysiology
Hospitalist

## 2023-07-12 NOTE — DISCHARGE NOTE PROVIDER - CARE PROVIDER_API CALL
Piyush Arambula  Cardiology  [id number] [place] [address]  Phone: [phone]  Fax: [phone]  Scheduled Appointment: 08/02/2023    Brad Reed  Cardiac Electrophysiology  [address] [place] [address], [address]  [place] [address]  Phone: [phone]  Fax: [phone]  Scheduled Appointment: 08/09/2023

## 2023-07-12 NOTE — PROGRESS NOTE ADULT - ASSESSMENT
48y female with PHMx: of HTN, HF (EF 41%), Afib on Eliquis and metoprolol who presents to the ED for dizziness who was found to be in AFIB with RVR and ADHF 2/2 med noncompliance. Admitted to CCU for diureses and rate control. TROY/DCCV cancelled dt LA appendage thrombus.      Cardiac:   #A fib with RVR - - In setting of med noncompliance   - ECG  with afib  with RVR without ischemic changes. trops neg.  - CHADS-VAS 3  PLan  - Continue Apixaban   - digoxin level 1.7. Start digoxin 125mcg PO every other day  - Continue Coreg to 6.25 mg BID  - f/u ep out PT    #Acute on chronic systolic heart failure   - Likely tachy induced   - Euvolemic on exam    - NICM on [facility] on 5/5/2023  - TTE: Severe segmental left ventricular systolic dysfunction.The septal, apical,  inferior, anterior, and anteroseptal are akinetic. The remaining walls are hypokinetic. LVEF calculated using biplane Arreola's method is 27%.  Sev MR    Plan  -  Continue Entresto    -  restart home lasix 20 mg every other day. 1st dose today  - RUQ US for transaminitis - given results of ruq us will discharge with zetia v statin  - rec hepatitis panel  - Continue Coreg 6.25 PO bid  - creatinine: 1.2 -- . 1.18 --> 1.3  - sodium -->138 -->141 -->134    discharge planning  48y female with PHMx: of HTN, HF (EF 41%), Afib on Eliquis and metoprolol who presents to the ED for dizziness who was found to be in AFIB with RVR and ADHF 2/2 med noncompliance. Admitted to CCU for diureses and rate control. TROY/DCCV cancelled dt LA appendage thrombus.      Cardiac:   #A fib with RVR - - In setting of med noncompliance   - ECG  with afib  with RVR without ischemic changes. trops neg.  - CHADS-VAS 3  PLan  - Continue Apixaban   - digoxin level 1.7. Start digoxin 125mcg PO every other day  - Continue Coreg to 6.25 mg BID  - f/u ep out PT    #Acute on chronic systolic heart failure   - Likely tachy induced   - Euvolemic on exam    - NICM on [facility] on 5/5/2023  - TTE: Severe segmental left ventricular systolic dysfunction.The septal, apical,  inferior, anterior, and anteroseptal are akinetic. The remaining walls are hypokinetic. LVEF calculated using biplane Arreola's method is 27%.  Sev MR    Plan  -  Continue Entresto    -  restart home lasix 20 mg every other day. 1st dose today  - RUQ US for transaminitis - given results of ruq us will discharge with zetia v statin  - rec hepatitis panel  - Continue Coreg 6.25 PO bid  - creatinine: 1.2 -- . 1.18 --> 1.3  - sodium -->138 -->141 -->134    discharge planning, can follow up with Dr.Kumar Arambula outpatient.

## 2023-07-12 NOTE — DISCHARGE NOTE NURSING/CASE MANAGEMENT/SOCIAL WORK - PATIENT PORTAL LINK FT
You can access the FollowMyHealth Patient Portal offered by [facility] by registering at the following website: [email or web address]. By joining [facility]’s FollowMyHealth portal, you will also be able to view your health information using other applications (apps) compatible with our system.

## 2023-07-12 NOTE — DISCHARGE NOTE PROVIDER - ATTENDING DISCHARGE PHYSICAL EXAMINATION:
PHYSICAL EXAM:  GENERAL: NAD, well-developed  EYES: conjunctiva and sclera clear  CHEST/LUNG: Clear to auscultation bilaterally; No wheeze  HEART: irregularly irregular  ABDOMEN: Soft, Nontender, Nondistended; Bowel sounds present  EXTREMITIES:  2+ Peripheral Pulses, No clubbing, cyanosis, or edema  PSYCH: AAOx3; calm and cooperative   NEUROLOGY: non-focal; moving all extremities; answering all questions appropriately     Patient seen and examined. Feels well today and ready to go home. All meds sent to [facility]. Schedule follow-up appointments with both cardiology and EP for early August. Stable for dc home today.

## 2023-07-12 NOTE — DISCHARGE NOTE NURSING/CASE MANAGEMENT/SOCIAL WORK - NSDCPEFALRISK_GEN_ALL_CORE
For information on Fall & Injury Prevention, visit: [email or web address] OR  [email or web address] OR  https://www.cdc.gov/steadi/patient.html

## 2023-07-31 ENCOUNTER — APPOINTMENT (OUTPATIENT)
Dept: FAMILY MEDICINE | Facility: CLINIC | Age: 48
End: 2023-07-31
Payer: MEDICAID

## 2023-07-31 DIAGNOSIS — Z87.2 PERSONAL HISTORY OF DISEASES OF THE SKIN AND SUBCUTANEOUS TISSUE: ICD-10-CM

## 2023-07-31 DIAGNOSIS — S99.929A UNSPECIFIED INJURY OF UNSPECIFIED FOOT, INITIAL ENCOUNTER: ICD-10-CM

## 2023-07-31 DIAGNOSIS — Z83.3 FAMILY HISTORY OF DIABETES MELLITUS: ICD-10-CM

## 2023-07-31 DIAGNOSIS — Z86.79 PERSONAL HISTORY OF OTHER DISEASES OF THE CIRCULATORY SYSTEM: ICD-10-CM

## 2023-07-31 DIAGNOSIS — Z87.09 PERSONAL HISTORY OF OTHER DISEASES OF THE RESPIRATORY SYSTEM: ICD-10-CM

## 2023-07-31 PROCEDURE — 99203 OFFICE O/P NEW LOW 30 MIN: CPT

## 2023-08-01 VITALS
HEART RATE: 85 BPM | WEIGHT: 221 LBS | BODY MASS INDEX: 41.72 KG/M2 | TEMPERATURE: 98 F | HEIGHT: 61 IN | OXYGEN SATURATION: 99 % | RESPIRATION RATE: 18 BRPM | SYSTOLIC BLOOD PRESSURE: 127 MMHG | DIASTOLIC BLOOD PRESSURE: 91 MMHG

## 2023-08-01 PROBLEM — Z87.2 HISTORY OF ECZEMA: Status: RESOLVED | Noted: 2023-08-01 | Resolved: 2023-08-01

## 2023-08-01 PROBLEM — Z87.09 HISTORY OF ASTHMA: Status: RESOLVED | Noted: 2023-08-01 | Resolved: 2023-08-01

## 2023-08-01 PROBLEM — Z86.79 HISTORY OF ATRIAL FIBRILLATION: Status: RESOLVED | Noted: 2023-08-01 | Resolved: 2023-08-01

## 2023-08-01 PROBLEM — Z86.79 HISTORY OF HYPERTENSION: Status: RESOLVED | Noted: 2023-08-01 | Resolved: 2023-08-01

## 2023-08-01 PROBLEM — Z83.3 FAMILY HISTORY OF DIABETES MELLITUS: Status: ACTIVE | Noted: 2023-08-01

## 2023-08-01 RX ORDER — METOPROLOL SUCCINATE 50 MG/1
50 TABLET, EXTENDED RELEASE ORAL
Qty: 60 | Refills: 0 | Status: DISCONTINUED | COMMUNITY
Start: 2023-06-26

## 2023-08-01 RX ORDER — LOSARTAN POTASSIUM 50 MG/1
50 TABLET, FILM COATED ORAL
Qty: 30 | Refills: 0 | Status: DISCONTINUED | COMMUNITY
Start: 2022-10-15

## 2023-08-01 RX ORDER — ATORVASTATIN CALCIUM 40 MG/1
40 TABLET, FILM COATED ORAL
Qty: 90 | Refills: 0 | Status: DISCONTINUED | COMMUNITY
Start: 2023-05-23

## 2023-08-01 RX ORDER — SPIRONOLACTONE 25 MG/1
25 TABLET ORAL
Qty: 30 | Refills: 0 | Status: DISCONTINUED | COMMUNITY
Start: 2022-10-15

## 2023-08-01 NOTE — HISTORY OF PRESENT ILLNESS
[de-identified] : 48yrs old female presenting \par Recent Hx: Hospital course 7/5-7/12 for dizziness. \par PMHx: Asthma, HTN, CHF, Afib on Eliquis & Metoprolol, Hx-Eczema, Hx-Cardiomyopathy\par FHx: DM2\par \par *Vaccination: Pneumo ?   [Post-hospitalization from ___ Hospital] : Post-hospitalization from [unfilled] Hospital [FreeTextEntry2] : 7/5/23 - 7/12/23  Dx: Acute Decompensated Heart Failure

## 2023-08-01 NOTE — HEALTH RISK ASSESSMENT
[Yes] : Yes [2 - 4 times a month (2 pts)] : 2-4 times a month (2 points) [3 or 4 (1 pt)] : 3 or 4  (1 point) [Never (0 pts)] : Never (0 points) [0] : 2) Feeling down, depressed, or hopeless: Not at all (0) [PHQ-2 Negative - No further assessment needed] : PHQ-2 Negative - No further assessment needed [[id number]] : 0 [Never] : Never

## 2023-08-01 NOTE — HEALTH RISK ASSESSMENT
[Yes] : Yes [2 - 4 times a month (2 pts)] : 2-4 times a month (2 points) [3 or 4 (1 pt)] : 3 or 4  (1 point) [Never (0 pts)] : Never (0 points) [0] : 2) Feeling down, depressed, or hopeless: Not at all (0) [PHQ-2 Negative - No further assessment needed] : PHQ-2 Negative - No further assessment needed [[id number]] : 0 [Never] : Never

## 2023-08-01 NOTE — HISTORY OF PRESENT ILLNESS
[de-identified] : 48yrs old female presenting \par Recent Hx: Hospital course 7/5-7/12 for dizziness. \par PMHx: Asthma, HTN, CHF, Afib on Eliquis & Metoprolol, Hx-Eczema, Hx-Cardiomyopathy\par FHx: DM2\par \par *Vaccination: Pneumo ?   [Post-hospitalization from ___ Hospital] : Post-hospitalization from [unfilled] Hospital [FreeTextEntry2] : 7/5/23 - 7/12/23  Dx: Acute Decompensated Heart Failure

## 2023-08-01 NOTE — HISTORY OF PRESENT ILLNESS
[de-identified] : 48yrs old female presenting \par Recent Hx: Hospital course 7/5-7/12 for dizziness. \par PMHx: Asthma, HTN, CHF, Afib on Eliquis & Metoprolol, Hx-Eczema, Hx-Cardiomyopathy\par FHx: DM2\par \par *Vaccination: Pneumo ?   [Post-hospitalization from ___ Hospital] : Post-hospitalization from [unfilled] Hospital [FreeTextEntry2] : 7/5/23 - 7/12/23  Dx: Acute Decompensated Heart Failure

## 2023-08-01 NOTE — HEALTH RISK ASSESSMENT
[Yes] : Yes [2 - 4 times a month (2 pts)] : 2-4 times a month (2 points) [3 or 4 (1 pt)] : 3 or 4  (1 point) [Never (0 pts)] : Never (0 points) [0] : 2) Feeling down, depressed, or hopeless: Not at all (0) [PHQ-2 Negative - No further assessment needed] : PHQ-2 Negative - No further assessment needed [[id number]] : 0 [Never] : Never

## 2023-08-01 NOTE — ASSESSMENT
[FreeTextEntry1] : Acute HF -Reviewed in detail medication list & treatment goal -Emphasized importance of healthy meals and intake with limitation  -Discussed s/s to monitor, precautions to take to ER   f/u in 2 weeks post f/u:  Cardiology, Dr. Arambula: 8/2 EP, Dr. Reed: 8/9

## 2023-08-02 ENCOUNTER — APPOINTMENT (OUTPATIENT)
Dept: CARDIOLOGY | Facility: CLINIC | Age: 48
End: 2023-08-02
Payer: MEDICAID

## 2023-08-02 VITALS
BODY MASS INDEX: 41.72 KG/M2 | TEMPERATURE: 98 F | DIASTOLIC BLOOD PRESSURE: 78 MMHG | HEART RATE: 54 BPM | HEIGHT: 61 IN | OXYGEN SATURATION: 97 % | SYSTOLIC BLOOD PRESSURE: 127 MMHG | WEIGHT: 221 LBS

## 2023-08-02 PROBLEM — S99.929A TOE INJURY: Status: ACTIVE | Noted: 2023-08-02

## 2023-08-02 PROCEDURE — 99215 OFFICE O/P EST HI 40 MIN: CPT

## 2023-08-02 PROCEDURE — 93000 ELECTROCARDIOGRAM COMPLETE: CPT

## 2023-08-02 PROCEDURE — 99205 OFFICE O/P NEW HI 60 MIN: CPT | Mod: 25

## 2023-08-02 NOTE — REASON FOR VISIT
[FreeTextEntry1] : 48F with PMHx of HTN, NICM, mild CAD, HFrEF (EF 27%), persistent atrial fibrillation Eliquis, recent hospitalization for RVR and ADHF 2/2 medication nonadherence. Found to have a DEVAN thrombus and resumed on AC and cardioversion was deferred.  Feels well.  Denies any chest pain dyspnea orthopnea PND or palpitations

## 2023-08-02 NOTE — PHYSICAL EXAM
[Well Developed] : well developed [Well Nourished] : well nourished [No Acute Distress] : no acute distress [Normal Conjunctiva] : normal conjunctiva [Normal Venous Pressure] : normal venous pressure [No Carotid Bruit] : no carotid bruit [Normal S1, S2] : normal S1, S2 [No Rub] : no rub [No Gallop] : no gallop [Clear Lung Fields] : clear lung fields [Good Air Entry] : good air entry [No Respiratory Distress] : no respiratory distress  [Soft] : abdomen soft [Non Tender] : non-tender [No Masses/organomegaly] : no masses/organomegaly [Normal Bowel Sounds] : normal bowel sounds [Normal Gait] : normal gait [No Edema] : no edema [No Cyanosis] : no cyanosis [No Clubbing] : no clubbing [No Varicosities] : no varicosities [No Rash] : no rash [Moves all extremities] : moves all extremities [No Skin Lesions] : no skin lesions [No Focal Deficits] : no focal deficits [Normal Speech] : normal speech [Alert and Oriented] : alert and oriented [Normal memory] : normal memory

## 2023-08-02 NOTE — ASSESSMENT
[FreeTextEntry1] : 1. AF w/ RVR with LAAclot - continue Eliquis  - rate controlled ~. Continue metoprolol and digoxin at current doses.  - Will follow up with Dr. Reed for timing of repeat TROY for possible cardioversion  2. Cardiomyopathy LVEF 27% - likely arrhythmia induced non-obx CAD recent [facility] - continue Entresto, Coreg, and Farxiga at current dose.  - will up titrate Entresto at next visit.   3. Moderate MR - likely functional, continue treatment of HFrEF as above.   4. CAD: mild non-obstructive.  - start on rosuvastatin 10 mg daily

## 2023-08-09 ENCOUNTER — NON-APPOINTMENT (OUTPATIENT)
Age: 48
End: 2023-08-09

## 2023-08-09 ENCOUNTER — APPOINTMENT (OUTPATIENT)
Dept: ELECTROPHYSIOLOGY | Facility: CLINIC | Age: 48
End: 2023-08-09
Payer: MEDICAID

## 2023-08-09 VITALS
DIASTOLIC BLOOD PRESSURE: 95 MMHG | SYSTOLIC BLOOD PRESSURE: 133 MMHG | WEIGHT: 221 LBS | HEART RATE: 123 BPM | HEIGHT: 61 IN | BODY MASS INDEX: 41.72 KG/M2 | OXYGEN SATURATION: 93 %

## 2023-08-09 DIAGNOSIS — Z82.49 FAMILY HISTORY OF ISCHEMIC HEART DISEASE AND OTHER DISEASES OF THE CIRCULATORY SYSTEM: ICD-10-CM

## 2023-08-09 DIAGNOSIS — Z87.891 PERSONAL HISTORY OF NICOTINE DEPENDENCE: ICD-10-CM

## 2023-08-09 DIAGNOSIS — Z78.9 OTHER SPECIFIED HEALTH STATUS: ICD-10-CM

## 2023-08-09 PROCEDURE — 99214 OFFICE O/P EST MOD 30 MIN: CPT | Mod: 25

## 2023-08-09 PROCEDURE — 93000 ELECTROCARDIOGRAM COMPLETE: CPT

## 2023-08-09 NOTE — HISTORY OF PRESENT ILLNESS
[FreeTextEntry1] : Referring Physician: Piyush Arambula MD  Dear Dr. Arambula,   Ms. Solomon was seen in the [facility] today. For our records, please allow me to summarize the history and my findings.   This pleasant 48-year-old woman has a cardiovascular history significant for HTN, nonobstructive CAD, Stage C/Class II non-ischemic HFrEF, and persistent atrial fibrillation. She originally presented to [facility] several years ago with acute decompensated HF. At the time was though to be nonischemic. She was on medical therapy but was lost to our follow up until May of 2023 when she presented again to [facility] in acute decompensated HF and new diagnosis of AF with RVR in the setting of missing medications after going to a bachelorette party in [place]. She was transferred to [facility] and underwent LHC that should minimal nonobstructive CAD. She was offered a TROY/DCCV but left AMA. She returned to the hospital on 7/5/23 for AF management. SHe was noted to be in acute decompensated HF, again due to missing some medications. She has not been taking Eliquis at the time. She was admitted to the CCU and diuresed. She underwent TROY but was found to have an DEVAN thrombus, and so a DCCV was aborted. She was discharged home on Eliquis and Dig.  She has been doing well since discharge. Overall, she is well rate controlled. No symptoms of shortness of breath or dyspnea on exertion. She has been adherent to her medical therapy and has not missed any Eliquis doses since discharge.   Ms. Solomon denies any recent history of chest pain, shortness of breath, palpitations, dizziness, or syncope.

## 2023-08-09 NOTE — CARDIOLOGY SUMMARY
[de-identified] : 7/8/23 TROY CONCLUSIONS: 1. Tethered mitral valve leaflets with normal opening. Moderate mitral regurgitation. 2. Normal trileaflet aortic valve. 3. Normal left atrium. Thrombus is seen within the pectinate muscles of the left atrial appendage. 4. Severe global left ventricular systolic dysfunction. 5. Normal right ventricular size with decreased right ventricular systolic function. [de-identified] : 5/8/23: The coronary circulation is right dominant.   Left main artery: Angiography shows mild atherosclerosis.   Left anterior descending artery: There is a 20 % stenosis.   Circumflex: Angiography shows mild atherosclerosis.   Right coronary artery: There is a 20 % stenosis.

## 2023-08-09 NOTE — DISCUSSION/SUMMARY
[EKG obtained to assist in diagnosis and management of assessed problem(s)] : EKG obtained to assist in diagnosis and management of assessed problem(s) [FreeTextEntry1] : In summary, this is a 48-year-old woman with HTN, nonobstructive CAD, Stage C/Class II non-ischemic HFrEF, and persistent atrial fibrillation. She has long standing HF with reduced EF, predating her AF. I suspect the AF has worsened her EF but is not the primary etiology of her HF. Regardless, she would benefit from rhythm control. She had an DEVAN thrombus on TROY last month. She has since been on Eliquis and has been taking it as prescribed. We will plan for a repeat TROY/DCCV now. She was instructed to hold Farxiga for 3 days prior.   Ms. Solomon appeared to understand the whole discussion and verbalized that all of her questions were answered to her satisfaction.  Thank you for allowing me to be involved in the care of this pleasant woman. Please feel free to contact me with any questions.

## 2023-08-16 ENCOUNTER — APPOINTMENT (OUTPATIENT)
Dept: FAMILY MEDICINE | Facility: CLINIC | Age: 48
End: 2023-08-16
Payer: MEDICAID

## 2023-08-16 VITALS
HEIGHT: 61 IN | SYSTOLIC BLOOD PRESSURE: 113 MMHG | HEART RATE: 50 BPM | OXYGEN SATURATION: 96 % | BODY MASS INDEX: 41.72 KG/M2 | RESPIRATION RATE: 18 BRPM | TEMPERATURE: 98 F | DIASTOLIC BLOOD PRESSURE: 89 MMHG | WEIGHT: 221 LBS

## 2023-08-16 PROCEDURE — 99212 OFFICE O/P EST SF 10 MIN: CPT

## 2023-08-16 NOTE — ASSESSMENT
[FreeTextEntry1] : -Compliant w/ medication -No renewal needed at this time -Denies S/E from change in medication   f/u 3 months//prn

## 2023-08-16 NOTE — HISTORY OF PRESENT ILLNESS
[de-identified] : 48yr old female present for f/u care post encounter w/ cardiologist.  PMHx HTN, CAD, HFrEF, Afib on Eliquis. BP controlled at home. Slight elevation in office d/t running to make appt.  Denies headache, chest pain, SOB, abdominal pain at this time.   Changes: -lasix every other day                 -rosuvastatin 10mg at bedtime  Future: EP scheduled fpr 9/8. reinforced hold farxiga 3 days prior

## 2023-08-29 ENCOUNTER — OUTPATIENT (OUTPATIENT)
Dept: OUTPATIENT SERVICES | Facility: HOSPITAL | Age: 48
LOS: 1 days | End: 2023-08-29

## 2023-08-29 VITALS
RESPIRATION RATE: 16 BRPM | HEIGHT: 67.5 IN | TEMPERATURE: 98 F | DIASTOLIC BLOOD PRESSURE: 88 MMHG | SYSTOLIC BLOOD PRESSURE: 132 MMHG | HEART RATE: 66 BPM | WEIGHT: 222.01 LBS | OXYGEN SATURATION: 98 %

## 2023-08-29 DIAGNOSIS — I48.91 UNSPECIFIED ATRIAL FIBRILLATION: ICD-10-CM

## 2023-08-29 DIAGNOSIS — I48.19 OTHER PERSISTENT ATRIAL FIBRILLATION: ICD-10-CM

## 2023-08-29 DIAGNOSIS — I50.9 HEART FAILURE, UNSPECIFIED: ICD-10-CM

## 2023-08-29 LAB
ANION GAP SERPL CALC-SCNC: 13 MMOL/L — SIGNIFICANT CHANGE UP (ref 7–14)
BUN SERPL-MCNC: 23 MG/DL — SIGNIFICANT CHANGE UP (ref 7–23)
CALCIUM SERPL-MCNC: 9.9 MG/DL — SIGNIFICANT CHANGE UP (ref 8.4–10.5)
CHLORIDE SERPL-SCNC: 101 MMOL/L — SIGNIFICANT CHANGE UP (ref 98–107)
CO2 SERPL-SCNC: 27 MMOL/L — SIGNIFICANT CHANGE UP (ref 22–31)
CREAT SERPL-MCNC: 1.16 MG/DL — SIGNIFICANT CHANGE UP (ref 0.5–1.3)
EGFR: 58 ML/MIN/1.73M2 — LOW
GLUCOSE SERPL-MCNC: 79 MG/DL — SIGNIFICANT CHANGE UP (ref 70–99)
HCG SERPL-ACNC: <1 MIU/ML — SIGNIFICANT CHANGE UP
HCT VFR BLD CALC: 44.7 % — SIGNIFICANT CHANGE UP (ref 34.5–45)
HGB BLD-MCNC: 14.6 G/DL — SIGNIFICANT CHANGE UP (ref 11.5–15.5)
MCHC RBC-ENTMCNC: 30.9 PG — SIGNIFICANT CHANGE UP (ref 27–34)
MCHC RBC-ENTMCNC: 32.7 GM/DL — SIGNIFICANT CHANGE UP (ref 32–36)
MCV RBC AUTO: 94.7 FL — SIGNIFICANT CHANGE UP (ref 80–100)
NRBC # BLD: 0 /100 WBCS — SIGNIFICANT CHANGE UP (ref 0–0)
NRBC # FLD: 0 K/UL — SIGNIFICANT CHANGE UP (ref 0–0)
PLATELET # BLD AUTO: 290 K/UL — SIGNIFICANT CHANGE UP (ref 150–400)
POTASSIUM SERPL-MCNC: 4.3 MMOL/L — SIGNIFICANT CHANGE UP (ref 3.5–5.3)
POTASSIUM SERPL-SCNC: 4.3 MMOL/L — SIGNIFICANT CHANGE UP (ref 3.5–5.3)
RBC # BLD: 4.72 M/UL — SIGNIFICANT CHANGE UP (ref 3.8–5.2)
RBC # FLD: 14 % — SIGNIFICANT CHANGE UP (ref 10.3–14.5)
SODIUM SERPL-SCNC: 141 MMOL/L — SIGNIFICANT CHANGE UP (ref 135–145)
WBC # BLD: 3.04 K/UL — LOW (ref 3.8–10.5)
WBC # FLD AUTO: 3.04 K/UL — LOW (ref 3.8–10.5)

## 2023-08-29 RX ORDER — SACUBITRIL AND VALSARTAN 24; 26 MG/1; MG/1
1 TABLET, FILM COATED ORAL
Refills: 0 | DISCHARGE

## 2023-08-29 NOTE — H&P PST ADULT - NSICDXPASTMEDICALHX_GEN_ALL_CORE_FT
PAST MEDICAL HISTORY:  Asthma     Atrial fibrillation     Chronic systolic congestive heart failure     H/O cardiomyopathy     H/O eczema     Hypertension     Noncompliance for HTN medication for years

## 2023-08-29 NOTE — H&P PST ADULT - PROBLEM SELECTOR PLAN 2
instructed patient to hold farxiga 3 days prior to surgery . instructed patient to hold Farxiga 3 days prior to surgery . last dose 9/4/23    copy EKG, ECHO, EP note in the chart

## 2023-08-29 NOTE — H&P PST ADULT - PROBLEM SELECTOR PLAN 1
Patient tentatively scheduled for    Pre-op instructions provided. Pt given verbal and written instructions with teach back on chlorhexidine shampoo and Pepcid. Pt verbalized understanding with return demonstration.    Patient to obtain all medication instructions as per EP Patient tentatively scheduled for TE/CV    Pre-op instructions provided. Pt given verbal and written instructions with teach back on chlorhexidine shampoo and Pepcid. Pt verbalized understanding with return demonstration.    Patient to obtain all medication instructions as per EP

## 2023-08-29 NOTE — H&P PST ADULT - HISTORY OF PRESENT ILLNESS
Patient is  Patient is 48 year old female with hx of HTN, CHF presented to PST with pre op dx of atrial fibrillation on ELIQUIS ,  recent DCCV was aborted due to LA thrombus , now patient scheduled foe TROY/CV

## 2023-08-29 NOTE — H&P PST ADULT - BSA (M2)
Patient:   FELY SOTELO            MRN: [id number]            FIN: [id number]              Age:   65 years     Sex:  FEMALE     :  53   Associated Diagnoses:   None   Author:   DANIEL VAIL       Neurology Consultation    HISTORY OF PRESENT ILLNESS:    66 yo F with a h/o HTN, HLD, and cerebral aneurysm repair s/p clipping at [facility] in 2012 per family.  Pt was admitted with symptoms of chest pain.  I was asked to eval to weigh in on safety of anticoagulation in order to receive full dose anticoagulation in the presence of her present NSTEMI.  Pt denies ever having a prior SAH before her aneurysm clips were placed.  She currently denies HA, nausea. vomiting, cp, sob, palpitations,  vertigo, dysarthria, dysphagia, focal weakness or numbness.    REVIEW OF SYSTEMS:  12 point ROS was obtained.  Pertinent items per HPI otherwise neg    PAST MEDICAL HISTORY:   Aneurysm  CVA (cerebral vascular accident)  Chronic GERD  High blood pressure  Smoker  Ulcer      PAST SURGICAL HISTORY:  No qualifying data available.    SOCIAL HISTORY:  Alcohol  Details: Alcohol Abuse in Household: No.  Use: None.  If current Alcohol user: More than 5 (M) or 4 (F) drinks within a couple of hours? No.  IF YES, have you consumed this quantity 5 times or more in the last 30 Days? No.  Exercise  Details: Exercise: Occasionally.  Duration (mins): 30.  Times Per Week: 3-4 times/week.  Type: Walking.  Sexual  Details: Sexual orientation: Straight or heterosexual.  Gender Identity: Identifies as female.  Gender on Ins: Female.  Preferred Pronouns: Female.  Substance Abuse  Details: Substance Abuse in Household: No.  Use: None.  Tobacco  Details: Smoker in [place]: Yes.  Smoked/Smokeless Tobacco Last 30 Days: Yes.  Smoking Tobacco Use: Current every day smoker.  Smokeless Tobacco Use Smokeless tobacco user within last 30 days.  Type: Cigarettes.  Cigarette Packs/Day: 5 or More Cigarettes <1 Pack Per Day.   Cigars/Pipes How Often: Not Daily.  Year(s): 52.  Started Age: 13.0 Years.  Cultural/Orthodoxy Practices  Details: Orthodoxy or Cultural Practices: Worship.  Orthodoxy or Cultural Practices While in Hospital: No.    PATIENT HOME MEDICATION LIST:  Home Medications (4) Active  aspirin oral 81 mg DR tablet (Ecotrin Low Strength) 81 mg = 1 tab, Oral, Daily  atorvastatin oral 10 mg tablet 10 mg = 1 tab, Oral, Daily  enalapril oral 20 mg tablet 20 mg = 1 tab, Oral, Daily  pantoprazole oral 40 mg DR tablet 40 mg = 1 tab, Oral, Daily    PHYSICAL EXAMINATION:   Vital Signs:      Vitals between:   17-FEB-2019 15:03:34   TO   18-FEB-2019 15:03:34                   LAST RESULT MINIMUM MAXIMUM  Temperature 37.5 36.7 37.8  Heart Rate 112 94 137  Respiratory Rate 19 16 28  NISBP           109 107 146  NIDBP           94 79 124  NIMBP           100 97 131  SpO2                    96 90 100      General:   NAD  HEENT: normal  Respiratory: CTA b/l  Cardiovascular:  rrr  Abdomen: soft nt  CNS:          Mental Status:  a/o x 4, no apashia        Cranial Nerves:  2-12 intact        Motor:  5/5 throughout, no drift        Sensory:  intact LT and temp, no extinction        Reflexes:  symmetric, toes mute        Coordination/gait: normal        NIH Stroke Scale:  0      LABORATORY RESULTS:      Labs between:  17-FEB-2019 15:03 to 18-FEB-2019 15:03    CBC:                 WBC  HgB  Hct  Plt  MCV  RDW   18-FEB-2019 5.8  12.9  38.3  352  (H) 101.1  13.0     BMP:                 Na  Cl  BUN  Glu   18-FEB-2019 139  102  (H) 37  (H) 114                              K  CO2  Cr  Ca                              4.1  30  (H) 1.70  9.3   BMP:                 Na  Cl  BUN  Glu   17-FEB-2019                                     K  CO2  Cr  Ca                              4.4           CMP:                 AST  ALT  AlkPhos  Bili  Albumin   17-FEB-2019 19  10  103  (H) 1.1  (L) 3.5                         Impression and Plan:  64 yo F with a h/o HTN, HLD,  and prior intracranial aneurysm repair who was admitted with NSTEMI.  I was asked to eval to weigh in on anticoagulation int he setting of aneurysm repair and clips.  Pt without a prior history of SAH per her report.  Risk is low in general in regards to aneurysm rupture however uncontrolled HTN adn aneurysm size may increase risk. For now will check MRA head.  Pt creartinine continuing to worsen so CTA cannot be  performed for now.  I will also attempt to obtain records from [facility] in order to see exactly what happenned during that hospital stay and exactly how large her aneurysm was at that time.   2.13

## 2023-08-29 NOTE — H&P PST ADULT - NSANTHOSAYNRD_GEN_A_CORE
No. CHRISTIN screening performed.  STOP BANG Legend: 0-2 = LOW Risk; 3-4 = INTERMEDIATE Risk; 5-8 = HIGH Risk

## 2023-08-29 NOTE — H&P PST ADULT - PRO ARRIVE FROM
Occupational Therapy   Treatment    Name: Hedy Conway  MRN: [id number]  Admitting Diagnosis:  Polyarthritis  4 Days Post-Op    Recommendations:     Discharge Recommendations: rehabilitation facility  Discharge Equipment Recommendations:   (TBD at IPR.)  Barriers to discharge:   (Pt significantly limited by pain and weaknessm requiring x 2 person assist for standing and is unable to ambualte at this time; pt will benefit from multidisciplinary approach in an IPR setting for returning to PLOF as she was (I) and working PTA.)    Assessment:     Hedy Conway is a 48 y.o. female with a medical diagnosis of Polyarthritis.  Performance deficits affecting function are weakness, impaired endurance, impaired functional mobility, impaired self care skills, impaired balance, decreased upper extremity function, decreased lower extremity function, decreased coordination, decreased safety awareness, decreased ROM, pain, edema.     Pt pleasant and willing to participate in tx session this date.  Pt noted w/ good progress w/ sit>stand trials (x4), requiring mod A x 2 as compared to previous session. Pt w/ pain but tolerated fairly with rest breaks as needed. Pt is making gradual progress towards goals and will continue to benefit from skilled acute OT services to maximize functional capacity for safe performance w/ ADLs and functional mobility.      PTA, pt was (I) w/ all ADLs and functional mobility; pt will benefit from an aggressive multidisciplinary approach in an IPR setting for returning to PLOF. Despite pain and functional limitations, pt is motivated and willing to participate.     Rehab Prognosis:  Good; patient would benefit from acute skilled OT services to address these deficits and reach maximum level of function.       Plan:     Patient to be seen 5 x/week to address the above listed problems via self-care/home management, therapeutic activities, therapeutic exercises  Plan of Care Expires: 04/07/23  Plan of  Care Reviewed with: patient    Subjective     Chief Complaint: Pain in B knees w/ standing   Patient/Family Comments/goals: None stated   Pain/Comfort:  Pain Rating 1: 10/10  Location - Side 1: Bilateral  Location 1: knee  Pain Addressed 1: Pre-medicate for activity, Reposition, Distraction, Cessation of Activity    Objective:     Communicated with: Nurse prior to session.  Patient found HOB elevated with bed alarm, PICC line, PureWick upon OT entry to room.    General Precautions: Standard, fall    Orthopedic Precautions:RLE weight bearing as tolerated, LLE weight bearing as tolerated (per ortho)  Braces: N/A  Respiratory Status: Room air     Occupational Performance:     Bed Mobility:    Patient completed Rolling/Turning to Left with  moderate assistance  Patient completed Scooting/Bridging with minimum assistance and moderate assistance  Patient completed Supine to Sit with moderate assistance     Functional Mobility/Transfers:  Patient completed Sit <> Stand Transfer x 3 trials from EOB with maximal assistance and of 2 persons  with  rolling walker   Functional Mobility: Unable to take steps at this time.     Activities of Daily Living:  Grooming: shampoo cap was placed on pt's head in which she was able ot reach overhead and use fingers for lathering; pt w/ pain in RUE, tolerating ~1 min of activity; OT provided total A for remainder of washing hair; pt was able to use LUE for combing; grooming tasks were completed while sitting EOB, unsupported    Upper Body Dressing: minimum assistance for donning gown over back   Lower Body Dressing: dependence for donning socks for BLEs       AMPA 6 Click ADL: 15    Treatment & Education:  -Pt performed ADLs and functional mobility as noted above.   -OT performed retrograde massage to pt's RUE for ~8 min promote circulation and reduce edema; pt educated on importance of self performance as well.   -Pt performed RUE composite digit flexion for 1 set x 15 reps with 3 sec  holds; OT performed passive ROM to thumb for 1 set x 15 reps; pt able to tolerate w/ less complaints of pain.   -Questions and concerns addressed within scope.     Patient left sitting edge of bed with all lines intact and call button in reach    GOALS:   Multidisciplinary Problems       Occupational Therapy Goals          Problem: Occupational Therapy    Goal Priority Disciplines Outcome Interventions   Occupational Therapy Goal     OT, PT/OT Ongoing, Progressing    Description: Goals to be met by 04/07/23:    Patient will increase functional independence with ADLs by performing:    Grooming while seated with Modified [place].  Toileting from bedside commode with Moderate Assistance for hygiene and clothing management.   Sit to stand w/ Minimum Assistance.   Step transfer with Moderate Assistance  Toilet transfer to bedside commode with Moderate Assistance.  Upper extremity exercise program x15 reps per handout, with assistance as needed.                           Time Tracking:     OT Date of Treatment: 04/03/23  OT Start Time: 1105  OT Stop Time: 1139  OT Total Time (min): 34 min    Billable Minutes:Therapeutic Activity 20  Therapeutic Exercise 14  Total Time 34 (co-tx w/ PT)     OT/CHANDLER: OT     Number of CHANDLER visits since last OT visit: 1    4/3/2023   home

## 2023-08-29 NOTE — H&P PST ADULT - NSALCOHOLTYPE_GEN__A_CORE_SD
[FreeTextEntry1] : 58 yo female with left ureteral stone and hydronephrosis\par the patient underwent a PCNL at [facility] with subsequent bleeding and transfusion\par presents now for definitive management of her LEFT sided hydronephrosis due to a 4mm calculus ([facility] 10/28/2020)\par \par she underwent left ureteroscopy, laser lithotripsy and stent on 1/13/2021\par she was found to have a dense ureteral stricture\par she had a stent left in place\par the stent was removed and diagnostic ureteroscopy and retrograde ureteropyelogram performed\par \par she was referred back to Dr. Cleary\par presents now with suspected stricture recurrence vs newly impacted stone\par the patient has moderate to sever left sided hydronephrosis with thinning of the renal cortex\par \par CT scan [facility] 1/18/2022\par SEVERE left hydro nephrosis due to a 5 mm calculus in the mid ureter\par \par the patient remains asymptomatic \par denies fever\par denies chills\par denies nausea or vomitting\par 
wine

## 2023-09-06 ENCOUNTER — APPOINTMENT (OUTPATIENT)
Dept: CARDIOLOGY | Facility: CLINIC | Age: 48
End: 2023-09-06

## 2023-09-08 ENCOUNTER — OUTPATIENT (OUTPATIENT)
Dept: OUTPATIENT SERVICES | Facility: HOSPITAL | Age: 48
LOS: 1 days | Discharge: HOME | End: 2023-09-08
Payer: MEDICAID

## 2023-09-08 DIAGNOSIS — I48.19 OTHER PERSISTENT ATRIAL FIBRILLATION: ICD-10-CM

## 2023-09-08 LAB — HCG UR QL: NEGATIVE — SIGNIFICANT CHANGE UP

## 2023-09-08 PROCEDURE — 92960 CARDIOVERSION ELECTRIC EXT: CPT

## 2023-09-08 PROCEDURE — 93010 ELECTROCARDIOGRAM REPORT: CPT

## 2023-09-08 PROCEDURE — 93312 ECHO TRANSESOPHAGEAL: CPT | Mod: 26

## 2023-09-08 PROCEDURE — 93320 DOPPLER ECHO COMPLETE: CPT | Mod: 26,GC

## 2023-09-08 PROCEDURE — 93325 DOPPLER ECHO COLOR FLOW MAPG: CPT | Mod: 26,GC

## 2023-09-08 RX ORDER — DIGOXIN 250 MCG
1 TABLET ORAL
Refills: 0 | DISCHARGE

## 2023-09-08 RX ORDER — FUROSEMIDE 40 MG
1 TABLET ORAL
Refills: 0 | DISCHARGE

## 2023-09-08 RX ORDER — SACUBITRIL AND VALSARTAN 24; 26 MG/1; MG/1
1 TABLET, FILM COATED ORAL
Refills: 0 | DISCHARGE

## 2023-09-08 RX ORDER — APIXABAN 2.5 MG/1
1 TABLET, FILM COATED ORAL
Refills: 0 | DISCHARGE

## 2023-09-08 RX ORDER — DAPAGLIFLOZIN 10 MG/1
1 TABLET, FILM COATED ORAL
Refills: 0 | DISCHARGE

## 2023-09-08 RX ORDER — CARVEDILOL PHOSPHATE 80 MG/1
1 CAPSULE, EXTENDED RELEASE ORAL
Refills: 0 | DISCHARGE

## 2023-09-08 NOTE — CHART NOTE - NSCHARTNOTEFT_GEN_A_CORE
Type of Procedure: TROY (Transesophageal echocardiogram)  Licensed independent practitioner: Lb Schulz MD  Assistant: None  Estimated blood loss: None  Specimen removed: None  Preoperative Dx: DEVAN clot  Postoperative Dx: Afib  Complications: None  Anesthesia type: Sedation by the attending anesthesiologist    Findings:   TROY limited to DEVAN evaluation prior to DCCV due to high risk patient  no LA/DEVAN clot    DCCV 200 J biphasic x 1 with restoration of NSR immediately post TROY       Full report to follow    Lb Shculz MD  Chief, Cardiology  [facility]

## 2023-09-08 NOTE — CHART NOTE - NSCHARTNOTEFT_GEN_A_CORE
47 y/o F w/ PMH HTN, HFrEF and Afib on Eliquis presents for elective TROY/DCCV. TROY/DCCV was attempted on 7/7/23 but DEVAN thrombus was seen so DCCV was aborted. Pt has remained on Eliquis so reattempt will be made to see if the thrombus has dissolved.    PST H&P and labs reviewed  Pt has no complaints at this time

## 2023-10-02 NOTE — ED ADULT TRIAGE NOTE - CHIEF COMPLAINT QUOTE
Addended by: MERCY LENZ on: 10/2/2023 02:09 PM     Modules accepted: Orders     patient verbalized  that I am not feeling well and out of it, weakness and short of breath, cam back from travel on 4/17

## 2023-10-10 NOTE — CHART NOTE - NSCHARTNOTEFT_GEN_A_CORE
48y female with PHMx: of HTN, HF (EF 41%), Afib on Eliquis and metoprolol who presents to the ED for dizziness who was found to be in AFIB with RVR and ADHF 2/2 med noncompliance. Admitted to CCU for diureses and rate control. TROY/DCCV cancelled dt LA appendage thrombus.      Cardiac:   #A fib with RVR - - In setting of med noncompliance   - ECG  with afib  with RVR without ischemic changes. trops neg.  - CHADS-VAS 3  PLan  - Continue Apixaban   - digoxin level . Start digoxin 125mcg PO every other day  - Continue Coreg to 6.25 mg BID  - EP following    #Acute on chronic systolic heart failure   - Likely tachy induced   - Euvolemic on exam    - NICM on [facility] on 5/5/2023  - TTE: Severe segmental left ventricular systolic dysfunction.The septal, apical,  inferior, anterior, and anteroseptal are akinetic. The remaining walls are hypokinetic. LVEF calculated using biplane Arreola's method is 27%.  Sev MR  - Cath 5/23 non obstructive  Plan  - Continue Entresto    -  restart home lasix 20 mg daily when creatinine normalizes  - Continue Coreg 6.25 PO bid  - Lact 3.7-->1.4-> 2.2 48y female with PHMx: of HTN, HF (EF 41%), Afib on Eliquis and metoprolol who presents to the ED for dizziness who was found to be in AFIB with RVR and ADHF 2/2 med noncompliance. Admitted to CCU for diureses and rate control. TROY/DCCV cancelled dt LA appendage thrombus.      Cardiac:   #A fib with RVR - - In setting of med noncompliance   - ECG  with afib  with RVR without ischemic changes. trops neg.  - CHADS-VAS 3  PLan  - Continue Apixaban   - digoxin level 1.7. Start digoxin 125mcg PO every other day  - Continue Coreg to 6.25 mg BID  - EP following    #Acute on chronic systolic heart failure   - Likely tachy induced   - Euvolemic on exam    - NICM on [facility] on 5/5/2023  - TTE: Severe segmental left ventricular systolic dysfunction.The septal, apical,  inferior, anterior, and anteroseptal are akinetic. The remaining walls are hypokinetic. LVEF calculated using biplane Arreola's method is 27%.  Sev MR    Plan  - Continue Entresto    -  restart home lasix 20 mg daily when creatinine normalizes  - Continue Coreg 6.25 PO bid  - Lact 3.7-->1.4-> 2.2 Calcipotriene Counseling:  I discussed with the patient the risks of calcipotriene including but not limited to erythema, scaling, itching, and irritation.

## 2023-10-11 ENCOUNTER — NON-APPOINTMENT (OUTPATIENT)
Age: 48
End: 2023-10-11

## 2023-10-11 ENCOUNTER — APPOINTMENT (OUTPATIENT)
Dept: ELECTROPHYSIOLOGY | Facility: CLINIC | Age: 48
End: 2023-10-11
Payer: MEDICAID

## 2023-10-11 VITALS
HEART RATE: 100 BPM | SYSTOLIC BLOOD PRESSURE: 165 MMHG | OXYGEN SATURATION: 97 % | HEIGHT: 67 IN | DIASTOLIC BLOOD PRESSURE: 114 MMHG

## 2023-10-11 VITALS — SYSTOLIC BLOOD PRESSURE: 140 MMHG | DIASTOLIC BLOOD PRESSURE: 87 MMHG

## 2023-10-11 DIAGNOSIS — I48.19 OTHER PERSISTENT ATRIAL FIBRILLATION: ICD-10-CM

## 2023-10-11 DIAGNOSIS — Z91.89 OTHER SPECIFIED PERSONAL RISK FACTORS, NOT ELSEWHERE CLASSIFIED: ICD-10-CM

## 2023-10-11 DIAGNOSIS — I50.9 HEART FAILURE, UNSPECIFIED: ICD-10-CM

## 2023-10-11 PROBLEM — I48.91 UNSPECIFIED ATRIAL FIBRILLATION: Chronic | Status: ACTIVE | Noted: 2023-08-29

## 2023-10-11 PROCEDURE — 99215 OFFICE O/P EST HI 40 MIN: CPT | Mod: 25

## 2023-10-11 PROCEDURE — 93000 ELECTROCARDIOGRAM COMPLETE: CPT

## 2023-10-23 NOTE — ED PROVIDER NOTE - CADM POA CENTRAL LINE
He is having consistent pain in both arms, upper back and shoulder for several months(since May 8 this yr) with chest pain / soreness and mild shortness of breath that worsens with activity / movement x months.  Also having difficulty to fully expand lungs to take deep breaths x several months.  L side chest soreness was discussed with pcp before.  Back, shoulder, arm pain were not discussed   sxs same, not worsening.  No sob, chest pain, dizziness at this time.  Per pt, per cardiologist, most likely it's not the heart.    Pt asking for appt with Dr. Burgess.  Per pt, message is for Dr. Burgess and not for Dr. Baxter.   No

## 2023-11-15 ENCOUNTER — APPOINTMENT (OUTPATIENT)
Dept: ELECTROPHYSIOLOGY | Facility: CLINIC | Age: 48
End: 2023-11-15

## 2023-12-27 NOTE — ED PROVIDER NOTE - NS ED ATTENDING STATEMENT MOD
Patient has had pain with reaching bottom despite holding chamber and use of afrin.  Will hold HBOT tomorrow and try Friday.  He may need PE tubes.  Bilateral TEED 3.     Colton Chanel, NP     Attending Only

## 2024-01-26 RX ORDER — ROSUVASTATIN CALCIUM 10 MG/1
10 TABLET, FILM COATED ORAL
Qty: 1 | Refills: 1 | Status: ACTIVE | COMMUNITY
Start: 2023-08-02 | End: 1900-01-01

## 2024-01-26 RX ORDER — SACUBITRIL AND VALSARTAN 49; 51 MG/1; MG/1
49-51 TABLET, FILM COATED ORAL
Qty: 180 | Refills: 2 | Status: ACTIVE | COMMUNITY
Start: 2023-05-23 | End: 1900-01-01

## 2024-01-26 RX ORDER — APIXABAN 5 MG/1
5 TABLET, FILM COATED ORAL
Qty: 180 | Refills: 3 | Status: ACTIVE | COMMUNITY
Start: 2023-05-23 | End: 1900-01-01

## 2024-01-31 ENCOUNTER — APPOINTMENT (OUTPATIENT)
Dept: FAMILY MEDICINE | Facility: CLINIC | Age: 49
End: 2024-01-31
Payer: MEDICAID

## 2024-01-31 DIAGNOSIS — I10 ESSENTIAL (PRIMARY) HYPERTENSION: ICD-10-CM

## 2024-01-31 PROCEDURE — 99442: CPT

## 2024-01-31 RX ORDER — DIGOXIN 125 UG/1
125 TABLET ORAL
Qty: 90 | Refills: 3 | Status: ACTIVE | COMMUNITY
Start: 1900-01-01 | End: 1900-01-01

## 2024-01-31 RX ORDER — DAPAGLIFLOZIN 10 MG/1
10 TABLET, FILM COATED ORAL
Qty: 90 | Refills: 1 | Status: ACTIVE | COMMUNITY
Start: 1900-01-01 | End: 1900-01-01

## 2024-01-31 RX ORDER — FUROSEMIDE 20 MG/1
20 TABLET ORAL
Qty: 90 | Refills: 1 | Status: ACTIVE | COMMUNITY
Start: 2023-05-23 | End: 1900-01-01

## 2024-01-31 NOTE — HISTORY OF PRESENT ILLNESS
[Home] : at home, [unfilled] , at the time of the visit. [Medical Office: ([facility])___] : at the medical office located in  [Verbal consent obtained from patient] : the patient, [unfilled] [FreeTextEntry1] : update on health [de-identified] : 48yr old female present for f/u care. PMHx HTN, CAD, HFrEF, Afib on Eliquis. BP controlled at home. Reports compliance with medication, "feeling better since starting medications". BP checks once a week with range in 130-140/90s. 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  Denies headache, chest pain, SOB, abdominal pain at this time.   Vac: deferred flu this yr.

## 2024-05-06 ENCOUNTER — RX RENEWAL (OUTPATIENT)
Age: 49
End: 2024-05-06

## 2024-05-20 ENCOUNTER — RX CHANGE (OUTPATIENT)
Age: 49
End: 2024-05-20

## 2024-05-20 RX ORDER — CARVEDILOL 6.25 MG/1
6.25 TABLET, FILM COATED ORAL
Qty: 180 | Refills: 1 | Status: ACTIVE | COMMUNITY
Start: 1900-01-01 | End: 1900-01-01

## 2024-05-20 RX ORDER — CARVEDILOL 6.25 MG/1
6.25 TABLET, FILM COATED ORAL TWICE DAILY
Qty: 30 | Refills: 0 | Status: DISCONTINUED | COMMUNITY
Start: 2022-10-15 | End: 2024-05-20

## 2024-11-13 ENCOUNTER — NON-APPOINTMENT (OUTPATIENT)
Age: 49
End: 2024-11-13

## 2024-11-13 ENCOUNTER — APPOINTMENT (OUTPATIENT)
Dept: ELECTROPHYSIOLOGY | Facility: CLINIC | Age: 49
End: 2024-11-13
Payer: MEDICAID

## 2024-11-13 VITALS
OXYGEN SATURATION: 98 % | HEART RATE: 143 BPM | HEIGHT: 67 IN | SYSTOLIC BLOOD PRESSURE: 148 MMHG | WEIGHT: 221 LBS | DIASTOLIC BLOOD PRESSURE: 94 MMHG | BODY MASS INDEX: 34.69 KG/M2

## 2024-11-13 DIAGNOSIS — I10 ESSENTIAL (PRIMARY) HYPERTENSION: ICD-10-CM

## 2024-11-13 DIAGNOSIS — I50.9 HEART FAILURE, UNSPECIFIED: ICD-10-CM

## 2024-11-13 DIAGNOSIS — I48.19 OTHER PERSISTENT ATRIAL FIBRILLATION: ICD-10-CM

## 2024-11-13 PROCEDURE — 99215 OFFICE O/P EST HI 40 MIN: CPT

## 2024-11-13 PROCEDURE — G2211 COMPLEX E/M VISIT ADD ON: CPT | Mod: NC

## 2024-11-13 PROCEDURE — 93000 ELECTROCARDIOGRAM COMPLETE: CPT

## 2024-11-25 ENCOUNTER — APPOINTMENT (OUTPATIENT)
Dept: CARDIOLOGY | Facility: CLINIC | Age: 49
End: 2024-11-25

## 2024-11-25 ENCOUNTER — NON-APPOINTMENT (OUTPATIENT)
Age: 49
End: 2024-11-25

## 2024-11-29 ENCOUNTER — APPOINTMENT (OUTPATIENT)
Dept: PULMONOLOGY | Facility: CLINIC | Age: 49
End: 2024-11-29

## 2024-12-09 ENCOUNTER — RX RENEWAL (OUTPATIENT)
Age: 49
End: 2024-12-09

## 2024-12-09 ENCOUNTER — APPOINTMENT (OUTPATIENT)
Dept: HEART FAILURE | Facility: CLINIC | Age: 49
End: 2024-12-09

## 2024-12-10 ENCOUNTER — APPOINTMENT (OUTPATIENT)
Dept: CARDIOLOGY | Facility: CLINIC | Age: 49
End: 2024-12-10

## 2024-12-10 DIAGNOSIS — I50.9 HEART FAILURE, UNSPECIFIED: ICD-10-CM

## 2024-12-10 DIAGNOSIS — I10 ESSENTIAL (PRIMARY) HYPERTENSION: ICD-10-CM

## 2024-12-10 DIAGNOSIS — I48.19 OTHER PERSISTENT ATRIAL FIBRILLATION: ICD-10-CM

## 2024-12-23 ENCOUNTER — APPOINTMENT (OUTPATIENT)
Dept: ORTHOPEDIC SURGERY | Facility: CLINIC | Age: 49
End: 2024-12-23

## 2024-12-24 DIAGNOSIS — G89.29 PAIN IN RIGHT KNEE: ICD-10-CM

## 2024-12-24 DIAGNOSIS — M25.561 PAIN IN RIGHT KNEE: ICD-10-CM

## 2024-12-27 ENCOUNTER — APPOINTMENT (OUTPATIENT)
Facility: CLINIC | Age: 49
End: 2024-12-27
Payer: MEDICAID

## 2024-12-27 VITALS
WEIGHT: 250 LBS | TEMPERATURE: 97.2 F | BODY MASS INDEX: 39.24 KG/M2 | OXYGEN SATURATION: 97 % | RESPIRATION RATE: 18 BRPM | HEIGHT: 67 IN | SYSTOLIC BLOOD PRESSURE: 138 MMHG | HEART RATE: 121 BPM | DIASTOLIC BLOOD PRESSURE: 90 MMHG

## 2024-12-27 DIAGNOSIS — S80.01XA CONTUSION OF RIGHT KNEE, INITIAL ENCOUNTER: ICD-10-CM

## 2024-12-27 DIAGNOSIS — M70.41 PREPATELLAR BURSITIS, RIGHT KNEE: ICD-10-CM

## 2024-12-27 PROCEDURE — 99204 OFFICE O/P NEW MOD 45 MIN: CPT

## 2024-12-27 RX ORDER — METHYLPREDNISOLONE 4 MG/1
4 TABLET ORAL
Qty: 1 | Refills: 0 | Status: ACTIVE | COMMUNITY
Start: 2024-12-27 | End: 1900-01-01

## 2024-12-31 ENCOUNTER — APPOINTMENT (OUTPATIENT)
Dept: RADIOLOGY | Facility: CLINIC | Age: 49
End: 2024-12-31
Payer: MEDICAID

## 2024-12-31 PROCEDURE — 73610 X-RAY EXAM OF ANKLE: CPT | Mod: LT

## 2024-12-31 PROCEDURE — 73564 X-RAY EXAM KNEE 4 OR MORE: CPT | Mod: RT

## 2025-01-03 ENCOUNTER — APPOINTMENT (OUTPATIENT)
Dept: HEART FAILURE | Facility: CLINIC | Age: 50
End: 2025-01-03

## 2025-01-15 ENCOUNTER — APPOINTMENT (OUTPATIENT)
Age: 50
End: 2025-01-15

## 2025-01-22 ENCOUNTER — APPOINTMENT (OUTPATIENT)
Dept: PULMONOLOGY | Facility: CLINIC | Age: 50
End: 2025-01-22
Payer: MEDICAID

## 2025-01-22 VITALS
DIASTOLIC BLOOD PRESSURE: 89 MMHG | HEIGHT: 67 IN | OXYGEN SATURATION: 93 % | HEART RATE: 160 BPM | BODY MASS INDEX: 40.02 KG/M2 | TEMPERATURE: 97.1 F | SYSTOLIC BLOOD PRESSURE: 114 MMHG | WEIGHT: 255 LBS

## 2025-01-22 DIAGNOSIS — E66.812 OBESITY, CLASS 2: ICD-10-CM

## 2025-01-22 DIAGNOSIS — I48.91 UNSPECIFIED ATRIAL FIBRILLATION: ICD-10-CM

## 2025-01-22 DIAGNOSIS — G47.31 PRIMARY CENTRAL SLEEP APNEA: ICD-10-CM

## 2025-01-22 DIAGNOSIS — G47.33 OBSTRUCTIVE SLEEP APNEA (ADULT) (PEDIATRIC): ICD-10-CM

## 2025-01-22 DIAGNOSIS — G47.37 UNSPECIFIED ATRIAL FIBRILLATION: ICD-10-CM

## 2025-01-22 PROCEDURE — 99204 OFFICE O/P NEW MOD 45 MIN: CPT | Mod: GC

## 2025-01-22 PROCEDURE — G2211 COMPLEX E/M VISIT ADD ON: CPT | Mod: NC

## 2025-01-28 ENCOUNTER — APPOINTMENT (OUTPATIENT)
Age: 50
End: 2025-01-28
Payer: MEDICAID

## 2025-01-28 VITALS
SYSTOLIC BLOOD PRESSURE: 105 MMHG | OXYGEN SATURATION: 95 % | WEIGHT: 255 LBS | HEART RATE: 131 BPM | HEIGHT: 67 IN | DIASTOLIC BLOOD PRESSURE: 78 MMHG | BODY MASS INDEX: 40.02 KG/M2

## 2025-01-28 DIAGNOSIS — M23.91 UNSPECIFIED INTERNAL DERANGEMENT OF RIGHT KNEE: ICD-10-CM

## 2025-01-28 PROCEDURE — 99213 OFFICE O/P EST LOW 20 MIN: CPT

## 2025-02-07 ENCOUNTER — APPOINTMENT (OUTPATIENT)
Facility: CLINIC | Age: 50
End: 2025-02-07

## 2025-02-12 ENCOUNTER — APPOINTMENT (OUTPATIENT)
Dept: ELECTROPHYSIOLOGY | Facility: CLINIC | Age: 50
End: 2025-02-12

## 2025-02-18 ENCOUNTER — APPOINTMENT (OUTPATIENT)
Facility: HOSPITAL | Age: 50
End: 2025-02-18

## 2025-02-18 ENCOUNTER — OUTPATIENT (OUTPATIENT)
Dept: OUTPATIENT SERVICES | Facility: HOSPITAL | Age: 50
LOS: 1 days | Discharge: HOME | End: 2025-02-18

## 2025-02-18 DIAGNOSIS — G47.30 SLEEP APNEA, UNSPECIFIED: ICD-10-CM

## 2025-02-18 PROCEDURE — 95810 POLYSOM 6/> YRS 4/> PARAM: CPT | Mod: 26

## 2025-02-28 ENCOUNTER — APPOINTMENT (OUTPATIENT)
Dept: FAMILY MEDICINE | Facility: CLINIC | Age: 50
End: 2025-02-28

## 2025-03-04 ENCOUNTER — NON-APPOINTMENT (OUTPATIENT)
Age: 50
End: 2025-03-04

## 2025-03-11 ENCOUNTER — APPOINTMENT (OUTPATIENT)
Age: 50
End: 2025-03-11
Payer: MEDICAID

## 2025-03-11 VITALS
WEIGHT: 255 LBS | HEIGHT: 67 IN | BODY MASS INDEX: 40.02 KG/M2 | OXYGEN SATURATION: 96 % | HEART RATE: 172 BPM | DIASTOLIC BLOOD PRESSURE: 79 MMHG | SYSTOLIC BLOOD PRESSURE: 95 MMHG

## 2025-03-11 DIAGNOSIS — M79.89 OTHER SPECIFIED SOFT TISSUE DISORDERS: ICD-10-CM

## 2025-03-11 PROCEDURE — 99214 OFFICE O/P EST MOD 30 MIN: CPT

## 2025-03-12 ENCOUNTER — TRANSCRIPTION ENCOUNTER (OUTPATIENT)
Age: 50
End: 2025-03-12

## 2025-03-25 ENCOUNTER — APPOINTMENT (OUTPATIENT)
Dept: FAMILY MEDICINE | Facility: CLINIC | Age: 50
End: 2025-03-25

## 2025-04-03 ENCOUNTER — APPOINTMENT (OUTPATIENT)
Dept: PULMONOLOGY | Facility: CLINIC | Age: 50
End: 2025-04-03

## 2025-05-07 NOTE — ED ADULT TRIAGE NOTE - BMI (KG/M2)
41 The patient has been re-examined and I agree with the above assessment or I updated with my findings.

## 2025-05-19 ENCOUNTER — APPOINTMENT (OUTPATIENT)
Dept: HEART FAILURE | Facility: CLINIC | Age: 50
End: 2025-05-19